# Patient Record
Sex: FEMALE | Race: WHITE | NOT HISPANIC OR LATINO | Employment: FULL TIME | URBAN - METROPOLITAN AREA
[De-identification: names, ages, dates, MRNs, and addresses within clinical notes are randomized per-mention and may not be internally consistent; named-entity substitution may affect disease eponyms.]

---

## 2017-02-17 ENCOUNTER — TRANSCRIBE ORDERS (OUTPATIENT)
Dept: ADMINISTRATIVE | Facility: HOSPITAL | Age: 42
End: 2017-02-17

## 2017-02-17 ENCOUNTER — APPOINTMENT (OUTPATIENT)
Dept: LAB | Facility: HOSPITAL | Age: 42
End: 2017-02-17
Attending: FAMILY MEDICINE
Payer: COMMERCIAL

## 2017-02-17 DIAGNOSIS — E11.49 TYPE II DIABETES MELLITUS WITH NEUROLOGICAL MANIFESTATIONS (HCC): ICD-10-CM

## 2017-02-17 DIAGNOSIS — Z78.9 NORMAL URINALYSIS: Primary | ICD-10-CM

## 2017-02-17 LAB
LCA URINE COLLECTION: NORMAL
VENIPUNCTURE: NORMAL

## 2017-02-17 PROCEDURE — 36415 COLL VENOUS BLD VENIPUNCTURE: CPT | Performed by: FAMILY MEDICINE

## 2017-02-20 ENCOUNTER — LAB CONVERSION - ENCOUNTER (OUTPATIENT)
Dept: OTHER | Facility: OTHER | Age: 42
End: 2017-02-20

## 2017-02-20 ENCOUNTER — GENERIC CONVERSION - ENCOUNTER (OUTPATIENT)
Dept: OTHER | Facility: OTHER | Age: 42
End: 2017-02-20

## 2017-02-20 LAB
CREATININE, RANDOM URINE (HISTORICAL): 224 MG/DL (ref 20–320)
HBA1C MFR BLD HPLC: 7.6 % OF TOTAL HGB
MAGNESIUM, UR (HISTORICAL): 1 MG/DL
MICROALBUMIN/CREATININE RATIO (HISTORICAL): 4 MCG/MG CREAT

## 2017-02-24 ENCOUNTER — ALLSCRIPTS OFFICE VISIT (OUTPATIENT)
Dept: OTHER | Facility: OTHER | Age: 42
End: 2017-02-24

## 2017-03-03 ENCOUNTER — ALLSCRIPTS OFFICE VISIT (OUTPATIENT)
Dept: OTHER | Facility: OTHER | Age: 42
End: 2017-03-03

## 2017-03-28 ENCOUNTER — ALLSCRIPTS OFFICE VISIT (OUTPATIENT)
Dept: OTHER | Facility: OTHER | Age: 42
End: 2017-03-28

## 2017-06-05 ENCOUNTER — ALLSCRIPTS OFFICE VISIT (OUTPATIENT)
Dept: OTHER | Facility: OTHER | Age: 42
End: 2017-06-05

## 2017-06-05 DIAGNOSIS — R53.83 OTHER FATIGUE: ICD-10-CM

## 2017-06-05 DIAGNOSIS — F41.9 ANXIETY DISORDER: ICD-10-CM

## 2017-06-05 DIAGNOSIS — R07.9 CHEST PAIN: ICD-10-CM

## 2017-06-05 DIAGNOSIS — E11.42 TYPE 2 DIABETES MELLITUS WITH DIABETIC POLYNEUROPATHY (HCC): ICD-10-CM

## 2017-06-07 ENCOUNTER — TRANSCRIBE ORDERS (OUTPATIENT)
Dept: ADMINISTRATIVE | Facility: HOSPITAL | Age: 42
End: 2017-06-07

## 2017-06-07 ENCOUNTER — APPOINTMENT (OUTPATIENT)
Dept: LAB | Facility: HOSPITAL | Age: 42
End: 2017-06-07
Attending: FAMILY MEDICINE
Payer: COMMERCIAL

## 2017-06-07 ENCOUNTER — ALLSCRIPTS OFFICE VISIT (OUTPATIENT)
Dept: OTHER | Facility: OTHER | Age: 42
End: 2017-06-07

## 2017-06-07 DIAGNOSIS — F41.9 ANXIETY: Primary | ICD-10-CM

## 2017-06-07 LAB — VENIPUNCTURE: NORMAL

## 2017-06-07 PROCEDURE — 36415 COLL VENOUS BLD VENIPUNCTURE: CPT | Performed by: FAMILY MEDICINE

## 2017-06-13 ENCOUNTER — GENERIC CONVERSION - ENCOUNTER (OUTPATIENT)
Dept: OTHER | Facility: OTHER | Age: 42
End: 2017-06-13

## 2017-06-13 ENCOUNTER — LAB CONVERSION - ENCOUNTER (OUTPATIENT)
Dept: OTHER | Facility: OTHER | Age: 42
End: 2017-06-13

## 2017-06-13 LAB
A/G RATIO (HISTORICAL): 1.4 (CALC) (ref 1–2.5)
ALBUMIN SERPL BCP-MCNC: 4.2 G/DL (ref 3.6–5.1)
ALP SERPL-CCNC: 52 U/L (ref 33–115)
ALT SERPL W P-5'-P-CCNC: 7 U/L (ref 6–29)
AST SERPL W P-5'-P-CCNC: 12 U/L (ref 10–30)
BASOPHILS # BLD AUTO: 0.4 %
BASOPHILS # BLD AUTO: 42 CELLS/UL (ref 0–200)
BILIRUB SERPL-MCNC: 0.6 MG/DL (ref 0.2–1.2)
BUN SERPL-MCNC: 16 MG/DL (ref 7–25)
BUN/CREA RATIO (HISTORICAL): ABNORMAL (CALC) (ref 6–22)
CALCIUM SERPL-MCNC: 9.6 MG/DL (ref 8.6–10.2)
CHLORIDE SERPL-SCNC: 101 MMOL/L (ref 98–110)
CO2 SERPL-SCNC: 27 MMOL/L (ref 20–31)
CREAT SERPL-MCNC: 0.75 MG/DL (ref 0.5–1.1)
DEPRECATED RDW RBC AUTO: 15.8 % (ref 11–15)
EBV INTERPRETATION (HISTORICAL): ABNORMAL
EGFR AFRICAN AMERICAN (HISTORICAL): 115 ML/MIN/1.73M2
EGFR-AMERICAN CALC (HISTORICAL): 99 ML/MIN/1.73M2
EOSINOPHIL # BLD AUTO: 1.7 %
EOSINOPHIL # BLD AUTO: 177 CELLS/UL (ref 15–500)
EPSTEIN-BARR NUCLEAR ANTIGEN AB IGG (HISTORICAL): 157 U/ML
EPSTEIN-BARR VCA IGG (HISTORICAL): 591 U/ML
EPSTEIN-BARR VCA IGM (HISTORICAL): <36 U/ML
GAMMA GLOBULIN (HISTORICAL): 3.1 G/DL (CALC) (ref 1.9–3.7)
GLUCOSE (HISTORICAL): 142 MG/DL (ref 65–99)
HBA1C MFR BLD HPLC: 6.5 % OF TOTAL HGB
HCT VFR BLD AUTO: 45.3 % (ref 35–45)
HGB BLD-MCNC: 14.9 G/DL (ref 11.7–15.5)
LYME IGG/IGM AB (HISTORICAL): <0.9 INDEX
LYMPHOCYTES # BLD AUTO: 13.1 %
LYMPHOCYTES # BLD AUTO: 1362 CELLS/UL (ref 850–3900)
MCH RBC QN AUTO: 29.4 PG (ref 27–33)
MCHC RBC AUTO-ENTMCNC: 32.8 G/DL (ref 32–36)
MCV RBC AUTO: 89.4 FL (ref 80–100)
MONOCYTES # BLD AUTO: 530 CELLS/UL (ref 200–950)
MONOCYTES (HISTORICAL): 5.1 %
NEUTROPHILS # BLD AUTO: 79.7 %
NEUTROPHILS # BLD AUTO: 8289 CELLS/UL (ref 1500–7800)
PLATELET # BLD AUTO: 260 THOUSAND/UL (ref 140–400)
PMV BLD AUTO: 10.3 FL (ref 7.5–12.5)
POTASSIUM SERPL-SCNC: 5 MMOL/L (ref 3.5–5.3)
RBC # BLD AUTO: 5.06 MILLION/UL (ref 3.8–5.1)
SODIUM SERPL-SCNC: 136 MMOL/L (ref 135–146)
TOTAL PROTEIN (HISTORICAL): 7.3 G/DL (ref 6.1–8.1)
TSH SERPL DL<=0.05 MIU/L-ACNC: 0.79 MIU/L
WBC # BLD AUTO: 10.4 THOUSAND/UL (ref 3.8–10.8)

## 2017-07-05 ENCOUNTER — GENERIC CONVERSION - ENCOUNTER (OUTPATIENT)
Dept: PODIATRY | Facility: CLINIC | Age: 42
End: 2017-07-05

## 2017-08-24 ENCOUNTER — ALLSCRIPTS OFFICE VISIT (OUTPATIENT)
Dept: OTHER | Facility: OTHER | Age: 42
End: 2017-08-24

## 2017-08-24 DIAGNOSIS — N63.0 BREAST LUMP: ICD-10-CM

## 2017-08-24 DIAGNOSIS — Z12.31 ENCOUNTER FOR SCREENING MAMMOGRAM FOR MALIGNANT NEOPLASM OF BREAST: ICD-10-CM

## 2017-08-28 ENCOUNTER — GENERIC CONVERSION - ENCOUNTER (OUTPATIENT)
Dept: OTHER | Facility: OTHER | Age: 42
End: 2017-08-28

## 2017-10-09 ENCOUNTER — ALLSCRIPTS OFFICE VISIT (OUTPATIENT)
Dept: OTHER | Facility: OTHER | Age: 42
End: 2017-10-09

## 2017-11-02 ENCOUNTER — ALLSCRIPTS OFFICE VISIT (OUTPATIENT)
Dept: OTHER | Facility: OTHER | Age: 42
End: 2017-11-02

## 2018-01-03 ENCOUNTER — ALLSCRIPTS OFFICE VISIT (OUTPATIENT)
Dept: OTHER | Facility: OTHER | Age: 43
End: 2018-01-03

## 2018-01-03 LAB — HBA1C MFR BLD HPLC: 8.5 %

## 2018-01-04 NOTE — PROGRESS NOTES
Assessment   1  Diabetes mellitus with polyneuropathy (250 60,357 2) (E11 42)   2  Current every day smoker (305 1) (F17 200)   3  BMI 28 0-28 9,adult (V85 24) (Z68 28)    Plan   Diabetes mellitus with neurological manifestations, uncontrolled    · Invokana 300 MG Oral Tablet; take 1 tablet by mouth once daily   · MetFORMIN HCl - 1000 MG Oral Tablet; take 1 tablet by mouth twice a day   · OneTouch Ultra Blue In Vitro Strip; TEST TWICE A DAY   · Ramipril 2 5 MG Oral Capsule; Take one capsule daily   · Victoza 18 MG/3ML Subcutaneous Solution Pen-injector; inject 1 8 milligram    subcutaneously daily  Diabetes mellitus with polyneuropathy    · Lantus 100 UNIT/ML Subcutaneous Solution; INJECT 10 UNIT Daily   · (1) CBC/PLT/DIFF; Status:Active; Requested SXY:67XMZ4906;    · (1) COMPREHENSIVE METABOLIC PANEL; Status:Active; Requested OWW:85YOU5757;    · (1) LIPID PANEL FASTING W DIRECT LDL REFLEX; Status:Active; Requested    VEP:83HLA3527;    · Hemoglobin A1c- POC; Status:Complete;   Done: 80UQE1371 09:06AM  SocHx: Current every day smoker    · Decreasing the stress in your life may help your condition improve ; Status:Complete;      Done: 51NTN4679   · You need to quit smoking ; Status:Complete;   Done: 95TYT8348   · You need to stop smoking  Though it is not easy, more than half of all adult smokers    have quit  We encourage you to write down all the reasons you should quit smoking and    set a quit date for yourself  Ask us how we can help  You may also call    800QUIT-NOW for free resources and assistance ; Status:Complete;   Done:    18MMW7324    Discussion/Summary      DM- A1c today 8 5 up from 6 5 in June 2017  She will continue her current medications and will start on Lantus 10 units daily and increase to achieve acceptable fasting blood glucose levels  Labs ordered  to quit smoking and increase aerobic exercise    importance of compliance with Ramipril for renal protection will have her eye exam done up in 3 months or sooner as needed  Possible side effects of new medications were reviewed with the patient/guardian today  The treatment plan was reviewed with the patient/guardian  The patient/guardian understands and agrees with the treatment plan      Chief Complaint   Pt c/o high BS readings for the past days  er/cma  History of Present Illness   HPI: Here today with complaints of elevated blood glucose levels on home monitor  Her fasting blood sugars have been 200-300 for the past 3-4 weeks  They have been slowly rising  She watches her dietary carbohydrate intake and total calories  She is unsure why her sugars have been going up  She is exercising a few times per week  major weight changes and has been compliant with her medications  for labs  for eye exam       Review of Systems        Constitutional: No fever, no chills, feels well, no tiredness, no recent weight gain or loss  Cardiovascular: no chest pain  Respiratory: no shortness of breath-- and-- no cough  Gastrointestinal: no abdominal pain,-- no nausea,-- no vomiting-- and-- no diarrhea  Active Problems   1  Adverse reaction to statin medication (E942 2) (T46 6X5A)   2  Allergic asthma, mild intermittent, uncomplicated (596 97) (Z68 23)   3  Anxiety (300 00) (F41 9)   4  Chest pain (786 50) (R07 9)   5  Diabetes mellitus with neurological manifestations, uncontrolled (250 62)     (E11 49,E11 65)   6  Diabetes mellitus with polyneuropathy (250 60,357 2) (E11 42)   7  Dysesthesia (782 0) (R20 8)   8  Encounter for screening for cardiovascular disorders (V81 2) (Z13 6)   9  Encounter for screening mammogram for malignant neoplasm of breast (V76 12)     (Z12 31)   10  Fatigue (780 79) (R53 83)   11  Left breast mass (611 72) (N63 20)   12  Lumbago (724 2) (M54 5)   13  Need for immunization against influenza (V04 81) (Z23)   14  Nicotine dependence (305 1) (F17 200)   15  Obesity (278 00) (E66 9)   16   Onychomycosis (110 1) (B35 1)   17  Paresthesia of both feet (782 0) (R20 2)   18  Plantar fasciitis (728 71) (M72 2)   19  Screening for hypothyroidism (V77 0) (Z13 29)    Past Medical History   1  History of Abdominal cramping (789 00) (R10 9)   2  Acute bronchitis (466 0) (J20 9)   3  History of Acute bronchitis, unspecified organism (466 0) (J20 9)   4  History of Acute upper respiratory infection (465 9) (J06 9)   5  History of Acute vaginitis (616 10) (N76 0)   6  History of Diabetes Mellitus (250 00)   7  History of acute gastritis (V12 70) (Z87 19)   8  History of breast pain   9  History of fatigue (V13 89) (Z87 898)   10  History of ingrown nail (V13 3) (Z87 2)   11  History of Influenza-like illness (799 89) (R69)   12  History of Nausea (787 02) (R11 0)   13  History of Overweight (BMI 25 0-29 9) (278 02) (E66 3)   14  History of Pain in both feet (729 5) (M79 671,M79 672)   15  History of Streptococcal sore throat (034 0) (J02 0)   16  Viral gastroenteritis (008 8) (A08 4)  Active Problems And Past Medical History Reviewed: The active problems and past medical history were reviewed and updated today  Family History   Mother    1  Family history of hypertension (V17 49) (Z82 49)  Family History    2  Family history of Breast Cancer (V16 3)   3  Family history of Colon Cancer (V16 0)   4  Family history of Diabetes Mellitus (V18 0)   5  Family history of Lung Cancer (V16 1)  Family History Reviewed: The family history was reviewed and updated today  Social History    · Current every day smoker (305 1) (F17 200)  The social history was reviewed and is unchanged  Surgical History   1  History of Biopsy Skin   2  History of Hand Surgery    Current Meds    1  Accu-Chek Softclix Lancets Miscellaneous; 1 Two Times A Day 250; Therapy: 23GDH2177 to (Last Rx:28Mar2014)  Requested for: 84Oij4812 Ordered   2  ALPRAZolam 0 25 MG Oral Tablet; 1 tab as needed daily PRN anxiety;      Therapy: 80VNF9932 to (Last Rx:05Jun2017) Ordered   3  Daily Multivitamin TABS; one every day, generic is acceptable; Therapy: (WMUIBOLA:51OBC7985) to Recorded   4  Gabapentin 800 MG Oral Tablet; TAKE 1 TABLET 4 TIMES DAILY; Therapy: 89Vmw8008 to (Evaluate:84Vgy1557)  Requested for: 19Mti4008; Last     Rx:86Lbj3323 Ordered   5  Invokana 300 MG Oral Tablet; take 1 tablet by mouth once daily; Therapy: 59FSM7544 to (Evaluate:24Mar2018)  Requested for: 01Lmo3847; Last     Rx:69Yir0725 Ordered   6  MetFORMIN HCl - 1000 MG Oral Tablet; take 1 tablet by mouth twice a day; Therapy: 37Ppx6836 to (Evaluate:18Xxb3143)  Requested for: 87Bbg9317; Last     Rx:18Qpq2032 Ordered   7  NovoFine 32G X 6 MM Miscellaneous; USE ONE TIME A DAY; Therapy: 17FQT8229 to (467 20 767)  Requested for: 03SIN3733; Last     Rx:03Mar2017 Ordered   8  Ondansetron HCl - 8 MG Oral Tablet; TAKE 1 TABLET Every 8 hours; Therapy: 71Myi5419 to (Evaluate:31Mar2017)  Requested for: 18GHW5179; Last     Rx:24Mar2017 Ordered   9  OneTouch Ultra Blue In [employer]; TEST TWICE A DAY; Therapy: 98ERL7117 to (463 20 767)  Requested for: 18TTU0015; Last     FE:12ZKZ9235 Ordered   10  Ramipril 2 5 MG Oral Capsule; Take one capsule daily; Therapy: 89MZU7549 to (Chava Da Silva)  Requested for: 88HLK3636; Last      Rx:03Mar2017 Ordered   11  Victoza 18 MG/3ML Subcutaneous Solution Pen-injector; inject 1 8 milligram      subcutaneously daily; Therapy: 62SZV7242 to (Evaluate:06Gsr7696)  Requested for: 26AQO9909; Last      Rx:28Mar2017 Ordered     The medication list was reviewed and updated today  Allergies   1   No Known Drug Allergies    Vitals    Recorded: 16RWL3892 08:41AM   Temperature 96 8 F   Heart Rate 80   Respiration 20   Systolic 574   Diastolic 80   Height 5 ft 2 in   Weight 157 lb    BMI Calculated 28 72   BSA Calculated 1 72     Physical Exam        Constitutional      General appearance: No acute distress, well appearing and well nourished  Eyes      Conjunctiva and lids: No swelling, erythema or discharge  Ears, Nose, Mouth, and Throat      Otoscopic examination: Tympanic membranes translucent with normal light reflex  Canals patent without erythema  Nasal mucosa, septum, and turbinates: Normal without edema or erythema  Oropharynx: Normal with no erythema, edema, exudate or lesions  Pulmonary      Respiratory effort: No increased work of breathing or signs of respiratory distress  Auscultation of lungs: Clear to auscultation  Cardiovascular      Auscultation of heart: Normal rate and rhythm, normal S1 and S2, without murmurs  Examination of extremities for edema and/or varicosities: Normal        Lymphatic      Palpation of lymph nodes in neck: No lymphadenopathy  Skin      Skin and subcutaneous tissue: Normal without rashes or lesions  Psychiatric      Mood and affect: Normal           Results/Data   Hemoglobin A1c- POC 77MXW5584 09:06AM Micaela Ivey      Test Name Result Flag Reference   HEMOGLOBIN A1C 8 5        PHQ-2 Adult Depression Screening 16IJX6321 08:43AM Ha Ricketts      Test Name Result Flag Reference   PHQ-2 Adult Depression Score 0     Over the last two weeks, how often have you been bothered by any of the following problems? Little interest or pleasure in doing things: Not at all - 0     Feeling down, depressed, or hopeless: Not at all - 0   PHQ-2 Adult Depression Screening Negative          Attending Note   Collaborating Physician Note: Collaborating Note: I agree with the Advanced Practitioner note        Signatures    Electronically signed by : Giuliana Barksdale; Andres  3 2018  7:44PM EST                       (Author)     Electronically signed by : Ashkan Vides DO; Andres  3 2018  8:06PM EST                       (Review)

## 2018-01-11 NOTE — MISCELLANEOUS
Message  Return to work or school:   Peter Mccann is under my professional care  She was seen in my office on 11/2/17       Please excuse from work 10/31/17-11/3/17  ASA Cantor        Signatures   Electronically signed by : Virginia Obrien; Nov 2 2017  3:53PM EST                       (Author)    Electronically signed by : KYE Mcgee ; Nov 2 2017  4:23PM EST                       (Review)

## 2018-01-12 VITALS — HEIGHT: 62 IN | BODY MASS INDEX: 26.68 KG/M2 | WEIGHT: 145 LBS

## 2018-01-12 VITALS
TEMPERATURE: 97.6 F | HEIGHT: 62 IN | SYSTOLIC BLOOD PRESSURE: 130 MMHG | HEART RATE: 72 BPM | DIASTOLIC BLOOD PRESSURE: 88 MMHG | BODY MASS INDEX: 26.68 KG/M2 | RESPIRATION RATE: 18 BRPM | WEIGHT: 145 LBS

## 2018-01-12 NOTE — MISCELLANEOUS
Message  Return to work or school:   Samira Salinas is under my professional care  She was seen in my office on 12/29/16       Please excuse Newby Malady from work 12/29/16 and 12/30/16  ASA Pandya        Signatures   Electronically signed by : Amanda Walton; Dec 30 2016 11:58AM EST                       (Author)

## 2018-01-13 VITALS
RESPIRATION RATE: 18 BRPM | WEIGHT: 156 LBS | DIASTOLIC BLOOD PRESSURE: 70 MMHG | HEIGHT: 62 IN | HEART RATE: 80 BPM | TEMPERATURE: 97.2 F | SYSTOLIC BLOOD PRESSURE: 120 MMHG | BODY MASS INDEX: 28.71 KG/M2

## 2018-01-13 NOTE — RESULT NOTES
Verified Results  (1) COMPREHENSIVE METABOLIC PANEL 91KKB2678 70:40OD Raquel Fend     Test Name Result Flag Reference   GLUCOSE 202 mg/dL H 65-99   Fasting reference interval   UREA NITROGEN (BUN) 12 mg/dL  7-25   CREATININE 0 89 mg/dL  0 50-1 10   eGFR NON-AFR  AMERICAN 81 mL/min/1 73m2  > OR = 60   eGFR AFRICAN AMERICAN 93 mL/min/1 73m2  > OR = 60   BUN/CREATININE RATIO   0-49   NOT APPLICABLE (calc)   SODIUM 138 mmol/L  135-146   POTASSIUM 4 4 mmol/L  3 5-5 3   CHLORIDE 101 mmol/L     CARBON DIOXIDE 24 mmol/L  20-31   CALCIUM 9 4 mg/dL  8 6-10 2   PROTEIN, TOTAL 7 2 g/dL  6 1-8 1   ALBUMIN 4 5 g/dL  3 6-5 1   GLOBULIN 2 7 g/dL (calc)  1 9-3 7   ALBUMIN/GLOBULIN RATIO 1 7 (calc)  1 0-2 5   BILIRUBIN, TOTAL 0 4 mg/dL  0 2-1 2   ALKALINE PHOSPHATASE 60 U/L     AST 13 U/L  10-30   ALT 12 U/L  6-29     (1) LIPID PANEL, FASTING 05VVB1461 08:30AM Raquel Fend     Test Name Result Flag Reference   CHOLESTEROL, TOTAL 163 mg/dL  125-200   HDL CHOLESTEROL 56 mg/dL  > OR = 46   TRIGLICERIDES 72 mg/dL  <385   LDL-CHOLESTEROL 93 mg/dL (calc)  <130   Desirable range <100 mg/dL for patients with CHD or  diabetes and <70 mg/dL for diabetic patients with  known heart disease  CHOL/HDLC RATIO 2 9 (calc)  < OR = 5 0   NON HDL CHOLESTEROL 107 mg/dL (calc)     Target for non-HDL cholesterol is 30 mg/dL higher than   LDL cholesterol target       (Q) MICROALBUMIN, RANDOM URINE (W/CREATININE) 10ADZ8286 08:30AM Raquel Fend     Test Name Result Flag Reference   CREATININE, RANDOM URINE 224 mg/dL     MICROALBUMIN 1 0 mg/dL     Reference Range  Not established   MICROALBUMIN/CREATININE$RATIO, RANDOM URINE 4 mcg/mg creat  <30   The ADA defines abnormalities in albumin  excretion as follows:     Category         Result (mcg/mg creatinine)     Normal                    <30  Microalbuminuria            Clinical albuminuria   > OR = 300     The ADA recommends that at least two of three  specimens collected within a 3-6 month period be  abnormal before considering a patient to be  within a diagnostic category  (Q) HEMOGLOBIN A1c 39HOL4153 08:30AM Keri Shantonio     Test Name Result Flag Reference   HEMOGLOBIN A1c 7 6 % of total Hgb H <5 7   According to ADA guidelines, hemoglobin A1c <7 0%  represents optimal control in non-pregnant diabetic  patients  Different metrics may apply to specific  patient populations  Standards of Medical Care in    Diabetes Care  2013;36:s11-s66     For the purpose of screening for the presence of  diabetes  <5 7%       Consistent with the absence of diabetes  5 7-6 4%    Consistent with increased risk for diabetes              (prediabetes)  >or=6 5%    Consistent with diabetes     This assay result is consistent with diabetes  mellitus  Currently, no consensus exists for use of hemoglobin  A1c for diagnosis of diabetes for children         Discussion/Summary   will discuss labs at follow up appt

## 2018-01-13 NOTE — RESULT NOTES
Discussion/Summary   will discuss labs at three weeks follow up     Verified Results  (1) CBC/PLT/DIFF [id number] 08:30AM Enzo BigRoad     Test Name Result Flag Reference   WHITE BLOOD CELL COUNT 10 4 Thousand/uL  3 8-10 8   RED BLOOD CELL COUNT 5 06 Million/uL  3 80-5 10   HEMOGLOBIN 14 9 g/dL  11 7-15 5   HEMATOCRIT 45 3 % H 35 0-45 0   MCV 89 4 fL  80 0-100 0   MCH 29 4 pg  27 0-33 0   MCHC 32 8 g/dL  32 0-36 0   RDW 15 8 % H 11 0-15 0   PLATELET COUNT 960 Thousand/uL  140-400   ABSOLUTE NEUTROPHILS 8289 cells/uL H 7812-1905   ABSOLUTE LYMPHOCYTES 1362 cells/uL  850-3900   ABSOLUTE MONOCYTES 530 cells/uL  200-950   ABSOLUTE EOSINOPHILS 177 cells/uL     ABSOLUTE BASOPHILS 42 cells/uL  0-200   NEUTROPHILS 79 7 %     LYMPHOCYTES 13 1 %     MONOCYTES 5 1 %     EOSINOPHILS 1 7 %     BASOPHILS 0 4 %     MPV 10 3 fL  7 5-12 5     (1) COMPREHENSIVE METABOLIC PANEL [id number] 39:41MV Enzo BigRoad     Test Name Result Flag Reference   GLUCOSE 142 mg/dL H 65-99   Fasting reference interval     For someone without known diabetes, a glucose  value >125 mg/dL indicates that they may have  diabetes and this should be confirmed with a  follow-up test    UREA NITROGEN (BUN) 16 mg/dL  7-25   CREATININE 0 75 mg/dL  0 50-1 10   eGFR NON-AFR   AMERICAN 99 mL/min/1 73m2  > OR = 60   eGFR AFRICAN AMERICAN 115 mL/min/1 73m2  > OR = 60   BUN/CREATININE RATIO   0-11   NOT APPLICABLE (calc)   SODIUM 136 mmol/L  135-146   POTASSIUM 5 0 mmol/L  3 5-5 3   CHLORIDE 101 mmol/L     CARBON DIOXIDE 27 mmol/L  20-31   CALCIUM 9 6 mg/dL  8 6-10 2   PROTEIN, TOTAL 7 3 g/dL  6 1-8 1   ALBUMIN 4 2 g/dL  3 6-5 1   GLOBULIN 3 1 g/dL (calc)  1 9-3 7   ALBUMIN/GLOBULIN RATIO 1 4 (calc)  1 0-2 5   BILIRUBIN, TOTAL 0 6 mg/dL  0 2-1 2   ALKALINE PHOSPHATASE 52 U/L     AST 12 U/L  10-30   ALT 7 U/L  6-29     (Q) LYME DISEASE AB, TOTAL W/REFL WB (IGG, IGM) [id number] 08:30AM Enzo Tello     Test Name Result Flag Reference   LYME AB SCREEN <0 90 index     Index                Interpretation                     -----                --------------                     < 0 90               Negative                     0  90-1 09            Equivocal                     > 1 09               Positive      As recommended by the Food and Drug Administration   (FDA), all samples with positive or equivocal   results in a Borrelia burgdorferi antibody screen  will be tested using a blot method  Positive or   equivocal screening test results should not be   interpreted as truly positive until verified as such   using a supplemental assay (e g , B  burgdorferi blot)  The screening test and/or blot for B  burgdorferi   antibodies may be falsely negative in early stages  of Lyme disease, including the period when erythema   migrans is apparent  (Q) DEWAYNE BARR VIRUS ANTIBODY PANEL [id number] 08:30AM Marcello Musca     Test Name Result Flag Reference   DEWAYNE SARAVIA VIRUS VCA$ANTIBODY (IGM) <36 00 U/mL     U/mL              Interpretation        ----              --------------        <36 00            Negative        36 00-43 99       Equivocal        >43 99            Positive   DEWAYNE SARAVIA VIRUS VCA$ANTIBODY (IGG) 591 00 U/mL H    U/mL             Interpretation         ----             --------------         <18 00           Negative         18 00-21 99      Equivocal         >21 99           Positive   DEWAYNE BARR VIRUS (EBNA)$ANTIBODY (IGG) 157 00 U/mL H    U/mL             Interpretation         ----             --------------         <18 00           Negative         18 00-21 99      Equivocal         >21 99           Positive   INTERPRETATION:      Suggestive of a past Dewayne-Barr virus infection  In infants, a similar pattern may occur as a result  of passive maternal transfer of antibody       (Q) TSH, 3RD GENERATION [id number] 08:30AM Marcello Musca     Test Name Result Flag Reference   TSH 0 79 mIU/L     Reference Range > or = 20 Years  0 40-4 50                              Pregnancy Ranges            First trimester    0 26-2 66            Second trimester   0 55-2 73            Third trimester    0 43-2 91     (Q) HEMOGLOBIN A1c [id number] 08:30AM Cal Ferrer     Test Name Result Flag Reference   HEMOGLOBIN A1c 6 5 % of total Hgb H <5 7   For someone without known diabetes, a hemoglobin A1c  value of 6 5% or greater indicates that they may have   diabetes and this should be confirmed with a follow-up   test      For someone with known diabetes, a value <7% indicates   that their diabetes is well controlled and a value   greater than or equal to 7% indicates suboptimal   control  A1c targets should be individualized based on   duration of diabetes, age, comorbid conditions, and   other considerations  Currently, no consensus exists regarding use of  hemoglobin A1c for diagnosis of diabetes for children

## 2018-01-14 VITALS
DIASTOLIC BLOOD PRESSURE: 80 MMHG | RESPIRATION RATE: 16 BRPM | SYSTOLIC BLOOD PRESSURE: 130 MMHG | BODY MASS INDEX: 29.81 KG/M2 | HEART RATE: 72 BPM | WEIGHT: 162 LBS | HEIGHT: 62 IN

## 2018-01-14 VITALS
HEART RATE: 76 BPM | RESPIRATION RATE: 18 BRPM | BODY MASS INDEX: 28.89 KG/M2 | DIASTOLIC BLOOD PRESSURE: 72 MMHG | TEMPERATURE: 97.6 F | WEIGHT: 157 LBS | SYSTOLIC BLOOD PRESSURE: 124 MMHG | HEIGHT: 62 IN

## 2018-01-14 VITALS
RESPIRATION RATE: 16 BRPM | SYSTOLIC BLOOD PRESSURE: 128 MMHG | WEIGHT: 159 LBS | HEART RATE: 76 BPM | TEMPERATURE: 98.4 F | DIASTOLIC BLOOD PRESSURE: 70 MMHG | BODY MASS INDEX: 29.26 KG/M2 | HEIGHT: 62 IN

## 2018-01-14 VITALS
WEIGHT: 153 LBS | HEIGHT: 62 IN | RESPIRATION RATE: 14 BRPM | TEMPERATURE: 97.9 F | BODY MASS INDEX: 28.16 KG/M2 | SYSTOLIC BLOOD PRESSURE: 130 MMHG | HEART RATE: 78 BPM | DIASTOLIC BLOOD PRESSURE: 80 MMHG

## 2018-01-14 NOTE — RESULT NOTES
Verified Results  (1) CBC/PLT/DIFF [id number] 09:30AM [facility]     Test Name Result Flag Reference   WHITE BLOOD CELL COUNT 10 6 Thousand/uL  3 8-10 8   RED BLOOD CELL COUNT 4 59 Million/uL  3 80-5 10   HEMOGLOBIN 13 7 g/dL  11 7-15 5   HEMATOCRIT 42 4 %  35 0-45 0   MCV 92 3 fL  80 0-100 0   MCH 29 8 pg  27 0-33 0   MCHC 32 2 g/dL  32 0-36 0   RDW 14 0 %  11 0-15 0   PLATELET COUNT 621 Thousand/uL  140-400   MPV 10 6 fL  7 5-11 5   ABSOLUTE NEUTROPHILS 8098 cells/uL H 5191-5662   ABSOLUTE LYMPHOCYTES 1685 cells/uL  850-3900   ABSOLUTE MONOCYTES 541 cells/uL  200-950   ABSOLUTE EOSINOPHILS 233 cells/uL     ABSOLUTE BASOPHILS 42 cells/uL  0-200   NEUTROPHILS 76 4 %     LYMPHOCYTES 15 9 %     MONOCYTES 5 1 %     EOSINOPHILS 2 2 %     BASOPHILS 0 4 %       (1) COMPREHENSIVE METABOLIC PANEL [id number] 41:12OI [facility]     Test Name Result Flag Reference   GLUCOSE 261 mg/dL H 65-99   Fasting reference interval   UREA NITROGEN (BUN) 13 mg/dL  7-25   CREATININE 0 87 mg/dL  0 50-1 10   eGFR NON-AFR   AMERICAN 83 mL/min/1 73m2  > OR = 60   eGFR AFRICAN AMERICAN 97 mL/min/1 73m2  > OR = 60   BUN/CREATININE RATIO   7-85   NOT APPLICABLE (calc)   SODIUM 137 mmol/L  135-146   POTASSIUM 4 4 mmol/L  3 5-5 3   CHLORIDE 102 mmol/L     CARBON DIOXIDE 25 mmol/L  20-31   CALCIUM 9 2 mg/dL  8 6-10 2   PROTEIN, TOTAL 6 8 g/dL  6 1-8 1   ALBUMIN 3 9 g/dL  3 6-5 1   GLOBULIN 2 9 g/dL (calc)  1 9-3 7   ALBUMIN/GLOBULIN RATIO 1 3 (calc)  1 0-2 5   BILIRUBIN, TOTAL 0 3 mg/dL  0 2-1 2   ALKALINE PHOSPHATASE 55 U/L     AST 12 U/L  10-30   ALT 11 U/L  6-29     (Q) MICROALBUMIN, RANDOM URINE (W/CREATININE) [id number] 09:30AM [facility]     Test Name Result Flag Reference   CREATININE, RANDOM URINE 134 mg/dL     MICROALBUMIN 0 9 mg/dL     Reference Range  Not established   MICROALBUMIN/CREATININE$RATIO, RANDOM URINE 7 mcg/mg creat  <30   The ADA defines abnormalities in albumin  excretion as follows: Category         Result (mcg/mg creatinine)     Normal                    <30  Microalbuminuria            Clinical albuminuria   > OR = 300     The ADA recommends that at least two of three  specimens collected within a 3-6 month period be  abnormal before considering a patient to be  within a diagnostic category  (Q) HEMOGLOBIN A1c [id number] 09:30AM Cindy Nesbitt     Test Name Result Flag Reference   HEMOGLOBIN A1c 8 3 % of total Hgb H <5 7   According to ADA guidelines, hemoglobin A1c <7 0%  represents optimal control in non-pregnant diabetic  patients  Different metrics may apply to specific  patient populations  Standards of Medical Care in    Diabetes Care  2013;36:s11-s66     For the purpose of screening for the presence of  diabetes  <5 7%       Consistent with the absence of diabetes  5 7-6 4%    Consistent with increased risk for diabetes              (prediabetes)  >or=6 5%    Consistent with diabetes     This assay result is consistent with diabetes  mellitus  Currently, no consensus exists for use of hemoglobin  A1c for diagnosis of diabetes for children         Discussion/Summary   please make appt to discuss labs

## 2018-01-17 NOTE — MISCELLANEOUS
Provider Comments  Provider Comments:   Called patient left message on answering machine about her missed appointment        Signatures   Electronically signed by : KYE Lai ; Oct  9 2017  3:51PM EST                       (Author)

## 2018-01-22 VITALS
DIASTOLIC BLOOD PRESSURE: 80 MMHG | TEMPERATURE: 96.8 F | BODY MASS INDEX: 28.89 KG/M2 | RESPIRATION RATE: 20 BRPM | WEIGHT: 157 LBS | HEIGHT: 62 IN | HEART RATE: 80 BPM | SYSTOLIC BLOOD PRESSURE: 114 MMHG

## 2018-01-27 LAB
ALBUMIN SERPL-MCNC: 4 G/DL (ref 3.5–5.5)
ALBUMIN/GLOB SERPL: 1.5 {RATIO} (ref 1.2–2.2)
ALP SERPL-CCNC: 48 IU/L (ref 39–117)
ALT SERPL-CCNC: 7 IU/L (ref 0–32)
AMBIG ABBREV DEFAULT: NORMAL
AST SERPL-CCNC: 13 IU/L (ref 0–40)
BASOPHILS # BLD AUTO: 0.1 X10E3/UL (ref 0–0.2)
BASOPHILS NFR BLD AUTO: 1 %
BILIRUB SERPL-MCNC: 0.3 MG/DL (ref 0–1.2)
BUN SERPL-MCNC: 14 MG/DL (ref 6–24)
BUN/CREAT SERPL: 17 (ref 9–23)
CALCIUM SERPL-MCNC: 8.9 MG/DL (ref 8.7–10.2)
CHLORIDE SERPL-SCNC: 100 MMOL/L (ref 96–106)
CHOLEST SERPL-MCNC: 140 MG/DL (ref 100–199)
CO2 SERPL-SCNC: 23 MMOL/L (ref 18–29)
CREAT SERPL-MCNC: 0.83 MG/DL (ref 0.57–1)
EOSINOPHIL # BLD AUTO: 0.3 X10E3/UL (ref 0–0.4)
EOSINOPHIL NFR BLD AUTO: 2 %
ERYTHROCYTE [DISTWIDTH] IN BLOOD BY AUTOMATED COUNT: 15.5 % (ref 12.3–15.4)
GLOBULIN SER-MCNC: 2.6 G/DL (ref 1.5–4.5)
GLUCOSE SERPL-MCNC: 151 MG/DL (ref 65–99)
HCT VFR BLD AUTO: 47.1 % (ref 34–46.6)
HDLC SERPL-MCNC: 55 MG/DL
HGB BLD-MCNC: 15 G/DL (ref 11.1–15.9)
IMM GRANULOCYTES # BLD: 0 X10E3/UL (ref 0–0.1)
IMM GRANULOCYTES NFR BLD: 0 %
LDLC SERPL CALC-MCNC: 75 MG/DL (ref 0–99)
LYMPHOCYTES # BLD AUTO: 1.9 X10E3/UL (ref 0.7–3.1)
LYMPHOCYTES NFR BLD AUTO: 12 %
MCH RBC QN AUTO: 31 PG (ref 26.6–33)
MCHC RBC AUTO-ENTMCNC: 31.8 G/DL (ref 31.5–35.7)
MCV RBC AUTO: 97 FL (ref 79–97)
MICRODELETION SYND BLD/T FISH: NORMAL
MONOCYTES # BLD AUTO: 0.4 X10E3/UL (ref 0.1–0.9)
MONOCYTES NFR BLD AUTO: 3 %
NEUTROPHILS # BLD AUTO: 12.6 X10E3/UL (ref 1.4–7)
NEUTROPHILS NFR BLD AUTO: 82 %
PLATELET # BLD AUTO: 274 X10E3/UL (ref 150–379)
POTASSIUM SERPL-SCNC: 4.7 MMOL/L (ref 3.5–5.2)
PROT SERPL-MCNC: 6.6 G/DL (ref 6–8.5)
RBC # BLD AUTO: 4.84 X10E6/UL (ref 3.77–5.28)
SL AMB EGFR AFRICAN AMERICAN: 101 ML/MIN/1.73
SL AMB EGFR NON AFRICAN AMERICAN: 87 ML/MIN/1.73
SODIUM SERPL-SCNC: 140 MMOL/L (ref 134–144)
TRIGL SERPL-MCNC: 52 MG/DL (ref 0–149)
WBC # BLD AUTO: 15.3 X10E3/UL (ref 3.4–10.8)

## 2018-02-05 NOTE — PROGRESS NOTES
Looks like pt had favorable findings on her breast eval   Looks like she was sen at [address] [place] [address] for her breast lump and they ordered her studies  We should make sure they followed up with results    Looks like she will need follow up studies in 6 months but I assume she was already contacted, if not she may want to give the ordering doc a call

## 2018-03-22 DIAGNOSIS — R20.2 PARESTHESIA OF LOWER LIMB: Primary | ICD-10-CM

## 2018-03-26 RX ORDER — GABAPENTIN 600 MG/1
TABLET ORAL
Qty: 120 TABLET | Refills: 2 | Status: SHIPPED | OUTPATIENT
Start: 2018-03-26 | End: 2018-07-07 | Stop reason: SDUPTHER

## 2018-04-24 PROBLEM — N63.20 LEFT BREAST MASS: Status: ACTIVE | Noted: 2017-08-24

## 2018-04-24 PROBLEM — R53.83 FATIGUE: Status: ACTIVE | Noted: 2017-06-05

## 2018-04-24 PROBLEM — T46.6X5A ADVERSE REACTION TO STATIN MEDICATION: Status: ACTIVE | Noted: 2017-03-03

## 2018-04-24 RX ORDER — LANCETS
EACH MISCELLANEOUS
COMMUNITY
Start: 2014-03-28 | End: 2020-07-07

## 2018-04-24 RX ORDER — CANAGLIFLOZIN AND METFORMIN HYDROCHLORIDE 150; 1000 MG/1; MG/1
TABLET, FILM COATED, EXTENDED RELEASE ORAL 2 TIMES DAILY
COMMUNITY
Start: 2018-02-08 | End: 2019-09-12

## 2018-04-24 RX ORDER — INSULIN GLARGINE 100 [IU]/ML
10 INJECTION, SOLUTION SUBCUTANEOUS DAILY
COMMUNITY
Start: 2018-01-03 | End: 2018-04-25

## 2018-04-24 RX ORDER — ESCITALOPRAM OXALATE 20 MG/1
1 TABLET ORAL DAILY
COMMUNITY
Start: 2017-06-05 | End: 2018-04-25

## 2018-04-24 RX ORDER — RAMIPRIL 2.5 MG/1
1 CAPSULE ORAL DAILY
COMMUNITY
Start: 2016-12-01 | End: 2018-09-14 | Stop reason: SDUPTHER

## 2018-04-24 RX ORDER — ALPRAZOLAM 0.25 MG/1
TABLET ORAL
COMMUNITY
Start: 2017-03-28 | End: 2019-08-19 | Stop reason: ALTCHOICE

## 2018-04-24 RX ORDER — ONDANSETRON HYDROCHLORIDE 8 MG/1
1 TABLET, FILM COATED ORAL EVERY 8 HOURS
COMMUNITY
Start: 2016-12-29 | End: 2019-09-12

## 2018-04-24 RX ORDER — REPAGLINIDE 2 MG/1
2 TABLET ORAL
COMMUNITY
Start: 2018-02-08 | End: 2022-04-08

## 2018-04-25 ENCOUNTER — TRANSCRIBE ORDERS (OUTPATIENT)
Dept: ADMINISTRATIVE | Facility: HOSPITAL | Age: 43
End: 2018-04-25

## 2018-04-25 ENCOUNTER — OFFICE VISIT (OUTPATIENT)
Dept: FAMILY MEDICINE CLINIC | Facility: CLINIC | Age: 43
End: 2018-04-25
Payer: COMMERCIAL

## 2018-04-25 VITALS
BODY MASS INDEX: 27.97 KG/M2 | SYSTOLIC BLOOD PRESSURE: 124 MMHG | TEMPERATURE: 97 F | HEART RATE: 100 BPM | DIASTOLIC BLOOD PRESSURE: 82 MMHG | HEIGHT: 62 IN | RESPIRATION RATE: 16 BRPM | WEIGHT: 152 LBS

## 2018-04-25 DIAGNOSIS — M79.9 DISORDER OF SOFT TISSUE: Primary | ICD-10-CM

## 2018-04-25 DIAGNOSIS — IMO0002 DIABETES MELLITUS WITH NEUROLOGICAL MANIFESTATIONS, UNCONTROLLED: ICD-10-CM

## 2018-04-25 DIAGNOSIS — N64.4 BREAST PAIN: ICD-10-CM

## 2018-04-25 DIAGNOSIS — M79.89 SOFT TISSUE MASS: Primary | ICD-10-CM

## 2018-04-25 PROCEDURE — 99214 OFFICE O/P EST MOD 30 MIN: CPT | Performed by: NURSE PRACTITIONER

## 2018-04-25 NOTE — PROGRESS NOTES
Assessment/Plan:    Suspect abdominal wall concern is r/t SQ injections  There is bruising over the site  However, will US d/t pt's concern  No discrete breast masses appreciated on exam   Mammo/US ordered for breast pain  Problem List Items Addressed This Visit     Diabetes mellitus with neurological manifestations, uncontrolled ([address] )     Has upcoming f/u with endo  Sugars have been stable           Other Visit Diagnoses     Soft tissue mass    -  Primary    Relevant Orders    US extremity soft tissue    Breast pain        Relevant Orders    US breast left limited (diagnostic)    Mammo diagnostic left w cad          There are no Patient Instructions on file for this visit  Return if symptoms worsen or fail to improve  Subjective:      Patient ID: Chaz Riggs is a 44 y o  female  Chief Complaint   Patient presents with    Abdominal Pain     States that she has a tender palpable lump in her right abdomen which she noticed 1 1/2 weeks ago [facility] LPN       Here today for evaluation of lump in abdomen that she noticed almost 2 weeks ago  IT is painful to the touch  She lost a lot of weight and has some sagging skin, feels it is more prominent over the lump  Used to inject Victoza in that area, but hasn't in the past 2 months because she is alternating sites  Does not feel that this was there when she was injecting  Also concerned about left breast pain and a palpable abnormality behind the nipple  She has this for the past couple of months  Last mammogram 6 months ago  No skin changes or nipple discharge  The following portions of the patient's history were reviewed and updated as appropriate: allergies, current medications, past family history, past medical history, past social history, past surgical history and problem list     Review of Systems   Constitutional: Negative  Genitourinary:        See HPI   Skin:        See HPI   All other systems reviewed and are negative  Current Outpatient Prescriptions   Medication Sig Dispense Refill    ACCU-CHEK SOFTCLIX LANCETS lancets by Does not apply route      ALPRAZolam (XANAX) 0 25 mg tablet Take by mouth      gabapentin (NEURONTIN) 600 MG tablet TAKE 1 TABLET 4 TIMES DAILY  120 tablet 2    glucose blood (ONE TOUCH ULTRA TEST) test strip by In Vitro route 2 (two) times a day      Insulin Pen Needle (NOVOFINE) 32G X 6 MM MISC by Does not apply route      INVOKAMET -1000 MG TB24 2 (two) times a day        Liraglutide (VICTOZA) 18 MG/3ML SOPN Inject under the skin daily      Multiple Vitamins-Minerals (DAILY MULTIVITAMIN PO) Take by mouth      ondansetron (ZOFRAN) 8 mg tablet Take 1 tablet by mouth every 8 (eight) hours      ramipril (ALTACE) 2 5 mg capsule Take 1 capsule by mouth daily      repaglinide (PRANDIN) 2 mg tablet 2 mg daily         No current facility-administered medications for this visit  Objective:    /82   Pulse 100   Temp (!) 97 °F (36 1 °C)   Resp 16   Ht 5' 2" (1 575 m)   Wt 68 9 kg (152 lb)   LMP 04/22/2018   BMI 27 80 kg/m²        Physical Exam   Constitutional: She appears well-developed and well-nourished  HENT:   Right Ear: Tympanic membrane, external ear and ear canal normal    Left Ear: Tympanic membrane, external ear and ear canal normal    Nose: No mucosal edema  Mouth/Throat: Oropharynx is clear and moist and mucous membranes are normal    Eyes: Conjunctivae are normal    Cardiovascular: Normal rate, regular rhythm and normal heart sounds  Pulmonary/Chest: Effort normal and breath sounds normal  Right breast exhibits no inverted nipple, no mass, no nipple discharge, no skin change and no tenderness  Left breast exhibits no inverted nipple, no mass, no nipple discharge, no skin change and no tenderness  Abdominal: Bowel sounds are normal  She exhibits no distension  There is no splenomegaly or hepatomegaly  There is no tenderness     Lymphadenopathy:        Right cervical: No superficial cervical adenopathy present  Left cervical: No superficial cervical adenopathy present  Skin: No rash noted  Psychiatric: She has a normal mood and affect  Nursing note and vitals reviewed               Lavera Saint

## 2018-04-27 ENCOUNTER — HOSPITAL ENCOUNTER (OUTPATIENT)
Dept: RADIOLOGY | Facility: HOSPITAL | Age: 43
Discharge: HOME | End: 2018-04-27
Payer: COMMERCIAL

## 2018-04-27 DIAGNOSIS — M79.9 DISORDER OF SOFT TISSUE: ICD-10-CM

## 2018-04-27 PROCEDURE — 76705 ECHO EXAM OF ABDOMEN: CPT

## 2018-04-30 ENCOUNTER — TELEPHONE (OUTPATIENT)
Dept: FAMILY MEDICINE CLINIC | Facility: CLINIC | Age: 43
End: 2018-04-30

## 2018-05-01 NOTE — TELEPHONE ENCOUNTER
Called reading room they will have them read the report  Pt is aware we had to call over   Carmen Liner, [place]

## 2018-05-02 NOTE — TELEPHONE ENCOUNTER
Spoke with pt, she is aware of results and to continue with warm compresses   No further action needed   Sindhu Burks

## 2018-05-02 NOTE — TELEPHONE ENCOUNTER
It just shows a hematoma, which is from doing her injections in that site  She should apply warm compresses  This may take some time to resolve

## 2018-05-07 DIAGNOSIS — E11.42 DIABETIC POLYNEUROPATHY ASSOCIATED WITH TYPE 2 DIABETES MELLITUS (HCC): Primary | ICD-10-CM

## 2018-06-27 ENCOUNTER — OFFICE VISIT (OUTPATIENT)
Dept: PLASTIC SURGERY | Facility: CLINIC | Age: 43
End: 2018-06-27
Payer: COMMERCIAL

## 2018-06-27 VITALS — WEIGHT: 147 LBS | HEIGHT: 62 IN | BODY MASS INDEX: 27.05 KG/M2

## 2018-06-27 DIAGNOSIS — E65 ABDOMINAL PANNUS: ICD-10-CM

## 2018-06-27 DIAGNOSIS — L30.4 INTERTRIGO: ICD-10-CM

## 2018-06-27 DIAGNOSIS — F17.210 CIGARETTE NICOTINE DEPENDENCE WITHOUT COMPLICATION: ICD-10-CM

## 2018-06-27 DIAGNOSIS — IMO0002 DIABETES MELLITUS WITH NEUROLOGICAL MANIFESTATIONS, UNCONTROLLED: Primary | ICD-10-CM

## 2018-06-27 PROCEDURE — 99242 OFF/OP CONSLTJ NEW/EST SF 20: CPT | Performed by: PLASTIC SURGERY

## 2018-06-29 NOTE — PROGRESS NOTES
Assessment/Plan:    No problem-specific Assessment & Plan notes found for this encounter  Diagnoses and all orders for this visit:    Diabetes mellitus with neurological manifestations, uncontrolled ([address] )    Cigarette nicotine dependence without complication    Abdominal pannus    Intertrigo      Nicola Loaiza is a 44years old female presenting with redundant skin/abdominal pannus due to a history of massive weight loss ( 100 lbs) with diet and exercise that is interfering with her daily activities and that she would like removed  It is my recommendation that patient will benefit from a panniculectomy  We had a lengthy discussion about what is involved in a panniculectomy vs abdominoplasty  A panniculectomy involves the removal of skin and soft tissue only with the purpose of this procedure being reduction of the overhanging skin on the lower abdomen only for hygiene, wound care, etc  Further reduction of abdominal skin or excess fat would require a more extensive procedure such as an abdominoplasty  I also explained that panniculectomy does not correct abdominal wall fascial laxity, which would also require an abdominoplasty procedure for correction  The operative details and expected post-operative course were outlined  We discussed the need of a wound vac/negative pressure therapy to help with healing of the incision, and the risk of wound breakdown and delayed wound healing  I also explained that potential complications, both major and minor, included, but were not limited to: pain, bleeding, infection, hematoma, seroma, the need for drains, wound dehiscence, skin necrosis, wound infection, excessive widening or scarring of the wounds, abdominal wall bulge or hernia, need for further surgery, asymmetry, possible loss of the umbilicus, or unacceptable cosmetic result  The risks of general anesthesia and medical complications including MI, DVT/PE, stroke, pneumonia, and death were reviewed      The patient is a smoker and was informed that the surgery would only be done if they were able to completely refrain from smoking and all nicotine products for 4 weeks before surgery  The patient understands that urine testing will be done to confirm abstinence from smoking and that surgery will be cancelled if the patient is positive for nicotine at any time  The patient also understands that they will have to refrain from smoking until all of the wounds are healed, at least 4 weeks following their surgery  They also understand that, even if smoking and nicotine is quit, the risk for complications is increased due to their smoking history  The patient understands the risks and what is involved with the surgery and would like to proceed with scheduling  Photos were taken today with the patient's consent  We will plan to see this patient back for a preoperative visit once surgery is scheduled  A total of 45 minutes of face-to-face time was spent in this encounter, of which >50% was spent in counseling  Glenda Camacho MD   [facility]   [address]   [address]   [address]   Office: [phone]        Subjective:      Patient ID: Mikie Martinez is a 44 y o  female  Mrs Chelsea Tello is a 44years old female  presenting with excess abdominal tissue that she wants removed due to interference with her activities of daily living and cosmetic appearance  Her highest weight was 250 lbs, and he is now 147 lbs; his weight has been stable for over years  She lost all of his weight through diet and exercise  Pertaining to the excess skin:  The patient does not have an associated ventral hernia    The patient does not have difficulty getting in and out of bed  The patient does not have difficulty standing and walking straight  The patient does not have difficulty tying her shoes  The patient does not have difficulty crossing her legs  The patient does have difficulty finding clothes that fit appropriately  The patient does have recurrent skin rashes, treated with nystatin powder for about 3 years   The patient does have skin infections  The patient does have foul odors  The patient does not have non-healing skin ulcers   The patient does not have back pain    Patient manifest that she is a daily active smoker and is an insulin dependent diabetes diagnoses in 2012  No prior abdominal surgeries  The following portions of the patient's history were reviewed and updated as appropriate: allergies, current medications, past family history, past medical history, past social history, past surgical history and problem list     Review of Systems   Constitutional: Negative  HENT: Negative  Eyes: Negative  Respiratory: Negative  Cardiovascular: Negative  Gastrointestinal: Negative  Endocrine: Negative  Genitourinary: Negative  Musculoskeletal: Negative  Skin: Positive for rash  Allergic/Immunologic: Negative  Neurological: Negative  Hematological: Negative  Psychiatric/Behavioral: The patient is nervous/anxious  Objective:      Ht 5' 2" (1 575 m)   Wt 66 7 kg (147 lb)   BMI 26 89 kg/m²          Physical Exam   Constitutional: She appears well-developed and well-nourished  HENT:   Head: Normocephalic  Hoarse voice   Eyes: Conjunctivae are normal  Pupils are equal, round, and reactive to light  Neck: Normal range of motion  Neck supple  Cardiovascular: Normal rate  Pulmonary/Chest: Effort normal    Abdominal: Soft     Soft/NT/ND, no palpable masses/hernias, 1 cm upper abdominal rectus diastasis, no organomegaly, redundant abdominal soft tissue and skin in both the vertical and horizontal dimensions, striae appreciated mostly below the level of the umbilicus, pannus hangs 2 5 cm below pubis, mild centrally rash and thin skin, no ulceration

## 2018-06-29 NOTE — PROGRESS NOTES
Mrs Halle Chun is a 44years old female  presenting with excess abdominal tissue that she wants removed due to interference with her activities of daily living and cosmetic appearance  Her highest weight was 250 lbs, and he is now 147 lbs; his weight has been stable for over years  She lost all of his weight through diet and exercise  Pertaining to the excess skin:  · The patient does not have an associated ventral hernia  · The patient does not have difficulty getting in and out of bed  · The patient does not have difficulty standing and walking straight  · The patient does not have difficulty tying her shoes  · The patient does not have difficulty crossing her legs  · The patient does have difficulty finding clothes that fit appropriately  · The patient does have recurrent skin rashes, treated with nystatin powder for about 3 years   · The patient does have skin infections  · The patient does have foul odors  · The patient does not have non-healing skin ulcers   · The patient does not have back pain    Patient manifest that she is a daily active smoker and is an insulin dependent diabetes diagnoses in 2012  No prior abdominal surgeries

## 2018-07-07 DIAGNOSIS — R20.2 PARESTHESIA OF LOWER LIMB: ICD-10-CM

## 2018-07-09 RX ORDER — GABAPENTIN 600 MG/1
TABLET ORAL
Qty: 120 TABLET | Refills: 2 | Status: SHIPPED | OUTPATIENT
Start: 2018-07-09 | End: 2019-01-17 | Stop reason: ALTCHOICE

## 2018-09-14 DIAGNOSIS — E11.42 DIABETIC POLYNEUROPATHY ASSOCIATED WITH TYPE 2 DIABETES MELLITUS (HCC): Primary | ICD-10-CM

## 2018-09-16 PROCEDURE — 4010F ACE/ARB THERAPY RXD/TAKEN: CPT | Performed by: NURSE PRACTITIONER

## 2018-09-16 RX ORDER — RAMIPRIL 2.5 MG/1
CAPSULE ORAL
Qty: 90 CAPSULE | Refills: 0 | Status: SHIPPED | OUTPATIENT
Start: 2018-09-16 | End: 2019-02-22 | Stop reason: SDUPTHER

## 2018-10-19 ENCOUNTER — OFFICE VISIT (OUTPATIENT)
Dept: PODIATRY | Facility: CLINIC | Age: 43
End: 2018-10-19
Payer: COMMERCIAL

## 2018-10-19 VITALS
DIASTOLIC BLOOD PRESSURE: 69 MMHG | SYSTOLIC BLOOD PRESSURE: 115 MMHG | HEIGHT: 62 IN | WEIGHT: 147 LBS | BODY MASS INDEX: 27.05 KG/M2

## 2018-10-19 DIAGNOSIS — R20.2 PARESTHESIA: Primary | ICD-10-CM

## 2018-10-19 DIAGNOSIS — G89.4 CHRONIC PAIN SYNDROME: ICD-10-CM

## 2018-10-19 DIAGNOSIS — R20.2 PARESTHESIA OF LOWER LIMB: ICD-10-CM

## 2018-10-19 DIAGNOSIS — Q66.52 CONGENITAL PES PLANUS OF LEFT FOOT: ICD-10-CM

## 2018-10-19 DIAGNOSIS — M72.2 PLANTAR FASCIITIS: ICD-10-CM

## 2018-10-19 DIAGNOSIS — M77.52 CAPSULITIS OF METATARSOPHALANGEAL (MTP) JOINT OF LEFT FOOT: ICD-10-CM

## 2018-10-19 DIAGNOSIS — Q66.51 CONGENITAL PES PLANUS OF RIGHT FOOT: ICD-10-CM

## 2018-10-19 PROCEDURE — 73620 X-RAY EXAM OF FOOT: CPT | Performed by: PODIATRIST

## 2018-10-19 PROCEDURE — 99213 OFFICE O/P EST LOW 20 MIN: CPT | Performed by: PODIATRIST

## 2018-10-19 PROCEDURE — L3000 FT INSERT UCB BERKELEY SHELL: HCPCS | Performed by: PODIATRIST

## 2018-10-19 RX ORDER — GABAPENTIN 800 MG/1
800 TABLET ORAL 4 TIMES DAILY
Qty: 120 TABLET | Refills: 2 | Status: SHIPPED | OUTPATIENT
Start: 2018-10-19 | End: 2019-02-13 | Stop reason: SDUPTHER

## 2018-10-19 NOTE — PROGRESS NOTES
Assessment/Plan:    Discussed side effects of medication drowsiness, dizziness, addiction dependency  Patient instructed to see neurologist to follow-up for chronic pain discussed referral to pain management  Patient given handout and instruction on stretching exercises  Discussed stretching icing  Discussed proper shoes and proper shoe fit    X-ray bilateral feet DP lateral there is increased 1st intermetatarsal angle bilateral, decreased calcaneal inclination angle, some joint space narrowing 1st MP joint no fracture nor dislocation    Patient was casted for orthotics bilateral feet sulcus length graphite no arch fill, internal heel    Discussed injection physical therapy if not improving  Follow-up 1 month     Diagnoses and all orders for this visit:    Paresthesia  -     gabapentin (NEURONTIN) 800 mg tablet; Take 1 tablet (800 mg total) by mouth 4 (four) times a day    Paresthesia of lower limb    Chronic pain syndrome    Capsulitis of metatarsophalangeal (MTP) joint of left foot    Plantar fasciitis    Congenital pes planus of right foot    Congenital pes planus of left foot          Subjective:      Patient ID: Александр Martin is a 44 y o  female  Patient is a diabetic female previous A1c was 8 5  Patient has a long history of diabetes and peripheral neuropathy  Patient history of chronic pain syndrome  Patient gets pain and burning in feet  Patient has currently taking gabapentin 600 mg 4 times a day  Patient says that she was previously on 800 mg it was getting better relief from this  Patient denies any significant side effects from the medication  Patient has long history of plantar fasciitis  Patient had been wearing orthotics and says they were helping but says she lost orthotics and has been wearing over-the-counter orthotics but these have not helped  Patient has been having pain in 1st MP joint plantar on off mostly left mostly when walking    Patient is walking part of the day at her job   No history of injury  Past Medical History:   Diagnosis Date    Acute gastritis     [id number] RESOLVED    Breast pain     [id number] RESOLVED    Diabetes [facility] [facility] )     MELLITUS    Viral gastroenteritis     [id number] RESOLVED       Past Surgical History:   Procedure Laterality Date    HAND SURGERY      SKIN BIOPSY         No Known Allergies      Current Outpatient Prescriptions:     ACCU-CHEK SOFTCLIX LANCETS lancets, by Does not apply route, Disp: , Rfl:     ALPRAZolam (XANAX) 0 25 mg tablet, Take by mouth, Disp: , Rfl:     gabapentin (NEURONTIN) 600 MG tablet, take 1 tablet by mouth four times a day, Disp: 120 tablet, Rfl: 2    gabapentin (NEURONTIN) 800 mg tablet, Take 1 tablet (800 mg total) by mouth 4 (four) times a day, Disp: 120 tablet, Rfl: 2    Insulin Pen Needle (NOVOFINE) 32G X 6 MM MISC, by Does not apply route, Disp: , Rfl:     INVOKAMET -1000 MG TB24, 2 (two) times a day  , Disp: , Rfl:     Liraglutide (VICTOZA) 18 MG/3ML SOPN, Inject under the skin daily, Disp: , Rfl:     Multiple Vitamins-Minerals (DAILY MULTIVITAMIN PO), Take by mouth, Disp: , Rfl:     ondansetron (ZOFRAN) 8 mg tablet, Take 1 tablet by mouth every 8 (eight) hours, Disp: , Rfl:     ONE TOUCH ULTRA TEST test strip, TEST twice a day, Disp: 100 each, Rfl: 1    ramipril (ALTACE) 2 5 mg capsule, take 1 capsule by mouth once daily, Disp: 90 capsule, Rfl: 0    repaglinide (PRANDIN) 2 mg tablet, 2 mg daily  , Disp: , Rfl:     Patient Active Problem List   Diagnosis    Adverse reaction to statin medication    Allergic asthma, mild intermittent, uncomplicated    Anxiety    Diabetes mellitus with neurological manifestations, uncontrolled ([facility] [facility] )    Diabetes mellitus with polyneuropathy (HCC)    Dysesthesia    Fatigue    Left breast mass    Low back pain    Nicotine dependence    Obesity       Review of Systems   Constitutional: Negative  HENT: Negative  Eyes: Negative  Respiratory: Negative  Cardiovascular: Negative  Gastrointestinal: Negative  Endocrine: Negative  Genitourinary: Negative  Musculoskeletal: Positive for arthralgias and back pain  Skin: Negative  Allergic/Immunologic: Negative  Neurological: Negative  Hematological: Negative  Psychiatric/Behavioral: Negative  Objective:  Patient's shoes and socks were removed, feet examined  /69   Ht 5' 2" (1 575 m)   Wt 66 7 kg (147 lb)   BMI 26 89 kg/m²          Physical Exam   Cardiovascular:   Pulses:       Dorsalis pedis pulses are 2+ on the right side, and 2+ on the left side  Posterior tibial pulses are 2+ on the right side, and 2+ on the left side  Foot Exam    General  General Appearance: appears stated age and healthy   Orientation: alert and oriented to person, place, and time   Affect: appropriate   Gait: antalgic       Right Foot/Ankle     Inspection and Palpation  Arch: pes planus    Neurovascular  Dorsalis pedis: 2+  Posterior tibial: 2+      Left Foot/Ankle      Inspection and Palpation  Arch: pes planus    Neurovascular  Dorsalis pedis: 2+  Posterior tibial: 2+            Right Foot/Ankle   Right Foot Inspection        Vascular    The right DP pulse is 2+  The right PT pulse is 2+  Right Toe  - Comprehensive Exam  Arch: pes planus    Left Foot/Ankle  Left Foot Inspection                                             Vascular    The left DP pulse is 2+  The left PT pulse is 2+     Left Toe  - Comprehensive Exam  Arch: pes planus        Positive pain on palpation plantar medial heel bilateral  Significant pes planus bilateral  Moderate equinus bilateral  Negative erythema no open wound no signs of infection  Moderate hallux limitus bilateral  There is pain on palpation of left plantar 1st metatarsal head there is pain along the flexor tendon no swelling no erythema  Negative Tinel sign tarsal tunnel bilateral  Muscle strength 5/5 all groups bilateral feet and ankle

## 2018-11-20 ENCOUNTER — OFFICE VISIT (OUTPATIENT)
Dept: PODIATRY | Facility: CLINIC | Age: 43
End: 2018-11-20
Payer: COMMERCIAL

## 2018-11-20 VITALS
DIASTOLIC BLOOD PRESSURE: 89 MMHG | HEIGHT: 62 IN | SYSTOLIC BLOOD PRESSURE: 125 MMHG | WEIGHT: 147 LBS | BODY MASS INDEX: 27.05 KG/M2

## 2018-11-20 DIAGNOSIS — R20.2 PARESTHESIA OF LOWER LIMB: ICD-10-CM

## 2018-11-20 DIAGNOSIS — M72.2 PLANTAR FASCIITIS: ICD-10-CM

## 2018-11-20 DIAGNOSIS — G89.4 CHRONIC PAIN SYNDROME: Primary | ICD-10-CM

## 2018-11-20 DIAGNOSIS — M77.52 CAPSULITIS OF METATARSOPHALANGEAL (MTP) JOINT OF LEFT FOOT: ICD-10-CM

## 2018-11-20 PROCEDURE — 99213 OFFICE O/P EST LOW 20 MIN: CPT | Performed by: PODIATRIST

## 2018-11-20 NOTE — PROGRESS NOTES
Assessment/Plan:    Discussed proper shoes and orthotics  Discussed NSAIDs risks and benefits  Discussed injection if not improving  Follow-up 1 month     Diagnoses and all orders for this visit:    Chronic pain syndrome    Capsulitis of metatarsophalangeal (MTP) joint of left foot    Paresthesia of lower limb    Plantar fasciitis          Subjective:      Patient ID: Lilli Damon is a 44 y o  female  Patient is a diabetic female previous A1c was 8 5  Patient has been taking high-dose gabapentin has been taking 800 mg 3 or 4 times a day as needed  Pain is significantly improved  Patient now rates pain 3/10 pain scale says the neuropathy pain is significantly improved on the higher dose  Patient is still having some pain in left 1st MP joint and left heel has been stretching icing pain is significantly improved rates pain 4/10 pain is on off  Patient has been wearing over-the-counter orthotics          Past Medical History:   Diagnosis Date    Acute gastritis     [id number] RESOLVED    Breast pain     [id number] RESOLVED    Diabetes [facility] [address] )     MELLITUS    Viral gastroenteritis     [id number] RESOLVED       Past Surgical History:   Procedure Laterality Date    HAND SURGERY      SKIN BIOPSY         No Known Allergies      Current Outpatient Prescriptions:     ACCU-CHEK SOFTCLIX LANCETS lancets, by Does not apply route, Disp: , Rfl:     ALPRAZolam (XANAX) 0 25 mg tablet, Take by mouth, Disp: , Rfl:     gabapentin (NEURONTIN) 600 MG tablet, take 1 tablet by mouth four times a day, Disp: 120 tablet, Rfl: 2    gabapentin (NEURONTIN) 800 mg tablet, Take 1 tablet (800 mg total) by mouth 4 (four) times a day, Disp: 120 tablet, Rfl: 2    Insulin Pen Needle (NOVOFINE) 32G X 6 MM MISC, by Does not apply route, Disp: , Rfl:     INVOKAMET -1000 MG TB24, 2 (two) times a day  , Disp: , Rfl:     Liraglutide (VICTOZA) 18 MG/3ML SOPN, Inject under the skin daily, Disp: , Rfl:     Multiple Vitamins-Minerals (DAILY MULTIVITAMIN PO), Take by mouth, Disp: , Rfl:     ondansetron (ZOFRAN) 8 mg tablet, Take 1 tablet by mouth every 8 (eight) hours, Disp: , Rfl:     ONE TOUCH ULTRA TEST test strip, TEST twice a day, Disp: 100 each, Rfl: 1    ramipril (ALTACE) 2 5 mg capsule, take 1 capsule by mouth once daily, Disp: 90 capsule, Rfl: 0    repaglinide (PRANDIN) 2 mg tablet, 2 mg daily  , Disp: , Rfl:     Patient Active Problem List   Diagnosis    Adverse reaction to statin medication    Allergic asthma, mild intermittent, uncomplicated    Anxiety    Diabetes mellitus with neurological manifestations, uncontrolled (HCC)    Diabetes mellitus with polyneuropathy (HCC)    Dysesthesia    Fatigue    Left breast mass    Low back pain    Nicotine dependence    Obesity       Review of Systems   Constitutional: Negative  HENT: Negative  Eyes: Negative  Respiratory: Negative  Cardiovascular: Negative  Gastrointestinal: Negative  Endocrine: Negative  Genitourinary: Negative  Musculoskeletal: Positive for arthralgias and back pain  Skin: Negative  Allergic/Immunologic: Negative  Neurological: Negative  Hematological: Negative  Psychiatric/Behavioral: Negative  Objective:  Patient's shoes and socks were removed, feet examined  /89   Ht 5' 2" (1 575 m)   Wt 66 7 kg (147 lb)   BMI 26 89 kg/m²          Physical Exam   Cardiovascular:   Pulses:       Dorsalis pedis pulses are 2+ on the right side, and 2+ on the left side  Posterior tibial pulses are 2+ on the right side, and 2+ on the left side         Foot Exam    General  General Appearance: appears stated age and healthy   Orientation: alert and oriented to person, place, and time   Affect: appropriate   Gait: antalgic       Right Foot/Ankle     Inspection and Palpation  Arch: pes planus    Neurovascular  Dorsalis pedis: 2+  Posterior tibial: 2+      Left Foot/Ankle      Inspection and Palpation  Arch: pes planus    Neurovascular  Dorsalis pedis: 2+  Posterior tibial: 2+            Right Foot/Ankle   Right Foot Inspection        Vascular    The right DP pulse is 2+  The right PT pulse is 2+  Right Toe  - Comprehensive Exam  Arch: pes planus    Left Foot/Ankle  Left Foot Inspection                                             Vascular    The left DP pulse is 2+  The left PT pulse is 2+  Left Toe  - Comprehensive Exam  Arch: pes planus        Positive pain on palpation plantar medial heel bilateral  Significant pes planus bilateral  Moderate equinus bilateral  Negative erythema no open wound no signs of infection  Moderate hallux limitus bilateral  There is pain on palpation of left plantar 1st metatarsal head there is pain along the flexor tendon no swelling no erythema  Negative Tinel sign tarsal tunnel bilateral  Muscle strength 5/5 all groups bilateral feet and ankle  Mild swelling left plantar 1st MP joint  Mild pain on dorsiflexion

## 2019-01-08 ENCOUNTER — TELEPHONE (OUTPATIENT)
Dept: FAMILY MEDICINE CLINIC | Facility: CLINIC | Age: 44
End: 2019-01-08

## 2019-01-10 NOTE — TELEPHONE ENCOUNTER
Spoke to patient made aware patient needs a  follow up appointment regarding chronic conditions and coordinating with any specialist they may be seeing  States will call back after she sees endo at the end of this month  af/stefan

## 2019-01-17 ENCOUNTER — OFFICE VISIT (OUTPATIENT)
Dept: FAMILY MEDICINE CLINIC | Facility: CLINIC | Age: 44
End: 2019-01-17
Payer: COMMERCIAL

## 2019-01-17 VITALS
HEIGHT: 62 IN | RESPIRATION RATE: 16 BRPM | WEIGHT: 150 LBS | SYSTOLIC BLOOD PRESSURE: 120 MMHG | BODY MASS INDEX: 27.6 KG/M2 | DIASTOLIC BLOOD PRESSURE: 78 MMHG | HEART RATE: 82 BPM | TEMPERATURE: 97.4 F

## 2019-01-17 DIAGNOSIS — J02.9 ACUTE PHARYNGITIS, UNSPECIFIED ETIOLOGY: Primary | ICD-10-CM

## 2019-01-17 PROCEDURE — 99213 OFFICE O/P EST LOW 20 MIN: CPT | Performed by: INTERNAL MEDICINE

## 2019-01-17 PROCEDURE — 3008F BODY MASS INDEX DOCD: CPT | Performed by: INTERNAL MEDICINE

## 2019-01-17 RX ORDER — AMOXICILLIN 875 MG/1
875 TABLET, COATED ORAL 2 TIMES DAILY
Qty: 20 TABLET | Refills: 0 | Status: SHIPPED | OUTPATIENT
Start: 2019-01-17 | End: 2019-01-27

## 2019-01-17 NOTE — LETTER
January 17, 2019     Patient: Aleja Yeung   YOB: 1975   Date of Visit: 1/17/2019       To Whom it May Concern:    Viri Dooley is under my professional care  She was seen in my office on 1/17/2019  She is excused due to illness 1/18/19  If you have any questions or concerns, please don't hesitate to call           Sincerely,          Barbara Arce MD        CC: No Recipients

## 2019-01-17 NOTE — PROGRESS NOTES
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  Feels she has one again with low grade fever, severe sore throat, ears hurt  No congestion  Using cough drops and liquids  The following portions of the patient's history were reviewed and updated as appropriate: allergies, current medications, past family history, past medical history, past social history, past surgical history and problem list     Review of Systems   Constitutional: Positive for fever  HENT: Positive for sore throat  Negative for congestion  Respiratory: Negative  Negative for cough  Cardiovascular: Negative  Current Outpatient Prescriptions   Medication Sig Dispense Refill    ACCU-CHEK SOFTCLIX LANCETS lancets by Does not apply route      gabapentin (NEURONTIN) 800 mg tablet Take 1 tablet (800 mg total) by mouth 4 (four) times a day 120 tablet 2    Insulin Pen Needle (NOVOFINE) 32G X 6 MM MISC by Does not apply route      INVOKAMET -1000 MG TB24 2 (two) times a day        Liraglutide (VICTOZA) 18 MG/3ML SOPN Inject under the skin daily      ondansetron (ZOFRAN) 8 mg tablet Take 1 tablet by mouth every 8 (eight) hours      ONE TOUCH ULTRA TEST test strip TEST twice a day 100 each 1    ramipril (ALTACE) 2 5 mg capsule take 1 capsule by mouth once daily 90 capsule 0    repaglinide (PRANDIN) 2 mg tablet 2 mg daily        ALPRAZolam (XANAX) 0 25 mg tablet Take by mouth      amoxicillin (AMOXIL) 875 mg tablet Take 1 tablet (875 mg total) by mouth 2 (two) times a day for 10 days 20 tablet 0     No current facility-administered medications for this visit          Objective:    /78   Pulse 82   Temp (!) 97 4 °F (36 3 °C)   Resp 16   Ht 5' 2" (1 575 m)   Wt 68 kg (150 lb)   BMI 27 44 kg/m²        Physical Exam   Constitutional: She appears well-developed and well-nourished  HENT:   Right Ear: Tympanic membrane normal    Left Ear: Tympanic membrane normal    Nose: Nose normal    Mouth/Throat: Posterior oropharyngeal edema and posterior oropharyngeal erythema present  Eyes: Conjunctivae are normal    Neck: Neck supple  No JVD present  No thyromegaly present  Cardiovascular: Normal rate, regular rhythm, normal heart sounds and intact distal pulses  Exam reveals no gallop and no friction rub  No murmur heard  Pulmonary/Chest: Effort normal and breath sounds normal  She has no wheezes  She has no rales  Musculoskeletal: She exhibits no edema  Assessment/Plan:    No problem-specific Assessment & Plan notes found for this encounter  Diagnoses and all orders for this visit:    Acute pharyngitis, unspecified etiology  -     amoxicillin (AMOXIL) 875 mg tablet; Take 1 tablet (875 mg total) by mouth 2 (two) times a day for 10 days        Rapid strep negative, but was treated due to high suspicion  Supportive care discussed, follow up if not improving  No Follow-up on file         Lavina Gosselin, MD

## 2019-01-18 ENCOUNTER — TELEPHONE (OUTPATIENT)
Dept: FAMILY MEDICINE CLINIC | Facility: CLINIC | Age: 44
End: 2019-01-18

## 2019-01-18 NOTE — TELEPHONE ENCOUNTER
Per patients requested transferred abx from [facility] to [facility]     Angelita Dalton LPN

## 2019-01-24 ENCOUNTER — TELEPHONE (OUTPATIENT)
Dept: FAMILY MEDICINE CLINIC | Facility: CLINIC | Age: 44
End: 2019-01-24

## 2019-01-24 NOTE — TELEPHONE ENCOUNTER
Pam Lirianoelidia left a message on general voicemail  She stated she was seen last Thursday and given Amoxicillian  She is diabetic and her sugars are at 240  She knows its not what she is eating but maybe the [facility] interacting with her diabetic meds      Please call patient  [phone]

## 2019-01-24 NOTE — TELEPHONE ENCOUNTER
It is usually the acute illness and not the antibiotic that is raising the glucose levels  If she is not better she needs to make an appointment  Otherwise I have no recent bloodwork to say how her glucose has been  If she is seeing an endocrinologist she may need to call them for medication review

## 2019-01-24 NOTE — TELEPHONE ENCOUNTER
Patient states She had her glucose levels pretty well controlled, 150 and below  Since she started the amoxicillin her levels have been 240-250  She states she is not eating poorly she hasnt changed anything else except the addition of the antibiotic  She is taking all of her medications at the same time  She is wondering what she should do   She states she can feel a difference in her eyes and her hands and feet when her sugar is running high  Please advise  johnson webster

## 2019-01-28 ENCOUNTER — TELEPHONE (OUTPATIENT)
Dept: FAMILY MEDICINE CLINIC | Facility: CLINIC | Age: 44
End: 2019-01-28

## 2019-01-28 NOTE — TELEPHONE ENCOUNTER
CHRISTIN  Pt has only been seen in our office for sick visits and a call was made out to pt for a follow up and pt states she sees her [address], [place]

## 2019-01-28 NOTE — TELEPHONE ENCOUNTER
Patient called very upset, she wanted us to fill her Metformin and Invokana because her specialist needed labs and she hadn't gone yet  I discussed with Jr Doctor and Dr Tonya Karimi, she hasn't been seen in our office to manage in quite some time  She was told she needs to go for bloodwork and have specialist refill

## 2019-01-29 LAB
CREAT ?TM UR-SCNC: 32 UMOL/L
EXT MICROALBUMIN URINE RANDOM: 7.8
HBA1C MFR BLD HPLC: 6.9 %
MICROALBUMIN/CREAT UR: 24.4 MG/G{CREAT}

## 2019-01-29 PROCEDURE — 3061F NEG MICROALBUMINURIA REV: CPT | Performed by: INTERNAL MEDICINE

## 2019-02-13 DIAGNOSIS — R20.2 PARESTHESIA: ICD-10-CM

## 2019-02-14 RX ORDER — GABAPENTIN 800 MG/1
TABLET ORAL
Qty: 120 TABLET | Refills: 2 | Status: SHIPPED | OUTPATIENT
Start: 2019-02-14 | End: 2019-05-16 | Stop reason: SDUPTHER

## 2019-02-18 NOTE — TELEPHONE ENCOUNTER
Spoke with patient states will call back at  Today when at work when she has her work schedule available   Af/rma

## 2019-02-22 DIAGNOSIS — E11.42 DIABETIC POLYNEUROPATHY ASSOCIATED WITH TYPE 2 DIABETES MELLITUS (HCC): ICD-10-CM

## 2019-02-22 PROCEDURE — 4010F ACE/ARB THERAPY RXD/TAKEN: CPT | Performed by: INTERNAL MEDICINE

## 2019-02-22 RX ORDER — RAMIPRIL 2.5 MG/1
CAPSULE ORAL
Qty: 90 CAPSULE | Refills: 0 | Status: SHIPPED | OUTPATIENT
Start: 2019-02-22 | End: 2019-07-18 | Stop reason: SDUPTHER

## 2019-04-05 ENCOUNTER — TELEPHONE (OUTPATIENT)
Dept: FAMILY MEDICINE CLINIC | Facility: CLINIC | Age: 44
End: 2019-04-05

## 2019-05-16 DIAGNOSIS — R20.2 PARESTHESIA: ICD-10-CM

## 2019-05-16 RX ORDER — GABAPENTIN 800 MG/1
TABLET ORAL
Qty: 120 TABLET | Refills: 2 | Status: SHIPPED | OUTPATIENT
Start: 2019-05-16 | End: 2019-08-20 | Stop reason: SDUPTHER

## 2019-07-17 LAB — HBA1C MFR BLD HPLC: 8.1 %

## 2019-07-18 DIAGNOSIS — E11.42 DIABETIC POLYNEUROPATHY ASSOCIATED WITH TYPE 2 DIABETES MELLITUS (HCC): ICD-10-CM

## 2019-07-18 RX ORDER — RAMIPRIL 2.5 MG/1
CAPSULE ORAL
Qty: 30 CAPSULE | Refills: 0 | Status: SHIPPED | OUTPATIENT
Start: 2019-07-18 | End: 2019-08-20 | Stop reason: SDUPTHER

## 2019-07-18 NOTE — TELEPHONE ENCOUNTER
Pt scheduled physical, got her in for first available on 8/19  Might need more meds before then but will call us if she does  NO further action needed at this time

## 2019-08-18 DIAGNOSIS — R20.2 PARESTHESIA: ICD-10-CM

## 2019-08-19 ENCOUNTER — OFFICE VISIT (OUTPATIENT)
Dept: FAMILY MEDICINE CLINIC | Facility: CLINIC | Age: 44
End: 2019-08-19
Payer: COMMERCIAL

## 2019-08-19 VITALS
HEIGHT: 64 IN | TEMPERATURE: 98.2 F | WEIGHT: 148 LBS | RESPIRATION RATE: 12 BRPM | DIASTOLIC BLOOD PRESSURE: 90 MMHG | BODY MASS INDEX: 25.27 KG/M2 | HEART RATE: 80 BPM | SYSTOLIC BLOOD PRESSURE: 122 MMHG

## 2019-08-19 DIAGNOSIS — Z23 NEED FOR VACCINATION: ICD-10-CM

## 2019-08-19 DIAGNOSIS — Z00.00 WELL ADULT EXAM: Primary | ICD-10-CM

## 2019-08-19 DIAGNOSIS — IMO0002 DIABETES MELLITUS WITH NEUROLOGICAL MANIFESTATIONS, UNCONTROLLED: ICD-10-CM

## 2019-08-19 LAB
LEFT EYE DIABETIC RETINOPATHY: NORMAL
LEFT EYE IMAGE QUALITY: NORMAL
LEFT EYE MACULAR EDEMA: NORMAL
LEFT EYE OTHER RETINOPATHY: NORMAL
RIGHT EYE DIABETIC RETINOPATHY: NORMAL
RIGHT EYE IMAGE QUALITY: NORMAL
RIGHT EYE MACULAR EDEMA: NORMAL
RIGHT EYE OTHER RETINOPATHY: NORMAL
SEVERITY (EYE EXAM): NORMAL

## 2019-08-19 PROCEDURE — 99396 PREV VISIT EST AGE 40-64: CPT | Performed by: INTERNAL MEDICINE

## 2019-08-19 PROCEDURE — 90471 IMMUNIZATION ADMIN: CPT

## 2019-08-19 PROCEDURE — 90715 TDAP VACCINE 7 YRS/> IM: CPT

## 2019-08-19 PROCEDURE — 3072F LOW RISK FOR RETINOPATHY: CPT | Performed by: INTERNAL MEDICINE

## 2019-08-19 RX ORDER — GABAPENTIN 800 MG/1
TABLET ORAL
Qty: 120 TABLET | Refills: 2 | OUTPATIENT
Start: 2019-08-19

## 2019-08-19 RX ORDER — SITAGLIPTIN 100 MG/1
100 TABLET, FILM COATED ORAL DAILY
Refills: 0 | COMMUNITY
Start: 2019-07-23 | End: 2020-07-07

## 2019-08-19 RX ORDER — FLUCONAZOLE 100 MG/1
TABLET ORAL
Refills: 0 | COMMUNITY
Start: 2019-07-22 | End: 2019-08-19 | Stop reason: ALTCHOICE

## 2019-08-20 ENCOUNTER — OFFICE VISIT (OUTPATIENT)
Dept: PODIATRY | Facility: CLINIC | Age: 44
End: 2019-08-20
Payer: COMMERCIAL

## 2019-08-20 VITALS
WEIGHT: 148 LBS | SYSTOLIC BLOOD PRESSURE: 123 MMHG | BODY MASS INDEX: 25.27 KG/M2 | DIASTOLIC BLOOD PRESSURE: 83 MMHG | HEIGHT: 64 IN

## 2019-08-20 DIAGNOSIS — E11.42 DIABETIC POLYNEUROPATHY ASSOCIATED WITH TYPE 2 DIABETES MELLITUS (HCC): ICD-10-CM

## 2019-08-20 DIAGNOSIS — R20.2 PARESTHESIA: ICD-10-CM

## 2019-08-20 DIAGNOSIS — G89.4 CHRONIC PAIN SYNDROME: Primary | ICD-10-CM

## 2019-08-20 DIAGNOSIS — M72.2 PLANTAR FASCIITIS: ICD-10-CM

## 2019-08-20 PROCEDURE — 4010F ACE/ARB THERAPY RXD/TAKEN: CPT | Performed by: INTERNAL MEDICINE

## 2019-08-20 PROCEDURE — 99213 OFFICE O/P EST LOW 20 MIN: CPT | Performed by: PODIATRIST

## 2019-08-20 RX ORDER — RAMIPRIL 2.5 MG/1
CAPSULE ORAL
Qty: 90 CAPSULE | Refills: 0 | Status: SHIPPED | OUTPATIENT
Start: 2019-08-20 | End: 2019-11-29 | Stop reason: SDUPTHER

## 2019-08-20 RX ORDER — GABAPENTIN 800 MG/1
800 TABLET ORAL 4 TIMES DAILY
Qty: 120 TABLET | Refills: 2 | Status: SHIPPED | OUTPATIENT
Start: 2019-08-20 | End: 2019-11-25 | Stop reason: SDUPTHER

## 2019-08-20 NOTE — PROGRESS NOTES
Assessment/Plan:    Dispensed orthotics discussed break-in period  Discussed stretching and icing  Discussed side effects of gabapentin including drowsiness, dizziness, weight gain, leg swelling patient will discontinue if any side effects  Discussed referral to pain management if pain not improving  Follow-up 1 month     Diagnoses and all orders for this visit:    Chronic pain syndrome    Paresthesia  -     gabapentin (NEURONTIN) 800 mg tablet; Take 1 tablet (800 mg total) by mouth 4 (four) times a day    Plantar fasciitis          Subjective:      Patient ID: Jay Yates is a 38 y o  female  Patient is a diabetic female previous with history of significant neuropathy  Patient has been taking gabapentin 800 mg 4 times a day and does have significant relief  Patient has had no side effects of medication  Patient does have some heel and arch pain on off  Patient has been wearing over-the-counter orthotics        Past Medical History:   Diagnosis Date    Acute gastritis     [id number] RESOLVED    Breast pain     [id number] RESOLVED    Diabetes ([facility] [address] )     MELLITUS    Viral gastroenteritis     [id number] RESOLVED       Past Surgical History:   Procedure Laterality Date    HAND SURGERY      SKIN BIOPSY         No Known Allergies      Current Outpatient Medications:     ACCU-CHEK SOFTCLIX LANCETS lancets, by Does not apply route, Disp: , Rfl:     gabapentin (NEURONTIN) 800 mg tablet, Take 1 tablet (800 mg total) by mouth 4 (four) times a day, Disp: 120 tablet, Rfl: 2    Insulin Pen Needle (NOVOFINE) 32G X 6 MM MISC, by Does not apply route, Disp: , Rfl:     INVOKAMET -1000 MG TB24, 2 (two) times a day  , Disp: , Rfl:     JANUVIA 100 MG tablet, Take 100 mg by mouth daily , Disp: , Rfl: 0    Liraglutide (VICTOZA) 18 MG/3ML SOPN, Inject under the skin daily, Disp: , Rfl:     ondansetron (ZOFRAN) 8 mg tablet, Take 1 tablet by mouth every 8 (eight) hours, Disp: , Rfl:     ONE TOUCH ULTRA TEST test strip, TEST twice a day, Disp: 100 each, Rfl: 1    ramipril (ALTACE) 2 5 mg capsule, take 1 capsule by mouth once daily, Disp: 30 capsule, Rfl: 0    repaglinide (PRANDIN) 2 mg tablet, Take 2 mg by mouth 3 (three) times a day before meals , Disp: , Rfl:     Patient Active Problem List   Diagnosis    Adverse reaction to statin medication    Allergic asthma, mild intermittent, uncomplicated    Anxiety    Diabetes mellitus with neurological manifestations, uncontrolled ([address] )    Diabetes mellitus with polyneuropathy (HCC)    Dysesthesia    Fatigue    Left breast mass    Low back pain    Nicotine dependence    Obesity       Review of Systems   Constitutional: Negative  HENT: Negative  Eyes: Negative  Respiratory: Negative  Cardiovascular: Negative  Gastrointestinal: Negative  Endocrine: Negative  Genitourinary: Negative  Musculoskeletal: Positive for arthralgias and back pain  Skin: Negative  Allergic/Immunologic: Negative  Neurological: Negative  Hematological: Negative  Psychiatric/Behavioral: Negative  Objective:  Patient's shoes and socks were removed, feet examined  /83   Ht 5' 3 75" (1 619 m)   Wt 67 1 kg (148 lb)   LMP 08/15/2019 (Exact Date)   BMI 25 60 kg/m²          Physical Exam   Cardiovascular:   Pulses:       Dorsalis pedis pulses are 2+ on the right side, and 2+ on the left side  Posterior tibial pulses are 2+ on the right side, and 2+ on the left side         Foot Exam    General  General Appearance: appears stated age and healthy   Orientation: alert and oriented to person, place, and time   Affect: appropriate   Gait: antalgic       Right Foot/Ankle     Inspection and Palpation  Arch: pes planus    Neurovascular  Dorsalis pedis: 2+  Posterior tibial: 2+      Left Foot/Ankle      Inspection and Palpation  Arch: pes planus    Neurovascular  Dorsalis pedis: 2+  Posterior tibial: 2+            Right Foot/Ankle   Right Foot Inspection        Vascular    The right DP pulse is 2+  The right PT pulse is 2+  Right Toe  - Comprehensive Exam  Arch: pes planus    Left Foot/Ankle  Left Foot Inspection                                             Vascular    The left DP pulse is 2+  The left PT pulse is 2+     Left Toe  - Comprehensive Exam  Arch: pes planus        Positive pain on palpation plantar medial heel bilateral  Significant pes planus bilateral  Moderate equinus bilateral  Negative erythema no open wound no signs of infection  Moderate hallux limitus bilateral    Negative Tinel sign tarsal tunnel bilateral  Muscle strength 5/5 all groups bilateral feet and ankle  Mild pain on palpation plantar medial heel bilateral right worse than left  No open wounds no signs of infection moderate xerosis plantar feet bilateral

## 2019-09-12 ENCOUNTER — OFFICE VISIT (OUTPATIENT)
Dept: FAMILY MEDICINE CLINIC | Facility: CLINIC | Age: 44
End: 2019-09-12
Payer: COMMERCIAL

## 2019-09-12 VITALS
DIASTOLIC BLOOD PRESSURE: 75 MMHG | RESPIRATION RATE: 16 BRPM | HEART RATE: 100 BPM | HEIGHT: 64 IN | BODY MASS INDEX: 26.12 KG/M2 | SYSTOLIC BLOOD PRESSURE: 110 MMHG | OXYGEN SATURATION: 98 % | TEMPERATURE: 97.6 F | WEIGHT: 153 LBS

## 2019-09-12 DIAGNOSIS — E11.42 DIABETIC POLYNEUROPATHY ASSOCIATED WITH TYPE 2 DIABETES MELLITUS (HCC): ICD-10-CM

## 2019-09-12 DIAGNOSIS — F32.A ANXIETY AND DEPRESSION: Primary | ICD-10-CM

## 2019-09-12 DIAGNOSIS — F41.9 ANXIETY AND DEPRESSION: Primary | ICD-10-CM

## 2019-09-12 DIAGNOSIS — IMO0002 DIABETES MELLITUS WITH NEUROLOGICAL MANIFESTATIONS, UNCONTROLLED: ICD-10-CM

## 2019-09-12 PROCEDURE — 99214 OFFICE O/P EST MOD 30 MIN: CPT | Performed by: NURSE PRACTITIONER

## 2019-09-12 RX ORDER — ALPRAZOLAM 0.25 MG/1
0.25 TABLET ORAL
Qty: 30 TABLET | Refills: 0 | Status: SHIPPED | OUTPATIENT
Start: 2019-09-12 | End: 2020-12-22 | Stop reason: SDUPTHER

## 2019-09-12 RX ORDER — ESCITALOPRAM OXALATE 20 MG/1
20 TABLET ORAL DAILY
Qty: 30 TABLET | Refills: 1 | Status: SHIPPED | OUTPATIENT
Start: 2019-09-12 | End: 2019-11-23 | Stop reason: SDUPTHER

## 2019-09-12 RX ORDER — METFORMIN HYDROCHLORIDE 500 MG/1
1000 TABLET, EXTENDED RELEASE ORAL 2 TIMES DAILY
Refills: 0 | COMMUNITY
Start: 2019-08-30 | End: 2021-01-22

## 2019-09-12 NOTE — PROGRESS NOTES
Assessment/Plan:    1  Anxiety and depression  -     escitalopram (LEXAPRO) 20 mg tablet; Take 1 tablet (20 mg total) by mouth daily  -     ALPRAZolam (XANAX) 0 25 mg tablet; Take 1 tablet (0 25 mg total) by mouth daily at bedtime as needed for anxiety    Will start mediations as above  Reviewed side effects and how to take medication  RTO in 1 month for med check  Total visit 30 minutes, >50% spent counseling    There are no Patient Instructions on file for this visit  Return in about 4 weeks (around 10/10/2019)  Subjective:      Patient ID: Josef Downs is a 38 y o  female  Chief Complaint   Patient presents with    Other     Medication check (anti depression, anxiety) vf/rma       Here today to discuss recent issues with anxiety and depression  She stopped taking her Lexapro approx 1 year ago, because she felt like she didn't need it  She 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, as well as at home  She has been eating poorly, has gained 5 pounds, and her diabetes is no long under control  She is very tearful and feels her emotions are very labile  She would like to be restarted on medication  The following portions of the patient's history were reviewed and updated as appropriate: allergies, current medications, past family history, past medical history, past social history, past surgical history and problem list     Review of Systems   Constitutional: Negative  Respiratory: Positive for chest tightness (secondary to anxiety)  Negative for cough and shortness of breath  Cardiovascular: Positive for chest pain (secondary to anxiety)  Psychiatric/Behavioral:        See HPI   All other systems reviewed and are negative          Current Outpatient Medications   Medication Sig Dispense Refill    ACCU-CHEK SOFTCLIX LANCETS lancets by Does not apply route      gabapentin (NEURONTIN) 800 mg tablet Take 1 tablet (800 mg total) by mouth 4 (four) times a day 120 tablet 2    Insulin Pen Needle (NOVOFINE) 32G X 6 MM MISC by Does not apply route      JANUVIA 100 MG tablet Take 100 mg by mouth daily   0    Liraglutide (VICTOZA) 18 MG/3ML SOPN Inject under the skin daily      metFORMIN (GLUCOPHAGE-XR) 500 mg 24 hr tablet Take 1,000 mg by mouth 2 (two) times a day  0    ONE TOUCH ULTRA TEST test strip TEST twice a day 100 each 1    ramipril (ALTACE) 2 5 mg capsule take 1 capsule by mouth once daily 90 capsule 0    repaglinide (PRANDIN) 2 mg tablet Take 2 mg by mouth 3 (three) times a day before meals       ALPRAZolam (XANAX) 0 25 mg tablet Take 1 tablet (0 25 mg total) by mouth daily at bedtime as needed for anxiety 30 tablet 0    escitalopram (LEXAPRO) 20 mg tablet Take 1 tablet (20 mg total) by mouth daily 30 tablet 1     No current facility-administered medications for this visit  Objective:    /75   Pulse 100   Temp 97 6 °F (36 4 °C)   Resp 16   Ht 5' 3 5" (1 613 m)   Wt 69 4 kg (153 lb)   LMP 09/05/2019 (Approximate) Comment: irregular pd  SpO2 98%   BMI 26 68 kg/m²        Physical Exam   Constitutional: She appears well-developed and well-nourished  HENT:   Head: Normocephalic and atraumatic  Right Ear: Tympanic membrane, external ear and ear canal normal    Left Ear: Tympanic membrane, external ear and ear canal normal    Nose: No mucosal edema or rhinorrhea  Mouth/Throat: Uvula is midline, oropharynx is clear and moist and mucous membranes are normal    Eyes: Conjunctivae are normal    Neck: Neck supple  No edema present  No thyromegaly present  Cardiovascular: Normal rate, regular rhythm, normal heart sounds and intact distal pulses  No murmur heard  Pulmonary/Chest: Effort normal and breath sounds normal    Abdominal: Bowel sounds are normal  She exhibits no distension  There is no splenomegaly or hepatomegaly  There is no tenderness  Lymphadenopathy:        Right cervical: No superficial cervical adenopathy present  Left cervical: No superficial cervical adenopathy present  Skin: Skin is warm, dry and intact  No rash noted  Psychiatric: She has a normal mood and affect  Nursing note and vitals reviewed               Larissa Grounds

## 2019-09-23 ENCOUNTER — OFFICE VISIT (OUTPATIENT)
Dept: FAMILY MEDICINE CLINIC | Facility: CLINIC | Age: 44
End: 2019-09-23
Payer: COMMERCIAL

## 2019-09-23 VITALS
SYSTOLIC BLOOD PRESSURE: 100 MMHG | BODY MASS INDEX: 25.61 KG/M2 | RESPIRATION RATE: 16 BRPM | DIASTOLIC BLOOD PRESSURE: 64 MMHG | TEMPERATURE: 96.8 F | HEIGHT: 64 IN | HEART RATE: 72 BPM | WEIGHT: 150 LBS

## 2019-09-23 DIAGNOSIS — E11.42 DIABETIC POLYNEUROPATHY ASSOCIATED WITH TYPE 2 DIABETES MELLITUS (HCC): ICD-10-CM

## 2019-09-23 DIAGNOSIS — H66.91 RIGHT OTITIS MEDIA, UNSPECIFIED OTITIS MEDIA TYPE: Primary | ICD-10-CM

## 2019-09-23 DIAGNOSIS — F41.9 ANXIETY AND DEPRESSION: ICD-10-CM

## 2019-09-23 DIAGNOSIS — F32.A ANXIETY AND DEPRESSION: ICD-10-CM

## 2019-09-23 DIAGNOSIS — F17.210 CIGARETTE NICOTINE DEPENDENCE WITHOUT COMPLICATION: ICD-10-CM

## 2019-09-23 PROCEDURE — 99214 OFFICE O/P EST MOD 30 MIN: CPT | Performed by: NURSE PRACTITIONER

## 2019-09-23 RX ORDER — AZITHROMYCIN 250 MG/1
TABLET, FILM COATED ORAL
Qty: 6 TABLET | Refills: 0 | Status: SHIPPED | OUTPATIENT
Start: 2019-09-23 | End: 2019-09-28

## 2019-09-23 NOTE — PROGRESS NOTES
Assessment/Plan:    1  Right otitis media, unspecified otitis media type  -     azithromycin (ZITHROMAX) 250 mg tablet; Take 500mg on day 1, 250mg on days 2-5    2  Diabetic polyneuropathy associated with type 2 diabetes mellitus ([address] )  Comments:  Managed by endocronologist    3  Cigarette nicotine dependence without complication  Comments:  Cessation discussed and advised    4  Anxiety and depression  Comments:  stable with current regimen          BMI Counseling: Body mass index is 26 15 kg/m²  Discussed the patient's BMI with her  The BMI is above normal  Nutrition recommendations include reducing portion sizes, decreasing overall calorie intake, 3-5 servings of fruits/vegetables daily, reducing fast food intake, consuming healthier snacks and decreasing soda and/or juice intake  Patient Instructions: Take medication with food  It is important that you take the entire course of antibiotics prescribed  May also take a probiotic of your choice to maintain healthy GI dariusz  Can take some probiotic and yogurt with the medication  Avoid Q-tips  Advised dry ear precautions  Increase fluid intake, saline nasal rinses, and hot tea with honey and lemon  Cool air humidification can be helpful as well  May take Ibuprofen or Tylenol as needed for pain or fevers  Mucinex D for sinus congestion or Coricidin HBP if you have high blood pressure or a heart condition  Mucinex or Robitussin DM are effective for cough and chest congestion  Supportive care discussed and advised  Advised to RTO for any worsening and no improvement  Follow up for no improvement and worsening of conditions  Patient advised and educated when to see immediate medical care  Avoid Q-tips  Advised dry ear precautions  Return if symptoms worsen or fail to improve        Future Appointments   Date Time Provider Beverly Hinojosa   10/11/2019  8:45 AM ASA Tse [facility]           Subjective:      Patient ID: Thien Chun is a 38 y o  female  Chief Complaint   Patient presents with    Cold Like Symptoms     lj    Fatigue    Generalized Body Aches         Vitals:  /64   Pulse 72   Temp (!) 96 8 °F (36 °C)   Resp 16   Ht 5' 3 5" (1 613 m)   Wt 68 kg (150 lb)   LMP 09/05/2019 (Approximate) Comment: irregular pd  BMI 26 15 kg/m²     HPI  Patient stated that started with cold like symptoms with fatigue, and cough from couple of days  Denies fever, chills and sob  Taking some OTC stuff for congestion  Smokes every day but trying to quit  Diabetes care by endo at [facility]  Complaint with medications  Stated that did not go to work on 2 days last week due to current symptoms  The following portions of the patient's history were reviewed and updated as appropriate: allergies, current medications, past family history, past medical history, past social history, past surgical history and problem list       Review of Systems   Constitutional: Positive for fatigue  Negative for chills, diaphoresis, fever and unexpected weight change  HENT: Negative for congestion, dental problem, drooling, ear discharge, ear pain, facial swelling, hearing loss, mouth sores, nosebleeds, postnasal drip, rhinorrhea, sinus pressure, sinus pain, sneezing, sore throat, tinnitus, trouble swallowing and voice change  Respiratory: Positive for cough  Negative for chest tightness, shortness of breath and wheezing  Cardiovascular: Negative  Gastrointestinal: Negative for abdominal pain, constipation, diarrhea, nausea and vomiting  Genitourinary: Negative  Musculoskeletal: Negative  Skin: Negative  Neurological: Positive for headaches  Negative for dizziness, facial asymmetry, weakness and light-headedness  Hematological: Negative  Psychiatric/Behavioral: Negative            Objective:    Social History     Tobacco Use   Smoking Status Current Every Day Smoker   Smokeless Tobacco Never Used       Allergies: No Known Allergies      Current Outpatient Medications   Medication Sig Dispense Refill    ACCU-CHEK SOFTCLIX LANCETS lancets by Does not apply route      ALPRAZolam (XANAX) 0 25 mg tablet Take 1 tablet (0 25 mg total) by mouth daily at bedtime as needed for anxiety 30 tablet 0    escitalopram (LEXAPRO) 20 mg tablet Take 1 tablet (20 mg total) by mouth daily 30 tablet 1    gabapentin (NEURONTIN) 800 mg tablet Take 1 tablet (800 mg total) by mouth 4 (four) times a day 120 tablet 2    Insulin Pen Needle (NOVOFINE) 32G X 6 MM MISC by Does not apply route      JANUVIA 100 MG tablet Take 100 mg by mouth daily   0    Liraglutide (VICTOZA) 18 MG/3ML SOPN Inject under the skin daily      metFORMIN (GLUCOPHAGE-XR) 500 mg 24 hr tablet Take 1,000 mg by mouth 2 (two) times a day  0    ONE TOUCH ULTRA TEST test strip TEST twice a day 100 each 1    ramipril (ALTACE) 2 5 mg capsule take 1 capsule by mouth once daily 90 capsule 0    repaglinide (PRANDIN) 2 mg tablet Take 2 mg by mouth 3 (three) times a day before meals       azithromycin (ZITHROMAX) 250 mg tablet Take 500mg on day 1, 250mg on days 2-5 6 tablet 0     No current facility-administered medications for this visit  Physical Exam   Constitutional: She is oriented to person, place, and time  She appears well-developed and well-nourished  HENT:   Head: Normocephalic  Right Ear: External ear and ear canal normal  There is tenderness  Tympanic membrane is injected and bulging  Left Ear: Tympanic membrane, external ear and ear canal normal    Nose: Nose normal  Right sinus exhibits no maxillary sinus tenderness and no frontal sinus tenderness  Left sinus exhibits no maxillary sinus tenderness and no frontal sinus tenderness  Mouth/Throat: Oropharynx is clear and moist and mucous membranes are normal    Neck: Neck supple  Cardiovascular: Normal rate, regular rhythm and normal heart sounds     Pulmonary/Chest: Effort normal and breath sounds normal    Abdominal: Normal appearance and bowel sounds are normal  There is no hepatosplenomegaly  There is no tenderness  There is no rebound  Musculoskeletal: Normal range of motion  Lymphadenopathy:        Right cervical: No superficial cervical and no posterior cervical adenopathy present  Left cervical: No superficial cervical and no posterior cervical adenopathy present  Neurological: She is alert and oriented to person, place, and time  Skin: Skin is warm and dry  Psychiatric: She has a normal mood and affect  Her behavior is normal  Judgment and thought content normal    Vitals reviewed                    ASA Najera

## 2019-09-23 NOTE — LETTER
September 23, 2019     Patient: Luisito Hartley   YOB: 1975   Date of Visit: 9/23/2019       To Whom it May Concern:    Tara Everett is under my professional care  She was seen in my office on 9/23/2019  She may return to work on 09/24/2019  Please excuse from work on 9/19, 9/20 and 9/23    If you have any questions or concerns, please don't hesitate to call           Sincerely,          ASA Armando        CC: No Recipients

## 2019-09-23 NOTE — PATIENT INSTRUCTIONS
Take medication with food  It is important that you take the entire course of antibiotics prescribed  May also take a probiotic of your choice to maintain healthy GI dariusz  Can take some probiotic and yogurt with the medication  Avoid Q-tips  Advised dry ear precautions  Increase fluid intake, saline nasal rinses, and hot tea with honey and lemon  Cool air humidification can be helpful as well  May take Ibuprofen or Tylenol as needed for pain or fevers  Mucinex D for sinus congestion or Coricidin HBP if you have high blood pressure or a heart condition  Mucinex or Robitussin DM are effective for cough and chest congestion  Supportive care discussed and advised  Advised to RTO for any worsening and no improvement  Follow up for no improvement and worsening of conditions  Patient advised and educated when to see immediate medical care  Avoid Q-tips  Advised dry ear precautions

## 2019-10-23 ENCOUNTER — OFFICE VISIT (OUTPATIENT)
Dept: FAMILY MEDICINE CLINIC | Facility: CLINIC | Age: 44
End: 2019-10-23
Payer: COMMERCIAL

## 2019-10-23 VITALS
SYSTOLIC BLOOD PRESSURE: 106 MMHG | TEMPERATURE: 98.2 F | BODY MASS INDEX: 27.11 KG/M2 | RESPIRATION RATE: 18 BRPM | DIASTOLIC BLOOD PRESSURE: 66 MMHG | HEART RATE: 88 BPM | WEIGHT: 153 LBS | HEIGHT: 63 IN

## 2019-10-23 DIAGNOSIS — N39.0 ACUTE UTI: Primary | ICD-10-CM

## 2019-10-23 LAB
SL AMB  POCT GLUCOSE, UA: ABNORMAL
SL AMB LEUKOCYTE ESTERASE,UA: ABNORMAL
SL AMB POCT BILIRUBIN,UA: ABNORMAL
SL AMB POCT BLOOD,UA: 250
SL AMB POCT CLARITY,UA: ABNORMAL
SL AMB POCT COLOR,UA: YELLOW
SL AMB POCT KETONES,UA: ABNORMAL
SL AMB POCT NITRITE,UA: ABNORMAL
SL AMB POCT PH,UA: 8
SL AMB POCT SPECIFIC GRAVITY,UA: 1
SL AMB POCT URINE PROTEIN: 30
SL AMB POCT UROBILINOGEN: ABNORMAL

## 2019-10-23 PROCEDURE — 99213 OFFICE O/P EST LOW 20 MIN: CPT | Performed by: FAMILY MEDICINE

## 2019-10-23 PROCEDURE — 81003 URINALYSIS AUTO W/O SCOPE: CPT | Performed by: FAMILY MEDICINE

## 2019-10-23 RX ORDER — NITROFURANTOIN 25; 75 MG/1; MG/1
100 CAPSULE ORAL 2 TIMES DAILY
Qty: 10 CAPSULE | Refills: 0 | Status: SHIPPED | OUTPATIENT
Start: 2019-10-23 | End: 2019-10-28 | Stop reason: ALTCHOICE

## 2019-10-23 NOTE — LETTER
October 23, 2019     Patient: Patricia Dave   YOB: 1975   Date of Visit: 10/23/2019       To Whom it May Concern:    Tati Alfred is under my professional care  She was seen in my office on 10/23/2019  She may return to work on 10/24/2019  If you have any questions or concerns, please don't hesitate to call           Sincerely,          Justen Cueto MD        CC: No Recipients

## 2019-10-23 NOTE — PROGRESS NOTES
Assessment/Plan:     Diagnoses and all orders for this visit:    Acute UTI  -     POCT urine dip auto non-scope done in office today positive for leukocytes and nitrites  -     Urine culture  -     nitrofurantoin (MACROBID) 100 mg capsule; Take 1 capsule (100 mg total) by mouth 2 (two) times a day for 5 days        Subjective:      Patient ID: Etta Islas is a 38 y o  female  Urinary Tract Infection    This is a new problem  Episode onset: 2 days  The problem occurs every urination  The problem has been gradually worsening  The quality of the pain is described as aching  The pain is mild  Associated symptoms include chills, hematuria and hesitancy  Pertinent negatives include no discharge, flank pain, frequency, nausea, possible pregnancy, sweats, urgency or vomiting  She has tried nothing for the symptoms  The following portions of the patient's history were reviewed and updated as appropriate: allergies, current medications, past family history, past medical history, past social history, past surgical history and problem list     Review of Systems   Constitutional: Positive for activity change and chills  Negative for appetite change, diaphoresis, fatigue and fever  HENT: Negative  Respiratory: Negative for cough, choking, chest tightness, shortness of breath and wheezing  Cardiovascular: Negative for chest pain, palpitations and leg swelling  Gastrointestinal: Negative for abdominal distention, abdominal pain, constipation, diarrhea, nausea and vomiting  Genitourinary: Positive for difficulty urinating, dysuria, hematuria and hesitancy  Negative for decreased urine volume, dyspareunia, enuresis, flank pain, frequency, menstrual problem, pelvic pain, urgency, vaginal bleeding, vaginal discharge and vaginal pain  Musculoskeletal: Negative for arthralgias, back pain, gait problem, joint swelling, myalgias, neck pain and neck stiffness  Skin: Negative      Neurological: Negative for dizziness, tremors, syncope, facial asymmetry, weakness, light-headedness, numbness and headaches  Psychiatric/Behavioral: Negative  Objective:      /66   Pulse 88   Temp 98 2 °F (36 8 °C)   Resp 18   Ht 5' 3" (1 6 m)   Wt 69 4 kg (153 lb)   BMI 27 10 kg/m²          Physical Exam   Constitutional: She is oriented to person, place, and time  She appears well-developed and well-nourished  No distress  HENT:   Head: Normocephalic and atraumatic  Cardiovascular: Normal rate, regular rhythm, normal heart sounds and intact distal pulses  Exam reveals no gallop and no friction rub  No murmur heard  Pulmonary/Chest: Effort normal and breath sounds normal  No respiratory distress  She has no wheezes  She has no rales  She exhibits no tenderness  Abdominal: Soft  Bowel sounds are normal  She exhibits no distension and no mass  There is no tenderness  There is no rebound and no guarding  Negative CVA tenderness bilaterally  Musculoskeletal: Normal range of motion  She exhibits no edema or deformity  Neurological: She is alert and oriented to person, place, and time  Skin: Skin is warm and dry  She is not diaphoretic

## 2019-10-26 ENCOUNTER — OFFICE VISIT (OUTPATIENT)
Dept: FAMILY MEDICINE CLINIC | Facility: CLINIC | Age: 44
End: 2019-10-26
Payer: COMMERCIAL

## 2019-10-26 VITALS
HEART RATE: 92 BPM | HEIGHT: 63 IN | BODY MASS INDEX: 27.43 KG/M2 | DIASTOLIC BLOOD PRESSURE: 62 MMHG | OXYGEN SATURATION: 98 % | RESPIRATION RATE: 18 BRPM | WEIGHT: 154.8 LBS | TEMPERATURE: 97.4 F | SYSTOLIC BLOOD PRESSURE: 114 MMHG

## 2019-10-26 DIAGNOSIS — J02.8 ACUTE BACTERIAL PHARYNGITIS: Primary | ICD-10-CM

## 2019-10-26 DIAGNOSIS — B96.89 ACUTE BACTERIAL PHARYNGITIS: Primary | ICD-10-CM

## 2019-10-26 LAB
BACTERIA UR CULT: ABNORMAL
Lab: ABNORMAL
SL AMB ANTIMICROBIAL SUSCEPTIBILITY: ABNORMAL

## 2019-10-26 PROCEDURE — 3008F BODY MASS INDEX DOCD: CPT | Performed by: FAMILY MEDICINE

## 2019-10-26 PROCEDURE — 99213 OFFICE O/P EST LOW 20 MIN: CPT | Performed by: FAMILY MEDICINE

## 2019-10-26 RX ORDER — AZITHROMYCIN 250 MG/1
TABLET, FILM COATED ORAL
Qty: 6 TABLET | Refills: 0 | Status: SHIPPED | OUTPATIENT
Start: 2019-10-26 | End: 2019-10-28 | Stop reason: ALTCHOICE

## 2019-10-26 NOTE — LETTER
October 26, 2019     Patient: Jermaine Rodriguez   YOB: 1975   Date of Visit: 10/26/2019       To Whom it May Concern:    Cindy Dewey is under my professional care  She was seen in my office on 10/26/2019  She may return to work on 10/28/19  If you have any questions or concerns, please don't hesitate to call           Sincerely,          Eduardo Light DO        CC: No Recipients

## 2019-10-26 NOTE — PROGRESS NOTES
Assessment/Plan:    1  Acute bacterial pharyngitis  -     azithromycin (ZITHROMAX) 250 mg tablet; 2 tabs on day 1, 1 tab a day for 4 days after          BMI Counseling: Body mass index is 27 42 kg/m²  Discussed the patient's BMI with her  The BMI is above normal  Nutrition recommendations include reducing portion sizes  There are no Patient Instructions on file for this visit  No follow-ups on file  Subjective:      Patient ID: Phillip Hutchinson is a 38 y o  female  Chief Complaint   Patient presents with    Sore Throat     this [facility]       Pt seen this Saturday  Pt states she has a sore thrioat and has had one for a number of days  States she feels trevon she has white gunk in her throat      The following portions of the patient's history were reviewed and updated as appropriate: allergies, current medications, past family history, past medical history, past social history, past surgical history and problem list     Review of Systems   Constitutional: Negative  Negative for activity change, appetite change, chills, diaphoresis and fatigue  HENT: Positive for sore throat  Negative for dental problem, ear pain and sinus pressure  Eyes: Negative  Negative for photophobia, pain, discharge, redness, itching and visual disturbance  Respiratory: Negative for apnea and chest tightness  Cardiovascular: Negative  Negative for chest pain, palpitations and leg swelling  Gastrointestinal: Negative  Negative for abdominal distention, abdominal pain, constipation and diarrhea  Endocrine: Negative  Negative for cold intolerance and heat intolerance  Genitourinary: Negative  Negative for difficulty urinating and dyspareunia  Musculoskeletal: Negative  Negative for arthralgias and back pain  Skin: Negative  Allergic/Immunologic: Negative for environmental allergies  Neurological: Negative  Negative for dizziness  Psychiatric/Behavioral: Negative    Negative for agitation  Current Outpatient Medications   Medication Sig Dispense Refill    ACCU-CHEK SOFTCLIX LANCETS lancets by Does not apply route      ALPRAZolam (XANAX) 0 25 mg tablet Take 1 tablet (0 25 mg total) by mouth daily at bedtime as needed for anxiety 30 tablet 0    escitalopram (LEXAPRO) 20 mg tablet Take 1 tablet (20 mg total) by mouth daily 30 tablet 1    gabapentin (NEURONTIN) 800 mg tablet Take 1 tablet (800 mg total) by mouth 4 (four) times a day 120 tablet 2    Insulin Pen Needle (NOVOFINE) 32G X 6 MM MISC by Does not apply route      JANUVIA 100 MG tablet Take 100 mg by mouth daily   0    Liraglutide (VICTOZA) 18 MG/3ML SOPN Inject under the skin daily      metFORMIN (GLUCOPHAGE-XR) 500 mg 24 hr tablet Take 1,000 mg by mouth 2 (two) times a day  0    nitrofurantoin (MACROBID) 100 mg capsule Take 1 capsule (100 mg total) by mouth 2 (two) times a day for 5 days 10 capsule 0    ONE TOUCH ULTRA TEST test strip TEST twice a day 100 each 1    ramipril (ALTACE) 2 5 mg capsule take 1 capsule by mouth once daily 90 capsule 0    repaglinide (PRANDIN) 2 mg tablet Take 2 mg by mouth 3 (three) times a day before meals       azithromycin (ZITHROMAX) 250 mg tablet 2 tabs on day 1, 1 tab a day for 4 days after 6 tablet 0     No current facility-administered medications for this visit  Objective:    /62   Pulse 92   Temp (!) 97 4 °F (36 3 °C)   Resp 18   Ht 5' 3" (1 6 m)   Wt 70 2 kg (154 lb 12 8 oz)   SpO2 98%   BMI 27 42 kg/m²        Physical Exam   Constitutional: She appears well-developed and well-nourished  No distress  HENT:   Head: Normocephalic and atraumatic  Right Ear: External ear normal    Left Ear: External ear normal    Nose: Nose normal    Mouth/Throat: Posterior oropharyngeal edema and posterior oropharyngeal erythema present  No oropharyngeal exudate  Eyes: Pupils are equal, round, and reactive to light  EOM are normal  Right eye exhibits no discharge   Left eye exhibits no discharge  No scleral icterus  Neck: No thyromegaly present  Cardiovascular: Normal rate and normal heart sounds  No murmur heard  Pulmonary/Chest: Effort normal and breath sounds normal  No respiratory distress  She has no wheezes  Abdominal: Soft  Bowel sounds are normal  She exhibits no distension and no mass  There is no tenderness  There is no rebound and no guarding  Musculoskeletal: Normal range of motion  Lymphadenopathy:     She has cervical adenopathy  Neurological: She is alert  She displays normal reflexes  Coordination normal    Skin: Skin is warm and dry  No rash noted  She is not diaphoretic  No erythema  Psychiatric: She has a normal mood and affect  Her behavior is normal    Nursing note and vitals reviewed               Emil Ocasio DO

## 2019-10-28 DIAGNOSIS — J02.8 ACUTE BACTERIAL PHARYNGITIS: ICD-10-CM

## 2019-10-28 DIAGNOSIS — B96.89 ACUTE BACTERIAL PHARYNGITIS: ICD-10-CM

## 2019-10-28 DIAGNOSIS — N39.0 ACUTE UTI: Primary | ICD-10-CM

## 2019-10-28 RX ORDER — AMOXICILLIN AND CLAVULANATE POTASSIUM 875; 125 MG/1; MG/1
1 TABLET, FILM COATED ORAL EVERY 12 HOURS SCHEDULED
Qty: 20 TABLET | Refills: 0 | Status: SHIPPED | OUTPATIENT
Start: 2019-10-28 | End: 2019-11-07

## 2019-11-07 ENCOUNTER — TELEPHONE (OUTPATIENT)
Dept: FAMILY MEDICINE CLINIC | Facility: CLINIC | Age: 44
End: 2019-11-07

## 2019-11-07 DIAGNOSIS — N76.0 ACUTE VAGINITIS: Primary | ICD-10-CM

## 2019-11-07 RX ORDER — FLUCONAZOLE 150 MG/1
150 TABLET ORAL ONCE
Qty: 1 TABLET | Refills: 0 | Status: SHIPPED | OUTPATIENT
Start: 2019-11-07 | End: 2019-11-07

## 2019-11-07 NOTE — TELEPHONE ENCOUNTER
[facility]     Yeast infection from antibiotics  Can we call a PILL in for her  She wants the pill nothing else      Thank you

## 2019-11-07 NOTE — TELEPHONE ENCOUNTER
Pt aware, states will take pill tonight and let us know how she is feeling in a couple of days       Ty KEVIN Quevedo

## 2019-11-23 DIAGNOSIS — F41.9 ANXIETY AND DEPRESSION: ICD-10-CM

## 2019-11-23 DIAGNOSIS — F32.A ANXIETY AND DEPRESSION: ICD-10-CM

## 2019-11-25 DIAGNOSIS — R20.2 PARESTHESIA: ICD-10-CM

## 2019-11-25 RX ORDER — GABAPENTIN 800 MG/1
800 TABLET ORAL 4 TIMES DAILY
Qty: 120 TABLET | Refills: 2 | Status: SHIPPED | OUTPATIENT
Start: 2019-11-25 | End: 2020-02-28

## 2019-11-25 RX ORDER — ESCITALOPRAM OXALATE 20 MG/1
TABLET ORAL
Qty: 30 TABLET | Refills: 3 | Status: SHIPPED | OUTPATIENT
Start: 2019-11-25 | End: 2020-12-22 | Stop reason: SDUPTHER

## 2019-11-29 DIAGNOSIS — E11.42 DIABETIC POLYNEUROPATHY ASSOCIATED WITH TYPE 2 DIABETES MELLITUS (HCC): ICD-10-CM

## 2019-12-01 PROCEDURE — 4010F ACE/ARB THERAPY RXD/TAKEN: CPT | Performed by: INTERNAL MEDICINE

## 2019-12-01 RX ORDER — RAMIPRIL 2.5 MG/1
CAPSULE ORAL
Qty: 90 CAPSULE | Refills: 0 | Status: SHIPPED | OUTPATIENT
Start: 2019-12-01 | End: 2020-03-12

## 2020-02-28 DIAGNOSIS — R20.2 PARESTHESIA: ICD-10-CM

## 2020-02-28 RX ORDER — GABAPENTIN 800 MG/1
TABLET ORAL
Qty: 120 TABLET | Refills: 2 | Status: SHIPPED | OUTPATIENT
Start: 2020-02-28 | End: 2020-02-28 | Stop reason: SDUPTHER

## 2020-02-28 RX ORDER — GABAPENTIN 800 MG/1
800 TABLET ORAL 4 TIMES DAILY
Qty: 30 TABLET | Refills: 0 | Status: SHIPPED | OUTPATIENT
Start: 2020-02-28 | End: 2020-06-18 | Stop reason: SDUPTHER

## 2020-03-11 DIAGNOSIS — E11.42 DIABETIC POLYNEUROPATHY ASSOCIATED WITH TYPE 2 DIABETES MELLITUS (HCC): ICD-10-CM

## 2020-03-12 PROCEDURE — 4010F ACE/ARB THERAPY RXD/TAKEN: CPT | Performed by: PHYSICAL MEDICINE & REHABILITATION

## 2020-03-12 RX ORDER — RAMIPRIL 2.5 MG/1
CAPSULE ORAL
Qty: 90 CAPSULE | Refills: 0 | Status: SHIPPED | OUTPATIENT
Start: 2020-03-12

## 2020-03-16 ENCOUNTER — TELEPHONE (OUTPATIENT)
Dept: FAMILY MEDICINE CLINIC | Facility: CLINIC | Age: 45
End: 2020-03-16

## 2020-03-16 NOTE — TELEPHONE ENCOUNTER
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   What are suggestions on what to do to stay healthy

## 2020-06-04 ENCOUNTER — TELEPHONE (OUTPATIENT)
Dept: FAMILY MEDICINE CLINIC | Facility: CLINIC | Age: 45
End: 2020-06-04

## 2020-06-18 ENCOUNTER — OFFICE VISIT (OUTPATIENT)
Dept: PODIATRY | Facility: CLINIC | Age: 45
End: 2020-06-18
Payer: COMMERCIAL

## 2020-06-18 VITALS — HEIGHT: 62 IN | WEIGHT: 162 LBS | BODY MASS INDEX: 29.81 KG/M2

## 2020-06-18 DIAGNOSIS — R20.2 PARESTHESIA OF LOWER LIMB: ICD-10-CM

## 2020-06-18 DIAGNOSIS — M72.2 PLANTAR FASCIITIS: Primary | ICD-10-CM

## 2020-06-18 DIAGNOSIS — G89.4 CHRONIC PAIN SYNDROME: ICD-10-CM

## 2020-06-18 DIAGNOSIS — E11.42 DIABETIC POLYNEUROPATHY ASSOCIATED WITH TYPE 2 DIABETES MELLITUS (HCC): ICD-10-CM

## 2020-06-18 DIAGNOSIS — E11.8 DIABETIC FOOT (HCC): ICD-10-CM

## 2020-06-18 DIAGNOSIS — R20.2 PARESTHESIA: ICD-10-CM

## 2020-06-18 PROCEDURE — 99213 OFFICE O/P EST LOW 20 MIN: CPT | Performed by: PODIATRIST

## 2020-06-18 RX ORDER — GABAPENTIN 800 MG/1
800 TABLET ORAL 4 TIMES DAILY
Qty: 30 TABLET | Refills: 0 | Status: SHIPPED | OUTPATIENT
Start: 2020-06-18 | End: 2020-06-29 | Stop reason: SDUPTHER

## 2020-06-18 RX ORDER — CAPSAICIN 0.07 G/100G
CREAM TOPICAL 3 TIMES DAILY
Qty: 28.3 G | Refills: 0 | Status: SHIPPED | OUTPATIENT
Start: 2020-06-18 | End: 2020-12-22

## 2020-06-18 RX ORDER — FLUCONAZOLE 100 MG/1
100 TABLET ORAL DAILY
COMMUNITY
Start: 2020-04-15 | End: 2021-01-22

## 2020-06-18 RX ORDER — ERGOCALCIFEROL (VITAMIN D2) 10 MCG
TABLET ORAL DAILY
COMMUNITY

## 2020-06-18 RX ORDER — ERGOCALCIFEROL 1.25 MG/1
50000 CAPSULE ORAL WEEKLY
COMMUNITY
Start: 2020-04-15

## 2020-06-29 DIAGNOSIS — R20.2 PARESTHESIA: ICD-10-CM

## 2020-06-29 RX ORDER — GABAPENTIN 800 MG/1
800 TABLET ORAL 4 TIMES DAILY
Qty: 60 TABLET | Refills: 0 | Status: SHIPPED | OUTPATIENT
Start: 2020-06-29 | End: 2020-07-07 | Stop reason: SDUPTHER

## 2020-07-06 LAB
CREAT ?TM UR-SCNC: 214.5 UMOL/L
EXT MICROALBUMIN URINE RANDOM: 27.5
HBA1C MFR BLD HPLC: 7.5 %
MICROALBUMIN/CREAT UR: 13 MG/G{CREAT}

## 2020-07-06 PROCEDURE — 3051F HG A1C>EQUAL 7.0%<8.0%: CPT | Performed by: PHYSICAL MEDICINE & REHABILITATION

## 2020-07-07 ENCOUNTER — CONSULT (OUTPATIENT)
Dept: PAIN MEDICINE | Facility: CLINIC | Age: 45
End: 2020-07-07
Payer: COMMERCIAL

## 2020-07-07 VITALS
BODY MASS INDEX: 29.26 KG/M2 | SYSTOLIC BLOOD PRESSURE: 138 MMHG | DIASTOLIC BLOOD PRESSURE: 83 MMHG | HEART RATE: 83 BPM | WEIGHT: 160 LBS | TEMPERATURE: 98.2 F

## 2020-07-07 DIAGNOSIS — M70.62 GREATER TROCHANTERIC BURSITIS OF BOTH HIPS: ICD-10-CM

## 2020-07-07 DIAGNOSIS — R20.2 PARESTHESIA: ICD-10-CM

## 2020-07-07 DIAGNOSIS — M70.61 GREATER TROCHANTERIC BURSITIS OF BOTH HIPS: ICD-10-CM

## 2020-07-07 DIAGNOSIS — M25.552 HIP PAIN, BILATERAL: Primary | ICD-10-CM

## 2020-07-07 DIAGNOSIS — M25.511 CHRONIC RIGHT SHOULDER PAIN: ICD-10-CM

## 2020-07-07 DIAGNOSIS — M25.551 HIP PAIN, BILATERAL: Primary | ICD-10-CM

## 2020-07-07 DIAGNOSIS — G89.29 CHRONIC RIGHT SHOULDER PAIN: ICD-10-CM

## 2020-07-07 PROCEDURE — 99244 OFF/OP CNSLTJ NEW/EST MOD 40: CPT | Performed by: PHYSICAL MEDICINE & REHABILITATION

## 2020-07-07 RX ORDER — GABAPENTIN 800 MG/1
800 TABLET ORAL 3 TIMES DAILY
Qty: 90 TABLET | Refills: 1 | Status: SHIPPED | OUTPATIENT
Start: 2020-07-07 | End: 2020-09-18

## 2020-07-07 NOTE — PROGRESS NOTES
Assessment  1  Hip pain, bilateral    2  Paresthesia    3  Greater trochanteric bursitis of both hips    4  Chronic right shoulder pain        Plan  Ms Tessie Mai is a pleasant 42-year-old female who presents for initial evaluation regarding paresthesias in bilateral feet, bilateral groin and right shoulder pain  During today's evaluation she is demonstrating her worse pain to be from the hips and the right shoulder  She is demonstrating clinical evidence of hip pains suspected to be coming from the greater trochanteric bursa bilaterally as well as suspected intra-articular hip pain  Her right shoulder she reports helping with her sons softball team and repetitive use of the right shoulder throwing patches which reaggravated the pain  She is demonstrating clinical evidence of acromioclavicular joint pain  We do not have any imaging and will start with x-rays to evaluate for bony pathology contributing to symptoms  In addition she continues to have diabetic peripheral neuropathy and most recent hemoglobin A1c was 8 1  She reports significant weight loss and is trying to get the blood sugars better under control  I will also continue her gabapentin 800 mg 3 times a day  I will call the patient personally with x-ray results and plan moving forward   She would likely benefit from hip injections but will wait x-ray results and discuss plan moving forward  All questions answered, patient is agreeable with plan    My impressions and treatment recommendations were discussed in detail with the patient who verbalized understanding and had no further questions  Discharge instructions were provided  I personally saw and examined the patient and I agree with the above discussed plan of care      Orders Placed This Encounter   Procedures    XR shoulder 2+ vw right     Standing Status:   Future     Standing Expiration Date:   7/7/2024     Scheduling Instructions:      Bring along any outside films relating to this procedure  Order Specific Question:   Is the patient pregnant? Answer:   No    XR hip/pelv 2-3 vws left if performed     Standing Status:   Future     Standing Expiration Date:   7/7/2024     Scheduling Instructions:      Bring along any outside films relating to this procedure  Order Specific Question:   Is the patient pregnant? Answer:   No    XR hip/pelv 2-3 vws right if performed     Standing Status:   Future     Standing Expiration Date:   7/7/2024     Scheduling Instructions:      Bring along any outside films relating to this procedure  Order Specific Question:   Is the patient pregnant? Answer:   No     New Medications Ordered This Visit   Medications    gabapentin (NEURONTIN) 800 mg tablet     Sig: Take 1 tablet (800 mg total) by mouth 3 (three) times a day     Dispense:  90 tablet     Refill:  1       History of Present Illness    Rosa Spivey is a 41 y o  female presents to [place] [address] and [facility] for initial evaluation regarding greater than 3 years duration of bilateral shoulder, low back, bilateral feet pain  Patient denies any significant inciting event or recent trauma  Today reports moderate pain rated 5 to 7/10 but not interfering with daily activities  Pain is nearly constant 60-95% of the time that is worse in the evening  Describes the pain as burning, shooting, numbness, sharp, pins and needles, throbbing, dull and aching  She does not use any durable medical equipment and she denies falls or weakness  Pain is worse with walking, exercise, relaxation  Previous nerve blocks have provided excellent relief in her pain as well as medication including gabapentin  Heat and ice and TENS unit have again provided moderate relief    She has been referred to Pain Management for further workup and evaluation by [employer] [place] [address] [place]      I have personally reviewed and/or updated the patient's past medical history, past surgical history, family history, social history, current medications, allergies, and vital signs today  Review of Systems   Constitutional: Negative for fever and unexpected weight change  HENT: Negative for trouble swallowing  Eyes: Negative for visual disturbance  Respiratory: Negative for shortness of breath and wheezing  Cardiovascular: Negative for chest pain and palpitations  Gastrointestinal: Negative for constipation, diarrhea, nausea and vomiting  Endocrine: Negative for cold intolerance, heat intolerance and polydipsia  Genitourinary: Negative for difficulty urinating and frequency  Musculoskeletal: Negative for arthralgias, gait problem, joint swelling and myalgias  Skin: Negative for rash  Neurological: Negative for dizziness, seizures, syncope, weakness and headaches  Hematological: Does not bruise/bleed easily  Psychiatric/Behavioral: Negative for dysphoric mood  All other systems reviewed and are negative  Patient Active Problem List   Diagnosis    Adverse reaction to statin medication    Allergic asthma, mild intermittent, uncomplicated    Anxiety and depression    [facility]    Diabetes mellitus with polyneuropathy (HCC)    Dysesthesia    Fatigue    Left breast mass    Low back pain    Nicotine dependence    Overweight (BMI 25 0-29  9)       Past Medical History:   Diagnosis Date    Acute gastritis     [id number] RESOLVED    Breast pain     [id number] RESOLVED    Diabetes [facility]     MELLITUS    Viral gastroenteritis     [id number] RESOLVED       Past Surgical History:   Procedure Laterality Date    HAND SURGERY      SKIN BIOPSY         Family History   Problem Relation Age of Onset    Hypertension Mother     Breast cancer Family     Colon cancer Family     Diabetes Family         MELLITUS    Lung cancer Family     Mental illness Neg Hx     Substance Abuse Neg Hx        Social History Occupational History    Not on file   Tobacco Use    Smoking status: Current Every Day Smoker    Smokeless tobacco: Never Used   Substance and Sexual Activity    Alcohol use: Yes     Comment: 2-3 drinks     Drug use: No    Sexual activity: Not on file       Current Outpatient Medications on File Prior to Visit   Medication Sig    ALPRAZolam (XANAX) 0 25 mg tablet Take 1 tablet (0 25 mg total) by mouth daily at bedtime as needed for anxiety    capsicum (ZOSTRIX) 0 075 % topical cream Apply topically 3 (three) times a day    ergocalciferol (VITAMIN D2) 50,000 units take 1 capsule by mouth TWO TIMES PER WEEK    escitalopram (LEXAPRO) 20 mg tablet take 1 tablet by mouth once daily    fluconazole (DIFLUCAN) 100 mg tablet Take 100 mg by mouth daily    metFORMIN (GLUCOPHAGE-XR) 500 mg 24 hr tablet Take 1,000 mg by mouth 2 (two) times a day    Multiple Vitamin (DAILY VALUE MULTIVITAMIN) TABS Take by mouth    ramipril (ALTACE) 2 5 mg capsule take 1 capsule by mouth once daily    repaglinide (PRANDIN) 2 mg tablet Take 2 mg by mouth 3 (three) times a day before meals     [DISCONTINUED] gabapentin (NEURONTIN) 800 mg tablet Take 1 tablet (800 mg total) by mouth 4 (four) times a day    [DISCONTINUED] ACCU-CHEK SOFTCLIX LANCETS lancets by Does not apply route    [DISCONTINUED] Insulin Pen Needle (NOVOFINE) 32G X 6 MM MISC by Does not apply route    [DISCONTINUED] JANUVIA 100 MG tablet Take 100 mg by mouth daily     [DISCONTINUED] Liraglutide (VICTOZA) 18 MG/3ML SOPN Inject under the skin daily    [DISCONTINUED] ONE TOUCH ULTRA TEST test strip TEST twice a day     No current facility-administered medications on file prior to visit  No Known Allergies    Physical Exam    /83   Pulse 83   Temp 98 2 °F (36 8 °C) (Temporal)   Wt 72 6 kg (160 lb)   BMI 29 26 kg/m²     General: Well-developed, well-nourished individual in no acute distress  Mental: Appropriate mood and affect   Grossly oriented with coherent speech and thought processing  Neuro:  Cranial nerves: Cranial nerve function is grossly intact bilaterally  Strength: Bilateral lower extremity strength is normal and symmetric  No atrophy or tone abnormalities noted  Reflexes: Bilateral lower extremity muscle stretch reflexes are physiologic and symmetric  No ankle clonus is noted  Sensation: No loss of sensation is noted  SLR/Foraminal Compression Maneuvers: Straight leg raising in the   supine position is  negative for radicular pain  Gait:  Gait/gross motor: Gait is normal  Station is normal     Musculoskeletal:  Palpation: Inspection and palpation of the spine and extremities are remarkable for tenderness to palpation bilateral trochanters and anteromedial leg and groin  Also tenderness to palpation right anterolateral shoulder    POSITIVE ANNA bilaterally with pain palpated bilateral groin    Spine:  Decreased active and passive range of motion right shoulder abduction limited by pain  No gross axial skeletal deformities  Right shoulder provocative maneuvers  Positive for scarf test  Negative [place]'s, Yergason's, speed, empty can, Neer's and Gonzales    Skin: Skin inspection grossly negative for erythema, breakdown, or concerning lesions in affected area  Lymph: No lymphadenopathy is appreciated in the involved extremity  Vessels: No lower extremity edema  Lungs: Breathing is comfortable and regular  No dyspnea noted during examination  Eyes: Visual field grossly intact to confrontation  No redness appreciated  ENT: No craniofacial deformities or asymmetry  No neck masses appreciated          Imaging

## 2020-07-07 NOTE — PATIENT INSTRUCTIONS
Hip Bursitis   WHAT YOU NEED TO KNOW:   Hip bursitis is inflammation of the bursa in your hip  The bursa is a fluid-filled sac that acts as a cushion between a bone and a tendon  A tendon is a cord of strong tissue that connects muscles to bones  DISCHARGE INSTRUCTIONS:   Medicines:   · NSAIDs:  These medicines decrease swelling, pain, and fever  NSAIDs are available without a doctor's order  Ask your healthcare provider which medicine is right for you  Ask how much to take and when to take it  Take as directed  NSAIDs can cause stomach bleeding and kidney problems if not taken correctly  · Antibiotics: These help fight an infection caused by bacteria  You may need antibiotics if your bursitis is caused by infection  · Take your medicine as directed  Contact your healthcare provider if you think your medicine is not helping or if you have side effects  Tell him of her if you are allergic to any medicine  Keep a list of the medicines, vitamins, and herbs you take  Include the amounts, and when and why you take them  Bring the list or the pill bottles to follow-up visits  Carry your medicine list with you in case of an emergency  Manage your symptoms:   · Rest:  Rest your hip as much as possible to decrease pain and swelling  Slowly start to do more each day  Return to your daily activities as directed  · Ice:  Ice helps decrease swelling and pain  Ice may also help prevent tissue damage  Use an ice pack, or put crushed ice in a plastic bag  Cover it with a towel and place it on your hip for 15 to 20 minutes, 3 to 4 times each day, as directed  · Sleep position:  Do not lie on your injured hip  You may be more comfortable if you sleep on your stomach or back  · Physical therapy:  A physical therapist teaches you exercises to help improve movement and strength, and to decrease pain    Prevent another hip injury:   · Stretch, warm up, and cool down:  Always stretch and do warmup and cool-down exercises before and after you exercise  This will help loosen your muscles and decrease stress on your hip  Rest between workouts  · Wear proper shoes:  Wear shoes that fit properly and support your feet  You may need to wear shoe inserts called orthotics  Orthotics help position your foot correctly as you walk or exercise  · Maintain a healthy weight:  Ask your healthcare provider how much you should weigh  Ask him to help you create a weight loss plan if you are overweight  · Keep pressure off your hips:  Do not stand or sit for long periods of time  Sit on padded surfaces, such as a cushion or pad, whenever possible  Bend your knees when you  objects from the ground  Follow up with your healthcare provider as directed:  Write down your questions so you remember to ask them during your visits  Contact your healthcare provider if:   · Your pain and swelling increase  · Your symptoms do not improve with treatment  · You have a fever  · You have questions or concerns about your condition or care  © 2017 [facility] Information is for End User's use only and may not be sold, redistributed or otherwise used for commercial purposes  All illustrations and images included in CareNotes® are the copyrighted property of [employer] , [employer]  or Vasile Montilla  The above information is an  only  It is not intended as medical advice for individual conditions or treatments  Talk to your doctor, nurse or pharmacist before following any medical regimen to see if it is safe and effective for you

## 2020-07-10 ENCOUNTER — HOSPITAL ENCOUNTER (OUTPATIENT)
Dept: RADIOLOGY | Facility: HOSPITAL | Age: 45
Discharge: HOME | End: 2020-07-10
Attending: PHYSICAL MEDICINE & REHABILITATION

## 2020-07-10 ENCOUNTER — TRANSCRIBE ORDERS (OUTPATIENT)
Dept: ADMINISTRATIVE | Facility: HOSPITAL | Age: 45
End: 2020-07-10

## 2020-07-10 DIAGNOSIS — G89.29 CHRONIC RIGHT SHOULDER PAIN: ICD-10-CM

## 2020-07-10 DIAGNOSIS — R20.2 PARESTHESIA: ICD-10-CM

## 2020-07-10 DIAGNOSIS — M25.552 HIP PAIN, BILATERAL: ICD-10-CM

## 2020-07-10 DIAGNOSIS — M25.551 HIP PAIN, BILATERAL: ICD-10-CM

## 2020-07-10 DIAGNOSIS — M70.61 GREATER TROCHANTERIC BURSITIS OF BOTH HIPS: ICD-10-CM

## 2020-07-10 DIAGNOSIS — M70.62 GREATER TROCHANTERIC BURSITIS OF BOTH HIPS: ICD-10-CM

## 2020-07-10 DIAGNOSIS — M25.511 CHRONIC RIGHT SHOULDER PAIN: ICD-10-CM

## 2020-07-10 DIAGNOSIS — M25.511 RIGHT SHOULDER PAIN, UNSPECIFIED CHRONICITY: Primary | ICD-10-CM

## 2020-07-10 DIAGNOSIS — M25.552 LEFT HIP PAIN: ICD-10-CM

## 2020-07-11 ENCOUNTER — HOSPITAL ENCOUNTER (OUTPATIENT)
Dept: RADIOLOGY | Facility: HOSPITAL | Age: 45
Discharge: HOME | End: 2020-07-11
Attending: PHYSICAL MEDICINE & REHABILITATION
Payer: COMMERCIAL

## 2020-07-11 DIAGNOSIS — M25.552 LEFT HIP PAIN: ICD-10-CM

## 2020-07-11 DIAGNOSIS — M25.511 RIGHT SHOULDER PAIN, UNSPECIFIED CHRONICITY: ICD-10-CM

## 2020-07-11 DIAGNOSIS — R20.2 PARESTHESIA: ICD-10-CM

## 2020-07-11 PROCEDURE — 73030 X-RAY EXAM OF SHOULDER: CPT

## 2020-07-11 PROCEDURE — 73502 X-RAY EXAM HIP UNI 2-3 VIEWS: CPT

## 2020-07-14 ENCOUNTER — TELEPHONE (OUTPATIENT)
Dept: PAIN MEDICINE | Facility: CLINIC | Age: 45
End: 2020-07-14

## 2020-07-14 NOTE — TELEPHONE ENCOUNTER
Pt would like to schedule her procedure on her rt shoulder asap before pt is going on vacation on sat 7/18     Pt c/b [phone] (

## 2020-07-14 NOTE — TELEPHONE ENCOUNTER
Hi Pat,     Can we schedule patient for tomorrow for right [facility] joint injection under ultrasound guidance?   I understand if this has to double book me that is fine    Thank you

## 2020-07-14 NOTE — TELEPHONE ENCOUNTER
RN s/w pt  Her Shoulder is bothering her the most and would like to have an injection prior to leaving for [facility] for her vacation on 7/18  Pt verbalized she understands if it is not possible but thought she would try due to having to quarantine 2 weeks after she returns  Pt's xray results are final  Pt is an Meg Wilks pt but is willing to travel if necessary        Please advise-

## 2020-07-15 ENCOUNTER — PROCEDURE VISIT (OUTPATIENT)
Dept: PAIN MEDICINE | Facility: CLINIC | Age: 45
End: 2020-07-15
Payer: COMMERCIAL

## 2020-07-15 VITALS
WEIGHT: 154 LBS | TEMPERATURE: 98.2 F | DIASTOLIC BLOOD PRESSURE: 75 MMHG | HEART RATE: 84 BPM | SYSTOLIC BLOOD PRESSURE: 111 MMHG | BODY MASS INDEX: 28.17 KG/M2

## 2020-07-15 DIAGNOSIS — M19.019 AC JOINT ARTHROPATHY: Primary | ICD-10-CM

## 2020-07-15 PROCEDURE — 20611 DRAIN/INJ JOINT/BURSA W/US: CPT | Performed by: PHYSICAL MEDICINE & REHABILITATION

## 2020-07-15 RX ORDER — BUPIVACAINE HYDROCHLORIDE 2.5 MG/ML
10 INJECTION, SOLUTION EPIDURAL; INFILTRATION; INTRACAUDAL ONCE
Status: COMPLETED | OUTPATIENT
Start: 2020-07-15 | End: 2020-07-15

## 2020-07-15 RX ORDER — METHYLPREDNISOLONE ACETATE 40 MG/ML
40 INJECTION, SUSPENSION INTRA-ARTICULAR; INTRALESIONAL; INTRAMUSCULAR; SOFT TISSUE ONCE
Status: COMPLETED | OUTPATIENT
Start: 2020-07-15 | End: 2020-07-15

## 2020-07-15 RX ADMIN — METHYLPREDNISOLONE ACETATE 40 MG: 40 INJECTION, SUSPENSION INTRA-ARTICULAR; INTRALESIONAL; INTRAMUSCULAR; SOFT TISSUE at 15:36

## 2020-07-15 RX ADMIN — BUPIVACAINE HYDROCHLORIDE 10 ML: 2.5 INJECTION, SOLUTION EPIDURAL; INFILTRATION; INTRACAUDAL at 15:36

## 2020-07-15 NOTE — PROGRESS NOTES
Indication: Shoulder pain  Preprocedure diagnosis: Acromioclavicular joint arthritis  Postprocedure diagnosis: Acromioclavicular joint arthritis    Procedure: Ultrasound-guided right acromioclavicular joint injection    After discussing the risks, benefits, and alternatives to the procedure, the patient expressed understanding and wished to proceed  The patient was brought to the procedure suite and placed in the seated position  A procedural pause was conducted to verify: correct patient identity, procedure to be performed and as applicable, correct side and site, correct patient position, and availability of implants, special equipment or special requirements  A simple surgical tray was used  Prior to the procedure, the left shoulder was examined with a 12 MHz linear transducer to visualize the left acromioclavicular joint and determine the optimal needle path  Following this, the left shoulder was prepared with a ChloraPrep scrub, then re-examined using the same transducer, a sterile ultrasound transducer cover, and sterile ultrasound transducer gel  Thereafter, using continuous ultrasound guidance, a 2 5 inch 25 gauge needle was advanced into the acromioclavicular joint  After visualization of the tip in the target area and negative aspiration for blood, a mixture of 20 mg Depo-Medrol in 1 cc of 0 25% bupivacaine was injected into the acromioclavicular joint  Following the injection, the needle was withdrawn  The patient tolerated the procedure well and there were no apparent complications  After an appropriate amount of observation, the patient was dismissed from the clinic in good condition under their own power

## 2020-08-24 ENCOUNTER — TELEPHONE (OUTPATIENT)
Dept: FAMILY MEDICINE CLINIC | Facility: CLINIC | Age: 45
End: 2020-08-24

## 2020-09-18 DIAGNOSIS — R20.2 PARESTHESIA: ICD-10-CM

## 2020-09-18 RX ORDER — GABAPENTIN 800 MG/1
TABLET ORAL
Qty: 90 TABLET | Refills: 1 | Status: SHIPPED | OUTPATIENT
Start: 2020-09-18 | End: 2020-11-27

## 2020-10-16 ENCOUNTER — TELEPHONE (OUTPATIENT)
Dept: ADMINISTRATIVE | Facility: OTHER | Age: 45
End: 2020-10-16

## 2020-11-04 ENCOUNTER — VBI (OUTPATIENT)
Dept: ADMINISTRATIVE | Facility: OTHER | Age: 45
End: 2020-11-04

## 2020-11-05 ENCOUNTER — OFFICE VISIT (OUTPATIENT)
Dept: PAIN MEDICINE | Facility: CLINIC | Age: 45
End: 2020-11-05
Payer: COMMERCIAL

## 2020-11-05 VITALS
WEIGHT: 160 LBS | TEMPERATURE: 98.2 F | BODY MASS INDEX: 29.26 KG/M2 | DIASTOLIC BLOOD PRESSURE: 86 MMHG | SYSTOLIC BLOOD PRESSURE: 128 MMHG | HEART RATE: 92 BPM

## 2020-11-05 DIAGNOSIS — M16.12 OSTEOARTHRITIS OF LEFT HIP, UNSPECIFIED OSTEOARTHRITIS TYPE: Primary | ICD-10-CM

## 2020-11-05 DIAGNOSIS — M70.62 GREATER TROCHANTERIC BURSITIS OF LEFT HIP: ICD-10-CM

## 2020-11-05 PROCEDURE — 99214 OFFICE O/P EST MOD 30 MIN: CPT | Performed by: PHYSICAL MEDICINE & REHABILITATION

## 2020-11-06 ENCOUNTER — TELEMEDICINE (OUTPATIENT)
Dept: FAMILY MEDICINE CLINIC | Facility: CLINIC | Age: 45
End: 2020-11-06
Payer: COMMERCIAL

## 2020-11-06 ENCOUNTER — TRANSCRIBE ORDERS (OUTPATIENT)
Dept: PAIN MEDICINE | Facility: CLINIC | Age: 45
End: 2020-11-06

## 2020-11-06 ENCOUNTER — NURSE TRIAGE (OUTPATIENT)
Dept: OTHER | Facility: OTHER | Age: 45
End: 2020-11-06

## 2020-11-06 DIAGNOSIS — J30.9 ALLERGIC RHINITIS, UNSPECIFIED SEASONALITY, UNSPECIFIED TRIGGER: Primary | ICD-10-CM

## 2020-11-06 PROBLEM — F41.9 ANXIETY: Status: ACTIVE | Noted: 2020-11-06

## 2020-11-06 PROBLEM — B35.1 ONYCHOMYCOSIS: Status: ACTIVE | Noted: 2020-11-06

## 2020-11-06 PROBLEM — M72.2 PLANTAR FASCIITIS: Status: ACTIVE | Noted: 2020-11-06

## 2020-11-06 PROCEDURE — 4004F PT TOBACCO SCREEN RCVD TLK: CPT | Performed by: NURSE PRACTITIONER

## 2020-11-06 PROCEDURE — 99213 OFFICE O/P EST LOW 20 MIN: CPT | Performed by: NURSE PRACTITIONER

## 2020-11-06 RX ORDER — ROSUVASTATIN CALCIUM 5 MG/1
5 TABLET, COATED ORAL DAILY
COMMUNITY

## 2020-11-06 RX ORDER — LINAGLIPTIN 5 MG/1
5 TABLET, FILM COATED ORAL DAILY
COMMUNITY

## 2020-11-27 DIAGNOSIS — R20.2 PARESTHESIA: ICD-10-CM

## 2020-11-27 RX ORDER — GABAPENTIN 800 MG/1
TABLET ORAL
Qty: 90 TABLET | Refills: 1 | Status: SHIPPED | OUTPATIENT
Start: 2020-11-27 | End: 2021-01-20

## 2020-12-07 ENCOUNTER — TELEPHONE (OUTPATIENT)
Dept: PAIN MEDICINE | Facility: CLINIC | Age: 45
End: 2020-12-07

## 2020-12-09 ENCOUNTER — TELEMEDICINE (OUTPATIENT)
Dept: FAMILY MEDICINE CLINIC | Facility: CLINIC | Age: 45
End: 2020-12-09
Payer: COMMERCIAL

## 2020-12-09 DIAGNOSIS — Z03.818 ENCOUNTER FOR OBSERVATION FOR SUSPECTED EXPOSURE TO OTHER BIOLOGICAL AGENTS RULED OUT: Primary | ICD-10-CM

## 2020-12-09 PROCEDURE — 99213 OFFICE O/P EST LOW 20 MIN: CPT | Performed by: FAMILY MEDICINE

## 2020-12-10 DIAGNOSIS — Z03.818 ENCOUNTER FOR OBSERVATION FOR SUSPECTED EXPOSURE TO OTHER BIOLOGICAL AGENTS RULED OUT: ICD-10-CM

## 2020-12-10 PROCEDURE — U0003 INFECTIOUS AGENT DETECTION BY NUCLEIC ACID (DNA OR RNA); SEVERE ACUTE RESPIRATORY SYNDROME CORONAVIRUS 2 (SARS-COV-2) (CORONAVIRUS DISEASE [COVID-19]), AMPLIFIED PROBE TECHNIQUE, MAKING USE OF HIGH THROUGHPUT TECHNOLOGIES AS DESCRIBED BY CMS-2020-01-R: HCPCS | Performed by: FAMILY MEDICINE

## 2020-12-11 LAB — SARS-COV-2 RNA SPEC QL NAA+PROBE: NOT DETECTED

## 2020-12-15 ENCOUNTER — OFFICE VISIT (OUTPATIENT)
Dept: FAMILY MEDICINE CLINIC | Facility: CLINIC | Age: 45
End: 2020-12-15
Payer: COMMERCIAL

## 2020-12-15 VITALS
HEART RATE: 99 BPM | HEIGHT: 62 IN | WEIGHT: 162 LBS | DIASTOLIC BLOOD PRESSURE: 80 MMHG | TEMPERATURE: 97.5 F | RESPIRATION RATE: 16 BRPM | OXYGEN SATURATION: 98 % | BODY MASS INDEX: 29.81 KG/M2 | SYSTOLIC BLOOD PRESSURE: 120 MMHG

## 2020-12-15 DIAGNOSIS — M54.2 TENDERNESS OF NECK: Primary | ICD-10-CM

## 2020-12-15 DIAGNOSIS — F41.9 ANXIETY: ICD-10-CM

## 2020-12-15 DIAGNOSIS — F17.210 CIGARETTE NICOTINE DEPENDENCE WITHOUT COMPLICATION: ICD-10-CM

## 2020-12-15 DIAGNOSIS — IMO0002 DIABETES MELLITUS WITH NEUROLOGICAL MANIFESTATIONS, UNCONTROLLED: ICD-10-CM

## 2020-12-15 PROCEDURE — 99214 OFFICE O/P EST MOD 30 MIN: CPT | Performed by: NURSE PRACTITIONER

## 2020-12-15 PROCEDURE — 3725F SCREEN DEPRESSION PERFORMED: CPT | Performed by: NURSE PRACTITIONER

## 2020-12-15 PROCEDURE — 3008F BODY MASS INDEX DOCD: CPT | Performed by: NURSE PRACTITIONER

## 2020-12-15 PROCEDURE — 4004F PT TOBACCO SCREEN RCVD TLK: CPT | Performed by: NURSE PRACTITIONER

## 2020-12-15 RX ORDER — AMOXICILLIN AND CLAVULANATE POTASSIUM 875; 125 MG/1; MG/1
1 TABLET, FILM COATED ORAL EVERY 12 HOURS SCHEDULED
Qty: 20 TABLET | Refills: 0 | Status: SHIPPED | OUTPATIENT
Start: 2020-12-15 | End: 2020-12-25

## 2020-12-22 ENCOUNTER — OFFICE VISIT (OUTPATIENT)
Dept: FAMILY MEDICINE CLINIC | Facility: CLINIC | Age: 45
End: 2020-12-22
Payer: COMMERCIAL

## 2020-12-22 VITALS
HEIGHT: 62 IN | DIASTOLIC BLOOD PRESSURE: 78 MMHG | RESPIRATION RATE: 16 BRPM | BODY MASS INDEX: 29.44 KG/M2 | WEIGHT: 160 LBS | SYSTOLIC BLOOD PRESSURE: 114 MMHG | TEMPERATURE: 96.7 F | HEART RATE: 84 BPM

## 2020-12-22 DIAGNOSIS — R59.0 LYMPHADENOPATHY, CERVICAL: ICD-10-CM

## 2020-12-22 DIAGNOSIS — F41.9 ANXIETY AND DEPRESSION: ICD-10-CM

## 2020-12-22 DIAGNOSIS — F32.A ANXIETY AND DEPRESSION: ICD-10-CM

## 2020-12-22 DIAGNOSIS — IMO0002 DIABETES MELLITUS WITH NEUROLOGICAL MANIFESTATIONS, UNCONTROLLED: ICD-10-CM

## 2020-12-22 DIAGNOSIS — Z23 NEED FOR VACCINATION: ICD-10-CM

## 2020-12-22 DIAGNOSIS — N64.4 BREAST PAIN, RIGHT: Primary | ICD-10-CM

## 2020-12-22 PROCEDURE — 90686 IIV4 VACC NO PRSV 0.5 ML IM: CPT

## 2020-12-22 PROCEDURE — 4004F PT TOBACCO SCREEN RCVD TLK: CPT | Performed by: NURSE PRACTITIONER

## 2020-12-22 PROCEDURE — 3008F BODY MASS INDEX DOCD: CPT | Performed by: NURSE PRACTITIONER

## 2020-12-22 PROCEDURE — 90471 IMMUNIZATION ADMIN: CPT

## 2020-12-22 PROCEDURE — 92250 FUNDUS PHOTOGRAPHY W/I&R: CPT | Performed by: NURSE PRACTITIONER

## 2020-12-22 PROCEDURE — 99214 OFFICE O/P EST MOD 30 MIN: CPT | Performed by: NURSE PRACTITIONER

## 2020-12-22 RX ORDER — ESCITALOPRAM OXALATE 10 MG/1
10 TABLET ORAL DAILY
Qty: 90 TABLET | Refills: 1 | Status: SHIPPED | OUTPATIENT
Start: 2020-12-22 | End: 2021-07-23

## 2020-12-22 RX ORDER — ALPRAZOLAM 0.25 MG/1
0.25 TABLET ORAL
Qty: 30 TABLET | Refills: 0 | Status: SHIPPED | OUTPATIENT
Start: 2020-12-22 | End: 2022-05-23

## 2020-12-23 ENCOUNTER — TELEPHONE (OUTPATIENT)
Dept: FAMILY MEDICINE CLINIC | Facility: CLINIC | Age: 45
End: 2020-12-23

## 2020-12-23 PROCEDURE — 2025F 7 FLD RTA PHOTO W/O RTNOPTHY: CPT | Performed by: NURSE PRACTITIONER

## 2020-12-30 ENCOUNTER — TELEMEDICINE (OUTPATIENT)
Dept: FAMILY MEDICINE CLINIC | Facility: CLINIC | Age: 45
End: 2020-12-30
Payer: COMMERCIAL

## 2020-12-30 DIAGNOSIS — R11.0 NAUSEA: ICD-10-CM

## 2020-12-30 DIAGNOSIS — R68.83 CHILLS: ICD-10-CM

## 2020-12-30 DIAGNOSIS — R68.83 CHILLS: Primary | ICD-10-CM

## 2020-12-30 PROCEDURE — 99213 OFFICE O/P EST LOW 20 MIN: CPT | Performed by: NURSE PRACTITIONER

## 2020-12-30 PROCEDURE — U0003 INFECTIOUS AGENT DETECTION BY NUCLEIC ACID (DNA OR RNA); SEVERE ACUTE RESPIRATORY SYNDROME CORONAVIRUS 2 (SARS-COV-2) (CORONAVIRUS DISEASE [COVID-19]), AMPLIFIED PROBE TECHNIQUE, MAKING USE OF HIGH THROUGHPUT TECHNOLOGIES AS DESCRIBED BY CMS-2020-01-R: HCPCS | Performed by: NURSE PRACTITIONER

## 2020-12-30 RX ORDER — DAPAGLIFLOZIN AND METFORMIN HYDROCHLORIDE 2.5; 1 MG/1; MG/1
1 TABLET, FILM COATED, EXTENDED RELEASE ORAL 2 TIMES DAILY
COMMUNITY
Start: 2020-12-17 | End: 2021-01-22

## 2021-01-01 LAB — SARS-COV-2 RNA SPEC QL NAA+PROBE: NOT DETECTED

## 2021-01-07 ENCOUNTER — HOSPITAL ENCOUNTER (OUTPATIENT)
Dept: RADIOLOGY | Facility: HOSPITAL | Age: 46
Discharge: HOME | End: 2021-01-07
Payer: COMMERCIAL

## 2021-01-07 DIAGNOSIS — R59.0 LYMPHADENOPATHY, CERVICAL: ICD-10-CM

## 2021-01-07 PROCEDURE — 76536 US EXAM OF HEAD AND NECK: CPT

## 2021-01-20 DIAGNOSIS — R20.2 PARESTHESIA: ICD-10-CM

## 2021-01-20 RX ORDER — GABAPENTIN 800 MG/1
TABLET ORAL
Qty: 90 TABLET | Refills: 1 | Status: SHIPPED | OUTPATIENT
Start: 2021-01-20 | End: 2021-03-26

## 2021-01-22 ENCOUNTER — OFFICE VISIT (OUTPATIENT)
Dept: FAMILY MEDICINE CLINIC | Facility: CLINIC | Age: 46
End: 2021-01-22
Payer: COMMERCIAL

## 2021-01-22 VITALS
TEMPERATURE: 97 F | RESPIRATION RATE: 18 BRPM | HEIGHT: 62 IN | SYSTOLIC BLOOD PRESSURE: 110 MMHG | HEART RATE: 97 BPM | WEIGHT: 155 LBS | BODY MASS INDEX: 28.52 KG/M2 | OXYGEN SATURATION: 95 % | DIASTOLIC BLOOD PRESSURE: 70 MMHG

## 2021-01-22 DIAGNOSIS — IMO0002 DIABETES MELLITUS WITH NEUROLOGICAL MANIFESTATIONS, UNCONTROLLED: ICD-10-CM

## 2021-01-22 DIAGNOSIS — F41.9 ANXIETY AND DEPRESSION: Primary | ICD-10-CM

## 2021-01-22 DIAGNOSIS — R09.89 GLOBUS SENSATION: ICD-10-CM

## 2021-01-22 DIAGNOSIS — F32.A ANXIETY AND DEPRESSION: Primary | ICD-10-CM

## 2021-01-22 PROBLEM — R09.A2 GLOBUS SENSATION: Status: ACTIVE | Noted: 2021-01-22

## 2021-01-22 PROCEDURE — 3008F BODY MASS INDEX DOCD: CPT | Performed by: NURSE PRACTITIONER

## 2021-01-22 PROCEDURE — 4004F PT TOBACCO SCREEN RCVD TLK: CPT | Performed by: NURSE PRACTITIONER

## 2021-01-22 PROCEDURE — 3725F SCREEN DEPRESSION PERFORMED: CPT | Performed by: NURSE PRACTITIONER

## 2021-01-22 PROCEDURE — 99213 OFFICE O/P EST LOW 20 MIN: CPT | Performed by: NURSE PRACTITIONER

## 2021-01-22 RX ORDER — PEN NEEDLE, DIABETIC 31 G X1/4"
1 NEEDLE, DISPOSABLE MISCELLANEOUS DAILY
COMMUNITY
Start: 2021-01-04 | End: 2021-04-04

## 2021-01-22 RX ORDER — INSULIN DETEMIR 100 [IU]/ML
15 INJECTION, SOLUTION SUBCUTANEOUS
COMMUNITY
Start: 2021-01-04 | End: 2021-08-03 | Stop reason: ALTCHOICE

## 2021-01-22 RX ORDER — OMEPRAZOLE 40 MG/1
40 CAPSULE, DELAYED RELEASE ORAL
Qty: 30 CAPSULE | Refills: 0 | Status: SHIPPED | OUTPATIENT
Start: 2021-01-22 | End: 2021-02-12

## 2021-01-22 NOTE — PATIENT INSTRUCTIONS
Start Omeprazole daily for the next month    Let me know if your symptoms do not improve after you take the medicine  Call to schedule your mammogram

## 2021-01-22 NOTE — ASSESSMENT & PLAN NOTE
Referral provided for in network endo      Lab Results   Component Value Date    HGBA1C 7 5 07/06/2020

## 2021-01-22 NOTE — PROGRESS NOTES
Assessment/Plan:    1  Anxiety and depression  Assessment & Plan: Will continue Lexapro 10mg  2  Globus sensation  Assessment & Plan:  Start Omeprazole daily  If no improvement, consider CT  May also need EGD as well  Orders:  -     omeprazole (PriLOSEC) 40 MG capsule; Take 1 capsule (40 mg total) by mouth daily before breakfast    3  Diabetes mellitus with neurological manifestations, uncontrolled ([address] )  Assessment & Plan:  Referral provided for in network endo  Lab Results   Component Value Date    HGBA1C 7 5 07/06/2020       Orders:  -     Ambulatory referral to Endocrinology; Future          Patient Instructions   Start Omeprazole daily for the next month  Let me know if your symptoms do not improve after you take the medicine  Call to schedule your mammogram          Return in about 3 months (around 4/22/2021) for Annual physical     Subjective:      Patient ID: Etta Islas is a 40 y o  female  Chief Complaint   Patient presents with    Follow-up     medication review  [facility]       Here today for f/u  Did not tolerate Xigduo  She was taken of Xigduo and Metformin  Awaiting prior auth to for a new drug  Sugars have been labile  Started seeing a counselor  Feels better with the Lexapro  Feels a lot of her mood issues are related to her physical issues          The following portions of the patient's history were reviewed and updated as appropriate: allergies, current medications, past family history, past medical history, past social history, past surgical history and problem list     Review of Systems      Current Outpatient Medications   Medication Sig Dispense Refill    ALPRAZolam (XANAX) 0 25 mg tablet Take 1 tablet (0 25 mg total) by mouth daily at bedtime as needed for anxiety 30 tablet 0    ergocalciferol (VITAMIN D2) 50,000 units 50,000 Units once a week       escitalopram (LEXAPRO) 10 mg tablet Take 1 tablet (10 mg total) by mouth daily 90 tablet 1    gabapentin (NEURONTIN) 800 mg tablet take 1 tablet by mouth three times a day 90 tablet 1    insulin detemir (Levemir FlexTouch) 100 Units/mL injection pen Inject 15 Units under the skin      Insulin Pen Needle (Pen Needles) 31G X 6 MM MISC Use 1 Stick daily      linaGLIPtin (Tradjenta) 5 MG TABS Take 5 mg by mouth daily Tradjenta 1 tablet daily      Multiple Vitamin (DAILY VALUE MULTIVITAMIN) TABS Take by mouth daily       ramipril (ALTACE) 2 5 mg capsule take 1 capsule by mouth once daily 90 capsule 0    repaglinide (PRANDIN) 2 mg tablet Take 2 mg by mouth 3 (three) times a day before meals       rosuvastatin (CRESTOR) 5 mg tablet Take 5 mg by mouth daily      omeprazole (PriLOSEC) 40 MG capsule Take 1 capsule (40 mg total) by mouth daily before breakfast 30 capsule 0     No current facility-administered medications for this visit  Objective:    /70   Pulse 97   Temp (!) 97 °F (36 1 °C)   Resp 18   Ht 5' 2" (1 575 m)   Wt 70 3 kg (155 lb)   LMP 12/31/2020 (Exact Date)   SpO2 95%   BMI 28 35 kg/m²        Physical Exam  Vitals signs and nursing note reviewed  Constitutional:       Appearance: She is well-developed  HENT:      Head: Normocephalic and atraumatic  Right Ear: Tympanic membrane normal       Left Ear: Tympanic membrane normal    Cardiovascular:      Rate and Rhythm: Normal rate and regular rhythm  Heart sounds: Normal heart sounds  No murmur  Pulmonary:      Effort: Pulmonary effort is normal       Breath sounds: Normal breath sounds  Lymphadenopathy:      Cervical: No cervical adenopathy  Skin:     General: Skin is warm and dry  Capillary Refill: Capillary refill takes less than 2 seconds  Neurological:      Mental Status: She is alert     Psychiatric:         Mood and Affect: Mood normal          Behavior: Behavior normal                 ASA Rowan

## 2021-01-24 DIAGNOSIS — Z23 ENCOUNTER FOR IMMUNIZATION: ICD-10-CM

## 2021-02-03 ENCOUNTER — IMMUNIZATIONS (OUTPATIENT)
Dept: FAMILY MEDICINE CLINIC | Facility: HOSPITAL | Age: 46
End: 2021-02-03

## 2021-02-03 DIAGNOSIS — Z23 ENCOUNTER FOR IMMUNIZATION: Primary | ICD-10-CM

## 2021-02-03 PROCEDURE — 91300 SARS-COV-2 / COVID-19 MRNA VACCINE (PFIZER-BIONTECH) 30 MCG: CPT

## 2021-02-03 PROCEDURE — 0001A SARS-COV-2 / COVID-19 MRNA VACCINE (PFIZER-BIONTECH) 30 MCG: CPT

## 2021-02-12 DIAGNOSIS — R09.89 GLOBUS SENSATION: ICD-10-CM

## 2021-02-12 LAB — HBA1C MFR BLD HPLC: 9.3 %

## 2021-02-12 RX ORDER — OMEPRAZOLE 40 MG/1
CAPSULE, DELAYED RELEASE ORAL
Qty: 30 CAPSULE | Refills: 0 | Status: SHIPPED | OUTPATIENT
Start: 2021-02-12 | End: 2021-08-03 | Stop reason: ALTCHOICE

## 2021-02-26 ENCOUNTER — IMMUNIZATIONS (OUTPATIENT)
Dept: FAMILY MEDICINE CLINIC | Facility: HOSPITAL | Age: 46
End: 2021-02-26

## 2021-02-26 DIAGNOSIS — Z23 ENCOUNTER FOR IMMUNIZATION: Primary | ICD-10-CM

## 2021-02-26 PROCEDURE — 91300 SARS-COV-2 / COVID-19 MRNA VACCINE (PFIZER-BIONTECH) 30 MCG: CPT

## 2021-02-26 PROCEDURE — 0002A SARS-COV-2 / COVID-19 MRNA VACCINE (PFIZER-BIONTECH) 30 MCG: CPT

## 2021-03-26 DIAGNOSIS — R20.2 PARESTHESIA: ICD-10-CM

## 2021-03-26 RX ORDER — GABAPENTIN 800 MG/1
TABLET ORAL
Qty: 90 TABLET | Refills: 1 | Status: SHIPPED | OUTPATIENT
Start: 2021-03-26 | End: 2021-05-21 | Stop reason: SDUPTHER

## 2021-04-21 ENCOUNTER — OFFICE VISIT (OUTPATIENT)
Dept: PAIN MEDICINE | Facility: MEDICAL CENTER | Age: 46
End: 2021-04-21
Payer: COMMERCIAL

## 2021-04-21 VITALS
DIASTOLIC BLOOD PRESSURE: 74 MMHG | SYSTOLIC BLOOD PRESSURE: 118 MMHG | HEIGHT: 63 IN | TEMPERATURE: 98 F | BODY MASS INDEX: 30.12 KG/M2 | WEIGHT: 170 LBS | HEART RATE: 83 BPM

## 2021-04-21 DIAGNOSIS — M46.1 SACROILIITIS (HCC): Primary | ICD-10-CM

## 2021-04-21 DIAGNOSIS — G89.29 CHRONIC BILATERAL LOW BACK PAIN WITHOUT SCIATICA: ICD-10-CM

## 2021-04-21 DIAGNOSIS — M54.50 CHRONIC BILATERAL LOW BACK PAIN WITHOUT SCIATICA: ICD-10-CM

## 2021-04-21 PROCEDURE — 99214 OFFICE O/P EST MOD 30 MIN: CPT | Performed by: NURSE PRACTITIONER

## 2021-04-21 NOTE — PROGRESS NOTES
Assessment:  1  Sacroiliitis ([address] )    2  Chronic bilateral low back pain without sciatica        Plan:  The patient was last seen in the office on 2020 and at that time she was to be scheduled for a left hip injection under fluoro guidance and a left sided greater trochanteric bursa injection  The patient never had either of these procedures done because she stated that the pain was tolerable  Patient exercises regularly doing yoga and she is here today because she states she has low back pain and upper buttock pain that is not improving with exercise and chiropractic care  Clinical findings were evidence of bilateral sacroiliitis  At this time we will do the followin  Schedule bilateral sacroiliac joint injections    2  Referral to physical therapy    3  Follow-up 4 weeks after injection or sooner if warranted    Complete risks and benefits including bleeding, infection, tissue reaction, nerve injury and allergic reaction were discussed  The approach was demonstrated using models and literature was provided  Verbal and written consent was obtained  My impressions and treatment recommendations were discussed in detail with the patient who verbalized understanding and had no further questions  Discharge instructions were provided  I personally saw and examined the patient and I agree with the above discussed plan of care  Orders Placed This Encounter   Procedures    FL spine and pain procedure     With Dr Azael Alamo     Standing Status:   Future     Standing Expiration Date:   2025     Order Specific Question:   Reason for Exam:     Answer:   bilateral SIJ injections under fluoroscopy     Order Specific Question:   Is the patient pregnant? Answer:   No     Order Specific Question:   Anticoagulant hold needed?      Answer:   No    Ambulatory referral to Physical Therapy     Standing Status:   Future     Standing Expiration Date:   2022     Referral Priority:   Routine     Referral Type:   Physical Therapy     Referral Reason:   Specialty Services Required     Requested Specialty:   Physical Therapy     Number of Visits Requested:   1     Expiration Date:   4/21/2022     No orders of the defined types were placed in this encounter  History of Present Illness:  Cristine Bradley is a 40 y o  female who presents for a follow up office visit in regards to Back Pain  The patients current symptoms include  Bilateral low back and upper buttock pain with a pain score of 6/10 that is constant to some degree and gets worse in the evening  The quality of her pain is sharp, throbbing and shooting  She states that there is no radicular pain down her legs  She has used muscle rubs such as lidocaine cream, chiropractic care and yoga to help improve her pain  I have personally reviewed and/or updated the patient's past medical history, past surgical history, family history, social history, current medications, allergies, and vital signs today  Review of Systems   Musculoskeletal: Positive for back pain  Neurological: Positive for weakness         Patient Active Problem List   Diagnosis    Adverse reaction to statin medication    Allergic asthma, mild intermittent, uncomplicated    Anxiety and depression    Diabetes mellitus with neurological manifestations, uncontrolled ([address] )    Diabetes mellitus with polyneuropathy ([address] )    Dysesthesia    Fatigue    Left breast mass    Low back pain    Nicotine dependence    Body mass index (BMI) of 28 0-28 9 in adult    [facility] joint arthropathy    Plantar fasciitis    Onychomycosis    Globus sensation       Past Medical History:   Diagnosis Date    Acute gastritis     [id number] RESOLVED    Breast pain     [id number] RESOLVED    Diabetes ([address] )     MELLITUS    Viral gastroenteritis     [id number] RESOLVED       Past Surgical History:   Procedure Laterality Date    HAND SURGERY      SKIN BIOPSY         Family History   Problem Relation Age of Onset    Hypertension Mother     Breast cancer Family     Colon cancer Family     Diabetes Family         MELLITUS    Lung cancer Family     Mental illness Neg Hx     Substance Abuse Neg Hx        Social History     Occupational History    Not on file   Tobacco Use    Smoking status: Current Every Day Smoker    Smokeless tobacco: Never Used   Substance and Sexual Activity    Alcohol use: Not Currently     Comment: 2-3 drinks     Drug use: No    Sexual activity: Not on file       Current Outpatient Medications on File Prior to Visit   Medication Sig    ALPRAZolam (XANAX) 0 25 mg tablet Take 1 tablet (0 25 mg total) by mouth daily at bedtime as needed for anxiety    Continuous Blood Gluc  (FreeStyle Melvin 14 Day [place]) JAVAN 1 kit every 14 (fourteen) days    Continuous Blood Gluc Sensor (FreeStyle Melvin 14 Day Sensor) MISC Inject 1 kit under the skin every 14 (fourteen) days    ergocalciferol (VITAMIN D2) 50,000 units 50,000 Units once a week     escitalopram (LEXAPRO) 10 mg tablet Take 1 tablet (10 mg total) by mouth daily    gabapentin (NEURONTIN) 800 mg tablet take 1 tablet by mouth three times a day    linaGLIPtin (Tradjenta) 5 MG TABS Take 5 mg by mouth daily Tradjenta 1 tablet daily    Multiple Vitamin (DAILY VALUE MULTIVITAMIN) TABS Take by mouth daily     omeprazole (PriLOSEC) 40 MG capsule take 1 capsule by mouth once daily before breakfast    ramipril (ALTACE) 2 5 mg capsule take 1 capsule by mouth once daily    repaglinide (PRANDIN) 2 mg tablet Take 2 mg by mouth 3 (three) times a day before meals     rosuvastatin (CRESTOR) 5 mg tablet Take 5 mg by mouth daily    insulin detemir (Levemir FlexTouch) 100 Units/mL injection pen Inject 15 Units under the skin     No current facility-administered medications on file prior to visit          No Known Allergies    Physical Exam:    /74   Pulse 83   Temp 98 °F (36 7 °C)   Ht 5' 2 7" (1 593 m)   Wt 77 1 kg (170 lb)   BMI 30 40 kg/m²     Constitutional:normal, well developed, well nourished, alert, in no distress and non-toxic and no overt pain behavior    Eyes:anicteric  HEENT:grossly intact  Neck:supple, symmetric, trachea midline and no masses   Pulmonary:even and unlabored  Cardiovascular:No edema or pitting edema present  Skin:Normal without rashes or lesions and well hydrated  Psychiatric:Mood and affect appropriate  Neurologic:Cranial Nerves II-XII grossly intact  Musculoskeletal:antalgic   Lumbar Spine Exam    Appearance:  Normal lordosis  Palpation/Tenderness:  left lumbar paraspinal tenderness  right lumbar paraspinal tenderness  left sacroiliac joint tenderness  right sacroiliac joint tenderness  Special Tests:  Left Straight Leg Test:  Reproduces pain in hip/uppper buttock  Right Straight Leg Test: reproduces pain in hip/upper buttock   Left Eliceo's Maneuver:  positive  Right Eliceo's Maneuver:  positive  Left Pelvic Distraction Test:  positive  Right Pelvic Distraction Test:  positive  Positive Brian Finger Test    Imaging

## 2021-04-23 ENCOUNTER — OFFICE VISIT (OUTPATIENT)
Dept: FAMILY MEDICINE CLINIC | Facility: CLINIC | Age: 46
End: 2021-04-23
Payer: COMMERCIAL

## 2021-04-23 VITALS
WEIGHT: 171 LBS | RESPIRATION RATE: 16 BRPM | DIASTOLIC BLOOD PRESSURE: 70 MMHG | TEMPERATURE: 97 F | HEART RATE: 88 BPM | BODY MASS INDEX: 31.47 KG/M2 | SYSTOLIC BLOOD PRESSURE: 114 MMHG | HEIGHT: 62 IN

## 2021-04-23 DIAGNOSIS — Z00.00 ROUTINE ADULT HEALTH MAINTENANCE: Primary | ICD-10-CM

## 2021-04-23 DIAGNOSIS — IMO0002 DIABETES MELLITUS WITH NEUROLOGICAL MANIFESTATIONS, UNCONTROLLED: ICD-10-CM

## 2021-04-23 DIAGNOSIS — J45.20 ALLERGIC ASTHMA, MILD INTERMITTENT, UNCOMPLICATED: ICD-10-CM

## 2021-04-23 DIAGNOSIS — Z12.31 ENCOUNTER FOR SCREENING MAMMOGRAM FOR MALIGNANT NEOPLASM OF BREAST: ICD-10-CM

## 2021-04-23 PROBLEM — E11.43 GASTROPARESIS DUE TO DM (HCC): Status: ACTIVE | Noted: 2021-03-10

## 2021-04-23 PROBLEM — K31.84 GASTROPARESIS DUE TO DM (HCC): Status: ACTIVE | Noted: 2021-03-10

## 2021-04-23 PROCEDURE — 4004F PT TOBACCO SCREEN RCVD TLK: CPT | Performed by: NURSE PRACTITIONER

## 2021-04-23 PROCEDURE — 3008F BODY MASS INDEX DOCD: CPT | Performed by: NURSE PRACTITIONER

## 2021-04-23 PROCEDURE — 3725F SCREEN DEPRESSION PERFORMED: CPT | Performed by: NURSE PRACTITIONER

## 2021-04-23 PROCEDURE — 99396 PREV VISIT EST AGE 40-64: CPT | Performed by: NURSE PRACTITIONER

## 2021-04-23 RX ORDER — ALBUTEROL SULFATE 90 UG/1
2 AEROSOL, METERED RESPIRATORY (INHALATION) EVERY 6 HOURS PRN
Qty: 1 INHALER | Refills: 1 | Status: SHIPPED | OUTPATIENT
Start: 2021-04-23

## 2021-04-23 RX ORDER — INSULIN LISPRO 100 [IU]/ML
INJECTION, SOLUTION INTRAVENOUS; SUBCUTANEOUS
COMMUNITY
Start: 2021-03-05 | End: 2021-08-22 | Stop reason: ALTCHOICE

## 2021-04-23 RX ORDER — BLOOD SUGAR DIAGNOSTIC
STRIP MISCELLANEOUS
COMMUNITY
Start: 2021-02-10 | End: 2022-04-08

## 2021-04-23 RX ORDER — INSULIN GLARGINE 100 [IU]/ML
10 INJECTION, SOLUTION SUBCUTANEOUS DAILY
COMMUNITY
Start: 2021-02-24 | End: 2022-03-01

## 2021-04-23 RX ORDER — INSULIN LISPRO 100 [IU]/ML
4 INJECTION, SOLUTION INTRAVENOUS; SUBCUTANEOUS
COMMUNITY
Start: 2021-02-04 | End: 2021-08-22 | Stop reason: ALTCHOICE

## 2021-04-23 NOTE — PATIENT INSTRUCTIONS

## 2021-04-23 NOTE — ASSESSMENT & PLAN NOTE
Sugars are improving on insulin, however she does not want to continue taking this long term  She has f/u with her endocrinologist arranged  Will obtain labs from February       Lab Results   Component Value Date    HGBA1C 7 5 07/06/2020

## 2021-04-23 NOTE — PROGRESS NOTES
FAMILY PRACTICE HEALTH MAINTENANCE OFFICE VISIT  [facility] - [address]    NAME: Linette Rossi  AGE: 40 y o  SEX: female  : 1975     DATE: 2021    Assessment and Plan     1  Routine adult health maintenance  -     Ambulatory referral to Obstetrics / Gynecology; Future    2  Encounter for screening mammogram for malignant neoplasm of breast  -     Mammo screening bilateral w 3d & cad; Future; Expected date: 2021    3  Allergic asthma, mild intermittent, uncomplicated  Assessment & Plan:  Albuterol rx sent to pharmacy  Orders:  -     albuterol (PROVENTIL HFA,VENTOLIN HFA) 90 mcg/act inhaler; Inhale 2 puffs every 6 (six) hours as needed for wheezing or shortness of breath    4  Diabetes mellitus with neurological manifestations, uncontrolled ([facility] [address] )  Assessment & Plan:  Sugars are improving on insulin, however she does not want to continue taking this long term  She has f/u with her endocrinologist arranged  Will obtain labs from February  Lab Results   Component Value Date    HGBA1C 7 5 2020           · Patient Counseling:   · Nutrition: Stressed importance of a well balanced diet, moderation of sodium/saturated fat, caloric balance and sufficient intake of fiber  · Exercise: Stressed the importance of regular exercise with a goal of 150 minutes per week  · Dental Health: Discussed daily flossing and brushing and regular dental visits     · Immunizations reviewed: Up To Date  · Discussed benefits of:  Mammogram , Cervical Cancer screening and Screening labs   BMI Counseling: Body mass index is 31 28 kg/m²  Discussed with patient's BMI with her  The BMI is above normal  Exercise recommendations include moderate aerobic physical activity for 150 minutes/week  Return in about 4 months (around 2021)          Chief Complaint     Chief Complaint   Patient presents with   Celestina Diaz for GYN exams [id number]       History of Present Illness     Here today for CPE  She was started on insulin and is having difficulty grasping that she needs to take this  She has gained weight  She does report that her sugars are much better, however she still struggles  Up to date with gyn exam  Mood stable on Lexapro  Labs 2021  Notes intermittent cough and wheezing secondary to asthma  No other complaints or concerns      Well Adult Physical   Patient here for a comprehensive physical exam       Diet and Physical Activity  Diet: low carbohydrate diet  Exercise: infrequently      Depression Screen  PHQ-9 Depression Screening    PHQ-9:   Frequency of the following problems over the past two weeks:      Little interest or pleasure in doing things: 0 - not at all  Feeling down, depressed, or hopeless: 0 - not at all  Trouble falling or staying asleep, or sleeping too much: 0 - not at all  Feeling tired or having little energy: 3 - nearly every day  Poor appetite or overeatin - not at all  Feeling bad about yourself - or that you are a failure or have let yourself or your family down: 0 - not at all  Trouble concentrating on things, such as reading the newspaper or watching television: 0 - not at all  Moving or speaking so slowly that other people could have noticed   Or the opposite - being so fidgety or restless that you have been moving around a lot more than usual: 0 - not at all  Thoughts that you would be better off dead, or of hurting yourself in some way: 0 - not at all  PHQ-2 Score: 0  PHQ-9 Score: 3          General Health  Hearing: Normal:  bilateral  Vision: most recent eye exam >1 year  Dental: regular dental visits    Reproductive Health  Follows with gynecologist      The following portions of the patient's history were reviewed and updated as appropriate: allergies, current medications, past family history, past medical history, past social history, past surgical history and problem list     Review of Systems     Review of Systems   Constitutional: Negative  Respiratory: Negative  Cardiovascular: Negative  Gastrointestinal: Negative  Neurological: Negative  Psychiatric/Behavioral: Negative          Past Medical History     Past Medical History:   Diagnosis Date    Acute gastritis     [id number] RESOLVED    Breast pain     [id number] RESOLVED    Diabetes [address] [address] )     MELLITUS    Viral gastroenteritis     [id number] RESOLVED       Past Surgical History     Past Surgical History:   Procedure Laterality Date    HAND SURGERY      SKIN BIOPSY         Social History     Social History     Socioeconomic History    Marital status: /Civil Union     Spouse name: None    Number of children: None    Years of education: None    Highest education level: None   Occupational History    None   Social Needs    Financial resource strain: None    Food insecurity     Worry: None     Inability: None    Transportation needs     Medical: None     Non-medical: None   Tobacco Use    Smoking status: Current Every Day Smoker    Smokeless tobacco: Never Used   Substance and Sexual Activity    Alcohol use: Not Currently     Comment: 2-3 drinks     Drug use: No    Sexual activity: None   Lifestyle    Physical activity     Days per week: None     Minutes per session: None    Stress: None   Relationships    Social connections     Talks on phone: None     Gets together: None     Attends Tenriism service: None     Active member of club or organization: None     Attends meetings of clubs or organizations: None     Relationship status: None    Intimate partner violence     Fear of current or ex partner: None     Emotionally abused: None     Physically abused: None     Forced sexual activity: None   Other Topics Concern    None   Social History Narrative    None       Family History     Family History   Problem Relation Age of Onset    Hypertension Mother     Breast cancer Family     Colon cancer Family     Diabetes Family MELLITUS    Lung cancer Family     Mental illness Neg Hx     Substance Abuse Neg Hx        Current Medications       Current Outpatient Medications:     ALPRAZolam (XANAX) 0 25 mg tablet, Take 1 tablet (0 25 mg total) by mouth daily at bedtime as needed for anxiety, Disp: 30 tablet, Rfl: 0    Continuous Blood Gluc  (FreeStyle Melvin 14 Day [place]) JAVAN, 1 kit every 14 (fourteen) days, Disp: , Rfl:     Continuous Blood Gluc Sensor (FreeStyle Melvin 14 Day Sensor) MISC, Inject 1 kit under the skin every 14 (fourteen) days, Disp: , Rfl:     ergocalciferol (VITAMIN D2) 50,000 units, 50,000 Units once a week , Disp: , Rfl:     escitalopram (LEXAPRO) 10 mg tablet, Take 1 tablet (10 mg total) by mouth daily, Disp: 90 tablet, Rfl: 1    gabapentin (NEURONTIN) 800 mg tablet, take 1 tablet by mouth three times a day, Disp: 90 tablet, Rfl: 1    HumaLOG KwikPen 100 units/mL injection pen, INJECT 4 UNITS UNDER THE SKIN THREE TIMES A DAY BEFORE MEALS, Disp: , Rfl:     insulin glargine (Lantus SoloStar) 100 units/mL injection pen, Inject 10 Units under the skin daily , Disp: , Rfl:     linaGLIPtin (Tradjenta) 5 MG TABS, Take 5 mg by mouth daily Tradjenta 1 tablet daily, Disp: , Rfl:     Multiple Vitamin (DAILY VALUE MULTIVITAMIN) TABS, Take by mouth daily , Disp: , Rfl:     omeprazole (PriLOSEC) 40 MG capsule, take 1 capsule by mouth once daily before breakfast, Disp: 30 capsule, Rfl: 0    OneTouch Ultra test strip, use 1 TEST STRIP to TEST BLOOD SUGAR twice a day, Disp: , Rfl:     ramipril (ALTACE) 2 5 mg capsule, take 1 capsule by mouth once daily, Disp: 90 capsule, Rfl: 0    repaglinide (PRANDIN) 2 mg tablet, Take 2 mg by mouth 3 (three) times a day before meals , Disp: , Rfl:     rosuvastatin (CRESTOR) 5 mg tablet, Take 5 mg by mouth daily, Disp: , Rfl:     albuterol (PROVENTIL HFA,VENTOLIN HFA) 90 mcg/act inhaler, Inhale 2 puffs every 6 (six) hours as needed for wheezing or shortness of breath, Disp: 1 Inhaler, Rfl: 1    insulin detemir (Levemir FlexTouch) 100 Units/mL injection pen, Inject 15 Units under the skin, Disp: , Rfl:     insulin lispro (HumaLOG) 100 units/mL injection pen, Inject 4 Units under the skin, Disp: , Rfl:      Allergies     No Known Allergies    Objective     /70   Pulse 88   Temp (!) 97 °F (36 1 °C)   Resp 16   Ht 5' 2" (1 575 m)   Wt 77 6 kg (171 lb)   LMP  (Within Weeks) Comment: 2 weeks ago  BMI 31 28 kg/m²      Physical Exam  Vitals signs and nursing note reviewed  Constitutional:       Appearance: She is well-developed  HENT:      Head: Normocephalic  Right Ear: External ear normal       Left Ear: External ear normal       Nose: Nose normal    Eyes:      Conjunctiva/sclera: Conjunctivae normal       Pupils: Pupils are equal, round, and reactive to light  Neck:      Musculoskeletal: Neck supple  Thyroid: No thyromegaly  Cardiovascular:      Rate and Rhythm: Normal rate and regular rhythm  Pulses: Normal pulses  Heart sounds: Normal heart sounds  No murmur  Pulmonary:      Effort: Pulmonary effort is normal       Breath sounds: Normal breath sounds  Abdominal:      General: Bowel sounds are normal  There is no distension  Palpations: Abdomen is soft  Musculoskeletal: Normal range of motion  Lymphadenopathy:      Cervical: No cervical adenopathy  Skin:     General: Skin is warm and dry  Capillary Refill: Capillary refill takes less than 2 seconds  Neurological:      Mental Status: She is alert and oriented to person, place, and time  Deep Tendon Reflexes: Reflexes are normal and symmetric     Psychiatric:         Mood and Affect: Mood normal          Behavior: Behavior normal             Visual Acuity Screening    Right eye Left eye Both eyes   Without correction: 20/20 20/25 20/15   With correction:              Pricila Topete, [address] [place] [address]

## 2021-04-26 ENCOUNTER — TELEPHONE (OUTPATIENT)
Dept: ADMINISTRATIVE | Facility: OTHER | Age: 46
End: 2021-04-26

## 2021-04-26 NOTE — LETTER
Lab Result(s) Request Form: Urine Microalbumin or Protein Creatinine Ratio      Date Requested: 21  Patient: Kia Post  Patient : 1975   Referring Provider: ASA Faustin        Date of Lab Collection ______________________________       The above patient has informed us that they have completed their   most recent Urine Microalbumin or Protein Creatinine Ratio at your facility  Please complete   this form and attach all corresponding procedure reports/results  Comments __________________________________________________________  ____________________________________________________________________  ____________________________________________________________________  ____________________________________________________________________    Collecting/Resulting Facility  ___________________________________________  Form Completed By (print name) ________________________________________    Signature ___________________________________________________________      These reports are needed for  compliance    Please fax this completed form and a copy of the lab results/report to our office located at [place] [address] as soon as possible [phone] [facility] [place]: Phone [phone]    We thank you for your assistance in treating our mutual patient

## 2021-04-26 NOTE — TELEPHONE ENCOUNTER
Upon review of the In Basket request and the patient's chart, initial outreach has been made via fax, please see Contacts section for details       Thank you  Jorge A Peterson

## 2021-04-26 NOTE — LETTER
Lab Result(s) Request Form: Hemoglobin A1c and Urine Microalbumin or Protein Creatinine Ratio      Date Requested: 21  Patient: Jefry Lambert  Patient : 1975   Referring Provider: ASA Garcia        Date of Lab Collection ______________________________       The above patient has informed us that they have completed their   most recent Hemoglobin A1c and Urine Microalbumin or Protein Creatinine Ratio at your facility  Please complete   this form and attach all corresponding procedure reports/results  Comments __________________________________________________________  ____________________________________________________________________  ____________________________________________________________________  ____________________________________________________________________    Collecting/Resulting Facility  ___________________________________________  Form Completed By (print name) ________________________________________    Signature ___________________________________________________________      These reports are needed for  compliance    Please fax this completed form and a copy of the lab results/report to our office located at [place] [address] as soon as possible to [phone] yisel Moody Party: Phone [phone]    We thank you for your assistance in treating our mutual patient

## 2021-04-26 NOTE — TELEPHONE ENCOUNTER
----- Message from [address] sent at 4/23/2021 10:45 AM EDT -----  04/23/21 10:45 AM    Hello, our patient Rashaad Tobias has had Hemoglobin A1c and Urine Microalbumin completed/performed  Please assist in updating the patient chart by making an External outreach to [facility], Dr Harris Gaitan facility located in [facility]  The date of service is February 2021  Labs were done at [facility]   We are unable to obtain labs through [employer] and need to contact the endocrinologist directly for these results    I reviewed Care Everywhere and they are not available though there    Thank you,  ASA Ruiz  [facility]

## 2021-04-27 NOTE — TELEPHONE ENCOUNTER
Upon review of the In Basket request we were able to locate, review, and update the patient chart as requested for Hemoglobin A1c  Any additional questions or concerns should be emailed to the Practice Liaisons via [email or web address] email, please do not reply via In Basket      Thank you  Keeley Kevin

## 2021-04-30 NOTE — TELEPHONE ENCOUNTER
As a follow-up, a second attempt has been made for outreach via fax, please see Contacts section for details      Thank you  Homero Gary

## 2021-05-04 NOTE — TELEPHONE ENCOUNTER
As a final attempt, a third outreach has been made via telephone call  Please see Contacts section for details  This encounter will be closed and completed by end of day  Should we receive the requested information because of previous outreach attempts, the requested patient's chart will be updated appropriately       Thank you  Teddy Castelan

## 2021-05-05 NOTE — TELEPHONE ENCOUNTER
Upon review of the In Basket request we have not received the results for microalbumin after three attempts  Any additional questions or concerns should be emailed to the Practice Liaisons via [email or web address] email, please do not reply via In Basket      Thank you  Bary Goldmann

## 2021-05-21 ENCOUNTER — TELEPHONE (OUTPATIENT)
Dept: RADIOLOGY | Facility: MEDICAL CENTER | Age: 46
End: 2021-05-21

## 2021-05-21 DIAGNOSIS — R20.2 PARESTHESIA: ICD-10-CM

## 2021-05-21 RX ORDER — GABAPENTIN 800 MG/1
800 TABLET ORAL 3 TIMES DAILY
Qty: 90 TABLET | Refills: 1 | Status: SHIPPED | OUTPATIENT
Start: 2021-05-21 | End: 2021-08-19 | Stop reason: SDUPTHER

## 2021-05-21 NOTE — TELEPHONE ENCOUNTER
Please let the patient know that refill was sent to pharmacy on file  Also, she may have PCP take over prescribing if it's more convenient for her

## 2021-05-21 NOTE — TELEPHONE ENCOUNTER
Gabapentin last rx'd on 3/26/21 by SIMIN w/ 1 refill  Last OV 4/21/21 w/ LH  Please advise regarding pt requesting Gabapentin refill  Thank you

## 2021-05-21 NOTE — TELEPHONE ENCOUNTER
Called to confirm procedure for 5/24/21 patient is cancelling does not need the injection  Patient said she does need a refill on the Gabapentin  Please assist and advise  Thank you

## 2021-05-21 NOTE — TELEPHONE ENCOUNTER
Attempted to reach pt  LMOM for pt to c/b     -If pt returns call, please advise her of LH's notation   Thank you -

## 2021-07-23 DIAGNOSIS — F32.A ANXIETY AND DEPRESSION: ICD-10-CM

## 2021-07-23 DIAGNOSIS — F41.9 ANXIETY AND DEPRESSION: ICD-10-CM

## 2021-07-23 RX ORDER — ESCITALOPRAM OXALATE 10 MG/1
TABLET ORAL
Qty: 90 TABLET | Refills: 1 | Status: SHIPPED | OUTPATIENT
Start: 2021-07-23 | End: 2022-07-09

## 2021-08-03 ENCOUNTER — ANNUAL EXAM (OUTPATIENT)
Dept: OBGYN CLINIC | Facility: CLINIC | Age: 46
End: 2021-08-03
Payer: COMMERCIAL

## 2021-08-03 VITALS
TEMPERATURE: 97.6 F | DIASTOLIC BLOOD PRESSURE: 80 MMHG | WEIGHT: 175 LBS | SYSTOLIC BLOOD PRESSURE: 120 MMHG | HEIGHT: 62 IN | BODY MASS INDEX: 32.2 KG/M2

## 2021-08-03 DIAGNOSIS — N92.6 IRREGULAR MENSES: ICD-10-CM

## 2021-08-03 DIAGNOSIS — Z01.419 ENCNTR FOR GYN EXAM (GENERAL) (ROUTINE) W/O ABN FINDINGS: Primary | ICD-10-CM

## 2021-08-03 PROCEDURE — 99396 PREV VISIT EST AGE 40-64: CPT | Performed by: NURSE PRACTITIONER

## 2021-08-03 RX ORDER — DAPAGLIFLOZIN AND METFORMIN HYDROCHLORIDE 5; 500 MG/1; MG/1
1 TABLET, FILM COATED, EXTENDED RELEASE ORAL
COMMUNITY
Start: 2021-07-30

## 2021-08-03 NOTE — PROGRESS NOTES
Assessment/Plan   Diagnoses and all orders for this visit:    Irregular menses  -     TSH, 3rd generation  -     T4, free  -     US pelvis complete w transvaginal; Future    Other orders  -     Dapagliflozin-metFORMIN HCl ER (Xigduo XR) 5-500 MG TB24; Take 1 tablet by mouth      Discussion    Reviewed normal exam today  Pap with HPV done today  Discussed with patient potential causes of irregular menses  Rx for TFTs, if normal will then have pelvic ultrasound  If continues to have bleeding every 2 weeks may recommend endometrial biopsy  Procedure reviewed  Pt declines any hormonal contraceptives in order to regulate menses  Normal breast exam today  Monthly SBEs advised  Mammograms yearly  Has rx  Encourage at least 1200 mg calcium citrate + 2000 IUs vitamin D3 divided through diet and supplement throughout the day  Encourage 30-40 min weight bearing exercise most days of week  Colon cancer screening with a colonoscopy is recommended starting at age 39 and reviewed benefits  All questions have been answered to her satisfaction  RTO for annual or sooner if needed    Subjective     Paulo Villalta is a 40 y o  female new patient who presents for annual well woman exam    Last exam 2 years ago Pap Normal  Pap guidelines reviewed with patient  Pt would like Pap today  Pt denies any abnormal vaginal discharge, itching, or odor  Pt in a mutually exclusive relationship () with a male partner and denies the need for STD testing today  Denies any pain or bleeding with intercourse  Menstrual Cycle:  LMP: 7/20/2021  Period Cycle (Days): 14  Period Duration (Days): 3  Period Pattern: (!) Irregular  Menstrual Flow: Heavy  Menstrual Control: Tampon, Maxi pad  Dysmenorrhea: None  Last three months her menses has become irregular  Previously her menses were monthly  Over the last few months they have been occurring every 2 weeks  She will have heavy bleeding for 3 days, denies any pain with bleeding   Denies any change in diet, exercise, or medications  She does state she has diabetes and gets frequent yeast infections  Pt unsure if hot flashes or night sweats due to diabetes  She does wake up soaked  OB History    G 1 P 1   Contraception: None  Practices monthly SBEs, no breast complaints today  Last Mammogram 2017 normal per patient, has rx for mammogram  Denies any bowel or bladder issues  Pt follows with PCP for regular check-ups and blood work  Review of Systems   Genitourinary: Positive for menstrual problem  All other systems reviewed and are negative        The following portions of the patient's history were reviewed and updated as appropriate: allergies, current medications, past family history, past medical history, past social history, past surgical history and problem list     Past Medical History:   Diagnosis Date    Acute gastritis     [id number] RESOLVED    Breast pain     [id number] RESOLVED    Diabetes ([facility] [address] )     MELLITUS    Viral gastroenteritis     [id number] RESOLVED       Past Surgical History:   Procedure Laterality Date    HAND SURGERY      SKIN BIOPSY         Family History   Problem Relation Age of Onset    Hypertension Mother     Breast cancer Family     Colon cancer Family     Diabetes Family         MELLITUS    Lung cancer Family     Mental illness Neg Hx     Substance Abuse Neg Hx        Social History     Socioeconomic History    Marital status: /Civil Union     Spouse name: Not on file    Number of children: Not on file    Years of education: Not on file    Highest education level: Not on file   Occupational History    Not on file   Tobacco Use    Smoking status: Current Every Day Smoker    Smokeless tobacco: Never Used   Substance and Sexual Activity    Alcohol use: Not Currently     Comment: 2-3 drinks     Drug use: No    Sexual activity: Yes     Partners: Male     Birth control/protection: None   Other Topics Concern    Not on file   Social History Narrative    Not on file     Social Determinants of Health     Financial Resource Strain:     Difficulty of Paying Living Expenses:    Food Insecurity:     Worried About Running Out of Food in the Last Year:     [address] in the Last Year:    Transportation Needs:     Lack of Transportation (Medical):      Lack of Transportation (Non-Medical):    Physical Activity:     Days of Exercise per Week:     Minutes of Exercise per Session:    Stress:     Feeling of Stress :    Social Connections:     Frequency of Communication with Friends and Family:     Frequency of Social Gatherings with Friends and Family:     Attends [employer] Services:     Active Member of Clubs or Organizations:     Attends Club or Organization Meetings:     Marital Status:    Intimate Partner Violence:     Fear of Current or Ex-Partner:     Emotionally Abused:     Physically Abused:     Sexually Abused:          Current Outpatient Medications:     Dapagliflozin-metFORMIN HCl ER (Xigduo XR) 5-500 MG TB24, Take 1 tablet by mouth, Disp: , Rfl:     albuterol (PROVENTIL HFA,VENTOLIN HFA) 90 mcg/act inhaler, Inhale 2 puffs every 6 (six) hours as needed for wheezing or shortness of breath, Disp: 1 Inhaler, Rfl: 1    ALPRAZolam (XANAX) 0 25 mg tablet, Take 1 tablet (0 25 mg total) by mouth daily at bedtime as needed for anxiety, Disp: 30 tablet, Rfl: 0    Continuous Blood Gluc  (FreeStyle Melvin 14 Day [place]) JAVAN, 1 kit every 14 (fourteen) days, Disp: , Rfl:     Continuous Blood Gluc Sensor (FreeStyle Melvin 14 Day Sensor) MISC, Inject 1 kit under the skin every 14 (fourteen) days, Disp: , Rfl:     ergocalciferol (VITAMIN D2) 50,000 units, 50,000 Units once a week , Disp: , Rfl:     escitalopram (LEXAPRO) 10 mg tablet, take 1 tablet by mouth once daily, Disp: 90 tablet, Rfl: 1    gabapentin (NEURONTIN) 800 mg tablet, Take 1 tablet (800 mg total) by mouth 3 (three) times a day, Disp: 90 tablet, Rfl: 1   HumaLOG KwikPen 100 units/mL injection pen, INJECT 4 UNITS UNDER THE SKIN THREE TIMES A DAY BEFORE MEALS, Disp: , Rfl:     insulin glargine (Lantus SoloStar) 100 units/mL injection pen, Inject 10 Units under the skin daily , Disp: , Rfl:     insulin lispro (HumaLOG) 100 units/mL injection pen, Inject 4 Units under the skin, Disp: , Rfl:     linaGLIPtin (Tradjenta) 5 MG TABS, Take 5 mg by mouth daily Tradjenta 1 tablet daily, Disp: , Rfl:     Multiple Vitamin (DAILY VALUE MULTIVITAMIN) TABS, Take by mouth daily , Disp: , Rfl:     OneTouch Ultra test strip, use 1 TEST STRIP to TEST BLOOD SUGAR twice a day, Disp: , Rfl:     ramipril (ALTACE) 2 5 mg capsule, take 1 capsule by mouth once daily, Disp: 90 capsule, Rfl: 0    repaglinide (PRANDIN) 2 mg tablet, Take 2 mg by mouth 3 (three) times a day before meals , Disp: , Rfl:     rosuvastatin (CRESTOR) 5 mg tablet, Take 5 mg by mouth daily, Disp: , Rfl:     No Known Allergies    Objective   Vitals:    08/03/21 0816   BP: 120/80   Temp: 97 6 °F (36 4 °C)   Weight: 79 4 kg (175 lb)   Height: 5' 2" (1 575 m)     Physical Exam  Vitals and nursing note reviewed  Constitutional:       Appearance: She is well-developed  HENT:      Head: Normocephalic  Neck:      Thyroid: No thyromegaly  Trachea: No tracheal deviation  Cardiovascular:      Rate and Rhythm: Normal rate and regular rhythm  Heart sounds: Normal heart sounds  Pulmonary:      Effort: Pulmonary effort is normal       Breath sounds: Normal breath sounds  Chest:      Breasts: Breasts are symmetrical          Right: No inverted nipple, mass, nipple discharge, skin change or tenderness  Left: No inverted nipple, mass, nipple discharge, skin change or tenderness  Abdominal:      General: Bowel sounds are normal  There is no distension  Palpations: Abdomen is soft  There is no mass  Tenderness: There is no abdominal tenderness  There is no guarding or rebound  Genitourinary:     Labia:         Right: No rash, tenderness, lesion or injury  Left: No rash, tenderness, lesion or injury  Vagina: Normal       Cervix: Normal       Uterus: Normal        Adnexa: Right adnexa normal and left adnexa normal         Right: No mass, tenderness or fullness  Left: No mass, tenderness or fullness  Musculoskeletal:         General: Normal range of motion  Cervical back: Normal range of motion and neck supple  Skin:     General: Skin is warm and dry  Neurological:      Mental Status: She is alert and oriented to person, place, and time  Psychiatric:         Behavior: Behavior normal          Thought Content:  Thought content normal          Judgment: Judgment normal

## 2021-08-06 LAB
CYTOLOGIST CVX/VAG CYTO: NORMAL
DX ICD CODE: NORMAL
HPV I/H RISK 4 DNA CVX QL PROBE+SIG AMP: NEGATIVE
Lab: NORMAL
OTHER STN SPEC: NORMAL
PATH REPORT.FINAL DX SPEC: NORMAL
SL AMB NOTE:: NORMAL
SL AMB SPECIMEN ADEQUACY: NORMAL
SL AMB TEST METHODOLOGY: NORMAL

## 2021-08-11 ENCOUNTER — APPOINTMENT (OUTPATIENT)
Dept: RADIOLOGY | Facility: CLINIC | Age: 46
End: 2021-08-11
Payer: COMMERCIAL

## 2021-08-11 ENCOUNTER — OFFICE VISIT (OUTPATIENT)
Dept: OBGYN CLINIC | Facility: CLINIC | Age: 46
End: 2021-08-11
Payer: COMMERCIAL

## 2021-08-11 VITALS
DIASTOLIC BLOOD PRESSURE: 86 MMHG | HEART RATE: 76 BPM | SYSTOLIC BLOOD PRESSURE: 126 MMHG | BODY MASS INDEX: 32.01 KG/M2 | WEIGHT: 175 LBS

## 2021-08-11 DIAGNOSIS — Z01.89 ENCOUNTER FOR LOWER EXTREMITY COMPARISON IMAGING STUDY: ICD-10-CM

## 2021-08-11 DIAGNOSIS — M25.561 ACUTE PAIN OF RIGHT KNEE: Primary | ICD-10-CM

## 2021-08-11 DIAGNOSIS — M23.91 INTERNAL DERANGEMENT OF KNEE, RIGHT: ICD-10-CM

## 2021-08-11 DIAGNOSIS — M25.561 RIGHT KNEE PAIN, UNSPECIFIED CHRONICITY: ICD-10-CM

## 2021-08-11 PROCEDURE — 4004F PT TOBACCO SCREEN RCVD TLK: CPT | Performed by: ORTHOPAEDIC SURGERY

## 2021-08-11 PROCEDURE — 73560 X-RAY EXAM OF KNEE 1 OR 2: CPT

## 2021-08-11 PROCEDURE — 73562 X-RAY EXAM OF KNEE 3: CPT

## 2021-08-11 PROCEDURE — 99203 OFFICE O/P NEW LOW 30 MIN: CPT | Performed by: ORTHOPAEDIC SURGERY

## 2021-08-11 NOTE — PROGRESS NOTES
Assessment/Plan:  1  Acute pain of right knee  XR knee 3 vw right non injury    MRI knee right  wo contrast   2  Internal derangement of knee, right  MRI knee right  wo contrast     Scribe Attestation    I,:  Hildale Second am acting as a scribe while in the presence of the attending physician :       I,:  Marcella Santos, DO personally performed the services described in this documentation    as scribed in my presence :         Iliana De La Torre is a pleasant 40year old who presents to the office today for an initial evaluation of her right knee  X-rays were reviewed with the patient at today's visit which demonstrates no acute fractures  I discussed with the patient that I recommend an MRI at this time to further evaluate her right knee since her pain has been ongoing for 2 months  A right knee MRI without contrast was ordered at today's visit  I discussed with the patient that I would like her to refrain from any type of yoga or Pilate's until the MRI is completed  I discussed with the patient that she may use Tylenol 650 mg 2 tablets every 8 hours as needed for pain relief  I will call the patient with the results of the MRI  She understood and had no further questions  Subjective: Initial Evaluation Right Knee    Patient ID: Mikie Martinez is a 40 y o  female who presents to the office today for an initial evaluation of her right knee  She states that about 2 months ago, she was at [address] when she slipped and fell directed on her flexed right anterior knee  She states that she experiences a dull/achy pain into the anterior aspect of her right knee  She states that she will experience episodes of instability while ambulating up and down stairs  She states that she participates in yoga and pilate's and is unable to get down on her right knee  She states that she has tried rest, Tylenol or and Gabapetnin with minimal pain relief  She denies any previous injuries      Review of Systems   Constitutional: Positive for activity change  HENT: Negative  Eyes: Negative  Respiratory: Negative  Cardiovascular: Negative  Gastrointestinal: Negative  Endocrine: Negative  Genitourinary: Negative  Musculoskeletal: Positive for joint swelling and myalgias  Skin: Negative  Allergic/Immunologic: Negative  Neurological: Negative  Hematological: Negative  Psychiatric/Behavioral: Negative            Past Medical History:   Diagnosis Date    Acute gastritis     [id number] RESOLVED    Breast pain     [id number] RESOLVED    Diabetes ([address] )     MELLITUS    Viral gastroenteritis     [id number] RESOLVED       Past Surgical History:   Procedure Laterality Date    HAND SURGERY      SKIN BIOPSY         Family History   Problem Relation Age of Onset    Hypertension Mother     Breast cancer Family     Colon cancer Family     Diabetes Family         MELLITUS    Lung cancer Family     Mental illness Neg Hx     Substance Abuse Neg Hx        Social History     Occupational History    Not on file   Tobacco Use    Smoking status: Current Every Day Smoker    Smokeless tobacco: Never Used   Vaping Use    Vaping Use: Never used   Substance and Sexual Activity    Alcohol use: Not Currently     Comment: 2-3 drinks     Drug use: No    Sexual activity: Yes     Partners: Male     Birth control/protection: None         Current Outpatient Medications:     albuterol (PROVENTIL HFA,VENTOLIN HFA) 90 mcg/act inhaler, Inhale 2 puffs every 6 (six) hours as needed for wheezing or shortness of breath, Disp: 1 Inhaler, Rfl: 1    ALPRAZolam (XANAX) 0 25 mg tablet, Take 1 tablet (0 25 mg total) by mouth daily at bedtime as needed for anxiety, Disp: 30 tablet, Rfl: 0    Continuous Blood Gluc  (FreeStyle Melvin 14 Day [place]) JAVAN, 1 kit every 14 (fourteen) days, Disp: , Rfl:     Continuous Blood Gluc Sensor (FreeStyle Melvin 14 Day Sensor) MISC, Inject 1 kit under the skin every 14 (fourteen) days, Disp: , Rfl:    Dapagliflozin-metFORMIN HCl ER (Xigduo XR) 5-500 MG TB24, Take 1 tablet by mouth, Disp: , Rfl:     ergocalciferol (VITAMIN D2) 50,000 units, 50,000 Units once a week , Disp: , Rfl:     escitalopram (LEXAPRO) 10 mg tablet, take 1 tablet by mouth once daily, Disp: 90 tablet, Rfl: 1    gabapentin (NEURONTIN) 800 mg tablet, Take 1 tablet (800 mg total) by mouth 3 (three) times a day, Disp: 90 tablet, Rfl: 1    HumaLOG KwikPen 100 units/mL injection pen, INJECT 4 UNITS UNDER THE SKIN THREE TIMES A DAY BEFORE MEALS, Disp: , Rfl:     insulin glargine (Lantus SoloStar) 100 units/mL injection pen, Inject 10 Units under the skin daily  (Patient not taking: Reported on 8/11/2021), Disp: , Rfl:     insulin lispro (HumaLOG) 100 units/mL injection pen, Inject 4 Units under the skin, Disp: , Rfl:     linaGLIPtin (Tradjenta) 5 MG TABS, Take 5 mg by mouth daily Tradjenta 1 tablet daily, Disp: , Rfl:     Multiple Vitamin (DAILY VALUE MULTIVITAMIN) TABS, Take by mouth daily , Disp: , Rfl:     OneTouch Ultra test strip, use 1 TEST STRIP to TEST BLOOD SUGAR twice a day, Disp: , Rfl:     ramipril (ALTACE) 2 5 mg capsule, take 1 capsule by mouth once daily, Disp: 90 capsule, Rfl: 0    repaglinide (PRANDIN) 2 mg tablet, Take 2 mg by mouth 3 (three) times a day before meals , Disp: , Rfl:     rosuvastatin (CRESTOR) 5 mg tablet, Take 5 mg by mouth daily, Disp: , Rfl:     No Known Allergies    Objective:  Vitals:    08/11/21 1357   BP: 126/86   Pulse: 76       Body mass index is 32 01 kg/m²  Right Knee Exam     Tenderness   The patient is experiencing tenderness in the patellar tendon (inferior aspect of patella )      Range of Motion   Extension: 0   Flexion: 120     Tests   Varus: negative Valgus: negative    Other   Erythema: absent  Scars: absent  Sensation: normal  Pulse: present  Swelling: none  Effusion: effusion (Scant) present    Comments:  Positive Patellarfemoral Grind          Observations     Right Knee   Positive for effusion (Scant)  Physical Exam  Vitals reviewed  Constitutional:       Appearance: Normal appearance  She is well-developed  HENT:      Head: Normocephalic and atraumatic  Eyes:      General:         Right eye: No discharge  Left eye: No discharge  Extraocular Movements: Extraocular movements intact  Conjunctiva/sclera: Conjunctivae normal    Cardiovascular:      Rate and Rhythm: Normal rate  Pulmonary:      Effort: Pulmonary effort is normal  No respiratory distress  Musculoskeletal:      Cervical back: Normal range of motion and neck supple  Right knee: Effusion (Scant) present  Skin:     General: Skin is warm and dry  Neurological:      General: No focal deficit present  Mental Status: She is alert and oriented to person, place, and time  Psychiatric:         Mood and Affect: Mood normal          Behavior: Behavior normal          I have personally reviewed pertinent films in PACS  X-rays performed in the office today for right knee demonstrates no acute fractures or dislocations appreciated

## 2021-08-16 ENCOUNTER — TELEPHONE (OUTPATIENT)
Dept: OBGYN CLINIC | Facility: CLINIC | Age: 46
End: 2021-08-16

## 2021-08-16 DIAGNOSIS — B37.9 YEAST INFECTION: Primary | ICD-10-CM

## 2021-08-16 RX ORDER — FLUCONAZOLE 150 MG/1
150 TABLET ORAL ONCE
Qty: 1 TABLET | Refills: 1 | Status: SHIPPED | OUTPATIENT
Start: 2021-08-16 | End: 2021-08-16

## 2021-08-16 NOTE — TELEPHONE ENCOUNTER
Pt called stating she recently has been seen in office, discussed that she is diabetic and gets frequent yeast infections   She has been having discharge and has an odor since Saturday and also states the cream is not helping and would like antibiotics sent over to pharmacy for yeast infection

## 2021-08-18 NOTE — TELEPHONE ENCOUNTER
Tried calling pt again and got her voicemail  Left detailed message for the pt and advised to call if any further questions or concerns

## 2021-08-19 ENCOUNTER — TELEPHONE (OUTPATIENT)
Dept: PAIN MEDICINE | Facility: CLINIC | Age: 46
End: 2021-08-19

## 2021-08-19 DIAGNOSIS — R20.2 PARESTHESIA: ICD-10-CM

## 2021-08-19 RX ORDER — GABAPENTIN 800 MG/1
800 TABLET ORAL 3 TIMES DAILY
Qty: 90 TABLET | Refills: 2 | Status: SHIPPED | OUTPATIENT
Start: 2021-08-19 | End: 2021-12-02 | Stop reason: SDUPTHER

## 2021-08-19 NOTE — TELEPHONE ENCOUNTER
S/w pt regarding refill request  Pt confirmed she is taking gabapentin TID most days, pt states she forgets sometimes  Denies s/e and states it works very well for symptoms  LP 5/21 #90 one refill  Pt uses [facility] on file  Please advise, thank you

## 2021-08-19 NOTE — TELEPHONE ENCOUNTER
Pt contacted Call Center requested refill of their medication  Medication Name:gabapentin (NEURONTIN        Dosage of Med: 800 mg       Frequency of Med:Take 1 tablet (800 mg total) by mouth 3 (three) times a day      Remaining Medication: 0      Pharmacy and Location:  [address] ([address], [address] [facility] ([address] [place] [address])   [address] [address] [place] [address] ([address] [place] [address]), [place] [address]   Phone:  [phone]        Pt  Preferred Callback Phone Number: [phone]      Thank you

## 2021-08-22 ENCOUNTER — OFFICE VISIT (OUTPATIENT)
Dept: URGENT CARE | Facility: CLINIC | Age: 46
End: 2021-08-22
Payer: COMMERCIAL

## 2021-08-22 ENCOUNTER — APPOINTMENT (OUTPATIENT)
Dept: RADIOLOGY | Facility: CLINIC | Age: 46
End: 2021-08-22
Payer: COMMERCIAL

## 2021-08-22 VITALS
WEIGHT: 171 LBS | TEMPERATURE: 97.5 F | DIASTOLIC BLOOD PRESSURE: 64 MMHG | SYSTOLIC BLOOD PRESSURE: 98 MMHG | HEART RATE: 72 BPM | OXYGEN SATURATION: 100 % | BODY MASS INDEX: 31.28 KG/M2 | RESPIRATION RATE: 16 BRPM

## 2021-08-22 DIAGNOSIS — T14.90XA INJURY: ICD-10-CM

## 2021-08-22 DIAGNOSIS — S93.601A FOOT SPRAIN, RIGHT, INITIAL ENCOUNTER: Primary | ICD-10-CM

## 2021-08-22 PROCEDURE — 99213 OFFICE O/P EST LOW 20 MIN: CPT | Performed by: PHYSICIAN ASSISTANT

## 2021-08-22 PROCEDURE — 73650 X-RAY EXAM OF HEEL: CPT

## 2021-08-22 NOTE — PATIENT INSTRUCTIONS
Xray today looked negative for fracture or dislocation  Placed in a post op shoe for support  Can wear shoe while ambulating  When home rest, ice and elevate the foot  Can take ibuprofen or tylenol for the pain  If pain persists for more than 2 weeks follow up with primary care doctor for further evaluation  If symptoms worsen can proceed to the ER

## 2021-08-24 NOTE — PROGRESS NOTES
[address]        NAME: Mikie Martinez is a 40 y o  female  : 1975    MRN: [id number]  DATE: 2021  TIME: 5:17 PM    Assessment and Plan   Foot sprain, right, initial encounter [B37 601A]  1  Foot sprain, right, initial encounter  XR heel / calcaneus 2+ vw right         Patient Instructions     Patient Instructions   Xray today looked negative for fracture or dislocation  Placed in a post op shoe for support  Can wear shoe while ambulating  When home rest, ice and elevate the foot  Can take ibuprofen or tylenol for the pain  If pain persists for more than 2 weeks follow up with primary care doctor for further evaluation  If symptoms worsen can proceed to the ER  Follow up with PCP in 3-5 days  Proceed to  ER if symptoms worsen  Chief Complaint     Chief Complaint   Patient presents with    Injury     pt presents with injury of the right heel r/t falling off ladder last night         History of Present Illness         32-year-old female with a history of neuropathy presents for right-sided heel pain after falling off a 4 ft high ladder yesterday  Patient notes she landed directly on the heel  It has been painful ever since  She is concerned because typically she does not have much feeling in her foot but she knows it is painful because she can actually feel the injury  Patient denies any numbness or tingling associated with the injury  There is no ankle or back pain  She did not hit her head when she fell  Resting makes the pain better  Walking makes the pain worse  Review of Systems   Review of Systems   Constitutional: Negative for fever  Eyes: Negative for visual disturbance  Respiratory: Negative for cough and shortness of breath  Cardiovascular: Negative for chest pain and palpitations  Gastrointestinal: Negative for nausea and vomiting  Musculoskeletal: Positive for arthralgias (right heel) and gait problem (Antalgic)   Negative for back pain and myalgias  Skin: Negative for color change and wound  Neurological: Negative for dizziness and headaches           Current Medications       Current Outpatient Medications:     albuterol (PROVENTIL HFA,VENTOLIN HFA) 90 mcg/act inhaler, Inhale 2 puffs every 6 (six) hours as needed for wheezing or shortness of breath, Disp: 1 Inhaler, Rfl: 1    ALPRAZolam (XANAX) 0 25 mg tablet, Take 1 tablet (0 25 mg total) by mouth daily at bedtime as needed for anxiety, Disp: 30 tablet, Rfl: 0    Continuous Blood Gluc  (FreeStyle Melvin 14 Day [place]) JAVAN, 1 kit every 14 (fourteen) days, Disp: , Rfl:     Dapagliflozin-metFORMIN HCl ER (Xigduo XR) 5-500 MG TB24, Take 1 tablet by mouth, Disp: , Rfl:     ergocalciferol (VITAMIN D2) 50,000 units, 50,000 Units once a week , Disp: , Rfl:     escitalopram (LEXAPRO) 10 mg tablet, take 1 tablet by mouth once daily, Disp: 90 tablet, Rfl: 1    gabapentin (NEURONTIN) 800 mg tablet, Take 1 tablet (800 mg total) by mouth 3 (three) times a day, Disp: 90 tablet, Rfl: 2    insulin glargine (Lantus SoloStar) 100 units/mL injection pen, Inject 10 Units under the skin daily , Disp: , Rfl:     linaGLIPtin (Tradjenta) 5 MG TABS, Take 5 mg by mouth daily Tradjenta 1 tablet daily, Disp: , Rfl:     Multiple Vitamin (DAILY VALUE MULTIVITAMIN) TABS, Take by mouth daily , Disp: , Rfl:     OneTouch Ultra test strip, use 1 TEST STRIP to TEST BLOOD SUGAR twice a day, Disp: , Rfl:     ramipril (ALTACE) 2 5 mg capsule, take 1 capsule by mouth once daily, Disp: 90 capsule, Rfl: 0    repaglinide (PRANDIN) 2 mg tablet, Take 2 mg by mouth 3 (three) times a day before meals , Disp: , Rfl:     rosuvastatin (CRESTOR) 5 mg tablet, Take 5 mg by mouth daily, Disp: , Rfl:     Continuous Blood Gluc Sensor (FreeStyle Melvin 14 Day Sensor) MISC, Inject 1 kit under the skin every 14 (fourteen) days (Patient not taking: Reported on 8/22/2021), Disp: , Rfl:     Current Allergies     Allergies as of 08/22/2021    (No Known Allergies)            The following portions of the patient's history were reviewed and updated as appropriate: allergies, current medications, past family history, past medical history, past social history, past surgical history and problem list      Past Medical History:   Diagnosis Date    Acute gastritis     [id number] RESOLVED    Allergic     Asthma     Breast pain     [id number] RESOLVED    Diabetes ([address] )     MELLITUS    Diabetes mellitus [facility] [address]     Viral gastroenteritis     [id number] RESOLVED       Past Surgical History:   Procedure Laterality Date    HAND SURGERY      SKIN BIOPSY         Family History   Problem Relation Age of Onset    Hypertension Mother     Breast cancer Family     Colon cancer Family     Diabetes Family         MELLITUS    Lung cancer Family     Mental illness Neg Hx     Substance Abuse Neg Hx          Medications have been verified  Objective   BP 98/64   Pulse 72   Temp 97 5 °F (36 4 °C)   Resp 16   Wt 77 6 kg (171 lb)   SpO2 100%   BMI 31 28 kg/m²        Physical Exam     Physical Exam  Vitals and nursing note reviewed  Constitutional:       Appearance: Normal appearance  HENT:      Head: Normocephalic and atraumatic  Eyes:      Extraocular Movements: Extraocular movements intact  Pupils: Pupils are equal, round, and reactive to light  Cardiovascular:      Rate and Rhythm: Normal rate and regular rhythm  Pulses: Normal pulses  Heart sounds: Normal heart sounds  Pulmonary:      Effort: Pulmonary effort is normal       Breath sounds: Normal breath sounds  Musculoskeletal:         General: Tenderness (plantar heel pain) present  No swelling or signs of injury  Normal range of motion  Left foot: Abnormal pulse: baseline per pt d/t neuropathy  Skin:     Findings: No bruising  Neurological:      Mental Status: She is alert and oriented to person, place, and time

## 2021-09-03 ENCOUNTER — HOSPITAL ENCOUNTER (OUTPATIENT)
Dept: RADIOLOGY | Facility: HOSPITAL | Age: 46
Discharge: HOME | End: 2021-09-03
Attending: ORTHOPAEDIC SURGERY
Payer: COMMERCIAL

## 2021-09-03 DIAGNOSIS — M25.561 ACUTE PAIN OF RIGHT KNEE: ICD-10-CM

## 2021-09-03 DIAGNOSIS — M23.91 INTERNAL DERANGEMENT OF KNEE, RIGHT: ICD-10-CM

## 2021-09-03 PROCEDURE — 73721 MRI JNT OF LWR EXTRE W/O DYE: CPT

## 2021-09-03 PROCEDURE — G1004 CDSM NDSC: HCPCS

## 2021-09-10 ENCOUNTER — TELEPHONE (OUTPATIENT)
Dept: OBGYN CLINIC | Facility: CLINIC | Age: 46
End: 2021-09-10

## 2021-09-10 NOTE — TELEPHONE ENCOUNTER
lvm for patient to contact office to discuss  Any medical assistant/nurse can help her    ----- Message from Bruno Dietz PA-C sent at 9/10/2021 10:49 AM EDT -----  Please let  patient know that her MRI did not show any obvious surgical pathology  She does have patellar tendinitis for which we would recommend conservative treatment with physical therapy  If she is still symptomatic, we can provide her with a PT referral or bring her back in for a clinical re-evaluation if her symptoms have changed   Thanks      ----- Message -----  From: Jaiden, Radiology Results In  Sent: 9/7/2021  11:54 AM EDT  To: Marcella Santos DO

## 2021-09-14 NOTE — TELEPHONE ENCOUNTER
3rd attempt to contact patient advised this is our last attempt for us to contact her  Advised to call us back for MRI results

## 2021-09-14 NOTE — TELEPHONE ENCOUNTER
Patient was advised of recommendations  She deferred formal PT at this time  She wants to know if she is okay to resume yoga?       Can leave a detailed vm if she does not answer

## 2021-12-01 ENCOUNTER — TELEMEDICINE (OUTPATIENT)
Dept: FAMILY MEDICINE CLINIC | Facility: CLINIC | Age: 46
End: 2021-12-01
Payer: COMMERCIAL

## 2021-12-01 DIAGNOSIS — IMO0002 DIABETES MELLITUS WITH NEUROLOGICAL MANIFESTATIONS, UNCONTROLLED: ICD-10-CM

## 2021-12-01 DIAGNOSIS — U07.1 COVID-19 VIRUS INFECTION: Primary | ICD-10-CM

## 2021-12-01 PROCEDURE — 99213 OFFICE O/P EST LOW 20 MIN: CPT | Performed by: NURSE PRACTITIONER

## 2021-12-01 RX ORDER — SODIUM CHLORIDE 9 MG/ML
20 INJECTION, SOLUTION INTRAVENOUS ONCE
Status: CANCELLED | OUTPATIENT
Start: 2021-12-01

## 2021-12-01 RX ORDER — ACETAMINOPHEN 325 MG/1
650 TABLET ORAL ONCE AS NEEDED
Status: CANCELLED | OUTPATIENT
Start: 2021-12-01

## 2021-12-01 RX ORDER — ALBUTEROL SULFATE 90 UG/1
3 AEROSOL, METERED RESPIRATORY (INHALATION) ONCE AS NEEDED
Status: CANCELLED | OUTPATIENT
Start: 2021-12-01

## 2021-12-01 RX ORDER — ONDANSETRON 2 MG/ML
4 INJECTION INTRAMUSCULAR; INTRAVENOUS ONCE AS NEEDED
Status: CANCELLED | OUTPATIENT
Start: 2021-12-01

## 2021-12-02 DIAGNOSIS — R20.2 PARESTHESIA: ICD-10-CM

## 2021-12-02 RX ORDER — GABAPENTIN 800 MG/1
800 TABLET ORAL 3 TIMES DAILY
Qty: 90 TABLET | Refills: 5 | Status: SHIPPED | OUTPATIENT
Start: 2021-12-02 | End: 2022-06-30 | Stop reason: SDUPTHER

## 2021-12-03 ENCOUNTER — HOSPITAL ENCOUNTER (OUTPATIENT)
Dept: INFUSION CENTER | Facility: HOSPITAL | Age: 46
Discharge: HOME | End: 2021-12-03
Payer: COMMERCIAL

## 2021-12-03 VITALS
RESPIRATION RATE: 20 BRPM | SYSTOLIC BLOOD PRESSURE: 130 MMHG | DIASTOLIC BLOOD PRESSURE: 61 MMHG | OXYGEN SATURATION: 99 % | TEMPERATURE: 98.9 F | HEART RATE: 88 BPM

## 2021-12-03 DIAGNOSIS — U07.1 COVID-19 VIRUS INFECTION: Primary | ICD-10-CM

## 2021-12-03 PROCEDURE — M0245 HB BAMLAN AND ETESEV INF ADMIN: HCPCS | Performed by: FAMILY MEDICINE

## 2021-12-03 RX ORDER — ACETAMINOPHEN 325 MG/1
650 TABLET ORAL ONCE AS NEEDED
Status: DISCONTINUED | OUTPATIENT
Start: 2021-12-03 | End: 2021-12-06 | Stop reason: HOSPADM

## 2021-12-03 RX ORDER — ONDANSETRON 2 MG/ML
4 INJECTION INTRAMUSCULAR; INTRAVENOUS ONCE AS NEEDED
Status: CANCELLED | OUTPATIENT
Start: 2021-12-03

## 2021-12-03 RX ORDER — ALBUTEROL SULFATE 90 UG/1
3 AEROSOL, METERED RESPIRATORY (INHALATION) ONCE AS NEEDED
Status: DISCONTINUED | OUTPATIENT
Start: 2021-12-03 | End: 2021-12-06 | Stop reason: HOSPADM

## 2021-12-03 RX ORDER — ACETAMINOPHEN 325 MG/1
650 TABLET ORAL ONCE AS NEEDED
Status: CANCELLED | OUTPATIENT
Start: 2021-12-03

## 2021-12-03 RX ORDER — ALBUTEROL SULFATE 90 UG/1
3 AEROSOL, METERED RESPIRATORY (INHALATION) ONCE AS NEEDED
Status: CANCELLED | OUTPATIENT
Start: 2021-12-03

## 2021-12-03 RX ORDER — ONDANSETRON 2 MG/ML
4 INJECTION INTRAMUSCULAR; INTRAVENOUS ONCE AS NEEDED
Status: DISCONTINUED | OUTPATIENT
Start: 2021-12-03 | End: 2021-12-06 | Stop reason: HOSPADM

## 2021-12-03 RX ORDER — SODIUM CHLORIDE 9 MG/ML
20 INJECTION, SOLUTION INTRAVENOUS ONCE
Status: COMPLETED | OUTPATIENT
Start: 2021-12-03 | End: 2021-12-03

## 2021-12-03 RX ORDER — SODIUM CHLORIDE 9 MG/ML
20 INJECTION, SOLUTION INTRAVENOUS ONCE
Status: CANCELLED | OUTPATIENT
Start: 2021-12-03

## 2021-12-03 RX ADMIN — SODIUM CHLORIDE 20 ML/HR: 9 INJECTION, SOLUTION INTRAVENOUS at 08:15

## 2021-12-03 RX ADMIN — SODIUM CHLORIDE 2100 MG COMBINED: 9 INJECTION, SOLUTION INTRAVENOUS at 08:38

## 2021-12-09 ENCOUNTER — TELEMEDICINE (OUTPATIENT)
Dept: FAMILY MEDICINE CLINIC | Facility: CLINIC | Age: 46
End: 2021-12-09
Payer: COMMERCIAL

## 2021-12-09 DIAGNOSIS — U07.1 COVID-19 VIRUS INFECTION: Primary | ICD-10-CM

## 2021-12-09 PROCEDURE — 99213 OFFICE O/P EST LOW 20 MIN: CPT | Performed by: NURSE PRACTITIONER

## 2021-12-09 PROCEDURE — 4004F PT TOBACCO SCREEN RCVD TLK: CPT | Performed by: NURSE PRACTITIONER

## 2022-04-07 ENCOUNTER — TELEPHONE (OUTPATIENT)
Dept: PAIN MEDICINE | Facility: CLINIC | Age: 47
End: 2022-04-07

## 2022-04-07 NOTE — TELEPHONE ENCOUNTER
Patient is requesting a transfer of care from Dr Lucia Walsh to Dr Farhat Morales due to her living in [place], [place] & it more convenient for her  I sent an in basket to Dr Abel Perez clerical pool &  with update

## 2022-04-07 NOTE — TELEPHONE ENCOUNTER
Received: Today  MD Petrona Medina  Cc: [id number]  Phone Number: [phone]     Sure

## 2022-04-07 NOTE — TELEPHONE ENCOUNTER
FW: REESE  Received:  Today  David Corona MD  Cc: [facility]; [address]  Phone Number: [phone]     Dr John Whaley has released patient to you, will you accept?             Previous Messages       ----- Message -----   From: Moe Gaviria DO   Sent: 4/7/2022   1:24 PM EDT   To: Annabelle Browne, *   Subject: RE: ALEXX VENTURA                                           Okay with me   Thank you

## 2022-04-08 ENCOUNTER — OFFICE VISIT (OUTPATIENT)
Dept: ENDOCRINOLOGY | Facility: CLINIC | Age: 47
End: 2022-04-08
Payer: COMMERCIAL

## 2022-04-08 VITALS
SYSTOLIC BLOOD PRESSURE: 110 MMHG | BODY MASS INDEX: 27.6 KG/M2 | HEIGHT: 62 IN | WEIGHT: 150 LBS | DIASTOLIC BLOOD PRESSURE: 70 MMHG | HEART RATE: 84 BPM

## 2022-04-08 DIAGNOSIS — E66.3 OVERWEIGHT (BMI 25.0-29.9): ICD-10-CM

## 2022-04-08 DIAGNOSIS — I10 PRIMARY HYPERTENSION: ICD-10-CM

## 2022-04-08 DIAGNOSIS — K31.84 GASTROPARESIS DUE TO DM (HCC): ICD-10-CM

## 2022-04-08 DIAGNOSIS — E11.42 DIABETIC POLYNEUROPATHY ASSOCIATED WITH TYPE 2 DIABETES MELLITUS (HCC): Primary | ICD-10-CM

## 2022-04-08 DIAGNOSIS — F17.210 CIGARETTE NICOTINE DEPENDENCE WITHOUT COMPLICATION: ICD-10-CM

## 2022-04-08 DIAGNOSIS — E11.43 GASTROPARESIS DUE TO DM (HCC): ICD-10-CM

## 2022-04-08 DIAGNOSIS — R20.8 BURNING SENSATION: ICD-10-CM

## 2022-04-08 PROCEDURE — 99205 OFFICE O/P NEW HI 60 MIN: CPT | Performed by: INTERNAL MEDICINE

## 2022-04-08 RX ORDER — BLOOD SUGAR DIAGNOSTIC
1 STRIP MISCELLANEOUS 2 TIMES DAILY
Qty: 180 STRIP | Refills: 3 | Status: SHIPPED | OUTPATIENT
Start: 2022-04-08

## 2022-04-08 NOTE — PROGRESS NOTES
Mago Mamadou  56 y o   04/08/22      CC: type 2 DM - New patient    Assessment and Plan     Problem List Items Addressed This Visit        Digestive    Gastroparesis due to DM [facility])       Endocrine    Diabetes mellitus with polyneuropathy ([facility] [address] ) - Primary    Relevant Medications    OneTouch Ultra test strip    Other Relevant Orders    Comprehensive metabolic panel    Hemoglobin A1C With EAG    Lipid panel    Microalbumin / creatinine urine ratio    TSH, 3rd generation with Free T4 reflex       Other    Nicotine dependence      Other Visit Diagnoses     Burning sensation        Relevant Orders    Vitamin B12    TSH, 3rd generation with Free T4 reflex        · At this time, more information is needed before medication adjustments should be made  Lab work ordered  Patient should be checking her blood sugars twice daily and marking down the time  Blank sheets of glucose monitoring logs given to patient  · Patient does not think that her GI symptoms are severe enough that she needs intervention at this time  If her symptoms worsen, she may benefit from a GI or nutrition consult  · Discussed smoking cessation for overall health benefits  · Patient has significant burning in her feet  Will check Vitamin B 12 to r/o deficiency that could be contributing to these symptoms   Patient understands and agrees with the plan  Plan discussed with attending- Dr Marilee Glover     Subjective:     Current diabetes regimen: Tradjenta 5 mg po daily, Xigduo 5-500 po daily, supposed to be on prandin 2 mg TID but having pharmacy was out of it so she has not been using this for a few months  Was briefly on insulin during the pandemic and had significant weight gain as well as hypoglycemic episodes (as low as 50)    Last hemoglobin A1C was 7 6 on 7/26/2021  Last microalbumin /creatinine ratio was 27 5 on 7/6/2020  Cr was 0 72 on 02/12/2021    Cannot remember when her last DM foot exam was   Last diabetic eye exam was over 2 years ago     Has history of diabetic gastroparesis but cannot remember if she ever had a formal study done  She states that her gastroparesis was the reason that she was taken off Victoza  Currently, she has symptoms including early satiety, nausea, and abdominal cramping 4x a week  She denies vomiting  She does not think this warrants medication at this time  Has significant bilateral pedal neuropathy for which she takes 800 mg gabapentin TID (prescribed by pain management)  Patient's glucose   Patient checks her sugars every other day - most of the time at night  Patient states that her blood sugars will drop to the 60s about once a month  Stopped taking Crestor 5mg last year - "I didn't think I needed it  My father was on a statin when he , I don't want to be on it "    Takes ramipril 2 5 mg po daily  Was previously on weekly vitamin D supplementation  Does not currently take this  Smokes <1 ppd, has smoked for 20 years  No plans to quit  Drinks alcohol rarely  Does not use illicit drugs  Review of Systems   Constitutional: Negative for chills and fever  Respiratory: Positive for cough (chronic from smoking)  Negative for shortness of breath and wheezing  Cardiovascular: Negative for chest pain and palpitations  Gastrointestinal: Positive for abdominal pain (after eating, 4x a week) and nausea (4x a week)  Negative for diarrhea and vomiting  Genitourinary: Negative for difficulty urinating and dysuria  Skin: Negative for rash and wound  Neurological: Positive for numbness (bilateral fet)  Negative for syncope and weakness          The following portions of the patient's history were reviewed and updated as appropriate:  current medications, past family history, past medical history, past social history, past surgical history and problem list     Current Outpatient Medications on File Prior to Visit   Medication Sig Dispense Refill    albuterol (Danya Mccartney HFA) 90 mcg/act inhaler Inhale 2 puffs every 6 (six) hours as needed for wheezing or shortness of breath 1 Inhaler 1    ALPRAZolam (XANAX) 0 25 mg tablet Take 1 tablet (0 25 mg total) by mouth daily at bedtime as needed for anxiety 30 tablet 0    Dapagliflozin-metFORMIN HCl ER (Xigduo XR) 5-500 MG TB24 Take 1 tablet by mouth      ergocalciferol (VITAMIN D2) 50,000 units 50,000 Units once a week       escitalopram (LEXAPRO) 10 mg tablet take 1 tablet by mouth once daily 90 tablet 1    gabapentin (NEURONTIN) 800 mg tablet Take 1 tablet (800 mg total) by mouth 3 (three) times a day 90 tablet 5    glucose blood test strip use 1 TEST STRIP to TEST BLOOD SUGAR twice a day      linaGLIPtin (Tradjenta) 5 MG TABS Take 5 mg by mouth daily Tradjenta 1 tablet daily      Multiple Vitamin (DAILY VALUE MULTIVITAMIN) TABS Take by mouth daily       OneTouch Ultra test strip use 1 TEST STRIP to TEST BLOOD SUGAR twice a day      ramipril (ALTACE) 2 5 mg capsule take 1 capsule by mouth once daily 90 capsule 0    repaglinide (PRANDIN) 2 mg tablet Take 2 mg by mouth 3 (three) times a day before meals  (Patient not taking: Reported on 4/8/2022 )      rosuvastatin (CRESTOR) 5 mg tablet Take 5 mg by mouth daily (Patient not taking: Reported on 4/8/2022 )       No current facility-administered medications on file prior to visit  Objective:    /70   Pulse 84   Ht 5' 2" (1 575 m)   Wt 68 kg (150 lb)   BMI 27 44 kg/m²     Physical Exam  Constitutional:       Appearance: Normal appearance  HENT:      Head: Normocephalic and atraumatic  Cardiovascular:      Rate and Rhythm: Normal rate and regular rhythm  Pulses: no weak pulses          Dorsalis pedis pulses are 1+ on the right side and 1+ on the left side  Posterior tibial pulses are 1+ on the right side and 1+ on the left side  Pulmonary:      Effort: Pulmonary effort is normal  No respiratory distress  Breath sounds: No wheezing  Musculoskeletal:      Right lower leg: No edema  Left lower leg: No edema  Feet:      Right foot:      Skin integrity: No ulcer, skin breakdown, erythema, warmth, callus or dry skin  Left foot:      Skin integrity: No ulcer, skin breakdown, erythema, warmth, callus or dry skin  Skin:     General: Skin is warm  Neurological:      General: No focal deficit present  Mental Status: She is alert and oriented to person, place, and time  Psychiatric:         Mood and Affect: Mood normal          Behavior: Behavior normal          Thought Content: Thought content normal          Judgment: Judgment normal                    Some portions of this record may have been generated with voice recognition software  There may be translation, syntax, or grammatical errors  Occasional wrong word or "sound-a-like" substitutions may have occurred due to the inherent limitations of the voice recognition software  Maritza Menchaca 61   PGY3    Patient's shoes and socks removed  Right Foot/Ankle   Right Foot Inspection  Skin Exam: skin normal and skin intact  No dry skin, no warmth, no callus, no erythema, no maceration, no abnormal color, no pre-ulcer, no ulcer and no callus  Toe Exam: ROM and strength within normal limits  Sensory   Vibration: intact  Proprioception: intact  Monofilament testing: diminished    Vascular  Capillary refills: < 3 seconds  The right DP pulse is 1+  The right PT pulse is 1+  Left Foot/Ankle  Left Foot Inspection  Skin Exam: skin normal and skin intact  No dry skin, no warmth, no erythema, no maceration, normal color, no pre-ulcer, no ulcer and no callus  Toe Exam: ROM and strength within normal limits  Sensory   Vibration: intact  Proprioception: intact  Monofilament testing: diminished    Vascular  Capillary refills: < 3 seconds  The left DP pulse is 1+  The left PT pulse is 1+       Assign Risk Category  No deformity present  Loss of protective sensation  No weak pulses  Risk: 1

## 2022-04-08 NOTE — PATIENT INSTRUCTIONS
Continue current regimen   Please check blood sugars at least twice a day before meals and at bedtime, send log for review  Please have labs done as ordered   Follow-up with Ophthalmology for yearly diabetic eye exam   Follow-up in 3 months

## 2022-04-22 ENCOUNTER — APPOINTMENT (OUTPATIENT)
Dept: RADIOLOGY | Facility: CLINIC | Age: 47
End: 2022-04-22
Payer: COMMERCIAL

## 2022-04-22 ENCOUNTER — OFFICE VISIT (OUTPATIENT)
Dept: OBGYN CLINIC | Facility: CLINIC | Age: 47
End: 2022-04-22
Payer: COMMERCIAL

## 2022-04-22 VITALS
HEART RATE: 97 BPM | SYSTOLIC BLOOD PRESSURE: 115 MMHG | WEIGHT: 150 LBS | BODY MASS INDEX: 27.6 KG/M2 | HEIGHT: 62 IN | DIASTOLIC BLOOD PRESSURE: 81 MMHG

## 2022-04-22 DIAGNOSIS — M19.041 ARTHRITIS OF FINGER OF RIGHT HAND: Primary | ICD-10-CM

## 2022-04-22 DIAGNOSIS — M79.641 PAIN IN RIGHT HAND: ICD-10-CM

## 2022-04-22 PROCEDURE — 73130 X-RAY EXAM OF HAND: CPT

## 2022-04-22 PROCEDURE — 3008F BODY MASS INDEX DOCD: CPT | Performed by: ORTHOPAEDIC SURGERY

## 2022-04-22 PROCEDURE — 4004F PT TOBACCO SCREEN RCVD TLK: CPT | Performed by: ORTHOPAEDIC SURGERY

## 2022-04-22 PROCEDURE — 3079F DIAST BP 80-89 MM HG: CPT | Performed by: ORTHOPAEDIC SURGERY

## 2022-04-22 PROCEDURE — 20600 DRAIN/INJ JOINT/BURSA W/O US: CPT | Performed by: ORTHOPAEDIC SURGERY

## 2022-04-22 PROCEDURE — 3074F SYST BP LT 130 MM HG: CPT | Performed by: ORTHOPAEDIC SURGERY

## 2022-04-22 PROCEDURE — 99213 OFFICE O/P EST LOW 20 MIN: CPT | Performed by: ORTHOPAEDIC SURGERY

## 2022-04-22 RX ORDER — TRIAMCINOLONE ACETONIDE 40 MG/ML
20 INJECTION, SUSPENSION INTRA-ARTICULAR; INTRAMUSCULAR
Status: COMPLETED | OUTPATIENT
Start: 2022-04-22 | End: 2022-04-22

## 2022-04-22 RX ORDER — LIDOCAINE HYDROCHLORIDE 10 MG/ML
0.5 INJECTION, SOLUTION INFILTRATION; PERINEURAL
Status: COMPLETED | OUTPATIENT
Start: 2022-04-22 | End: 2022-04-22

## 2022-04-22 RX ADMIN — TRIAMCINOLONE ACETONIDE 20 MG: 40 INJECTION, SUSPENSION INTRA-ARTICULAR; INTRAMUSCULAR at 09:31

## 2022-04-22 RX ADMIN — LIDOCAINE HYDROCHLORIDE 0.5 ML: 10 INJECTION, SOLUTION INFILTRATION; PERINEURAL at 09:31

## 2022-04-22 NOTE — PROGRESS NOTES
Assessment/Plan:  1  Arthritis of finger of right hand  Small joint arthrocentesis: R ring DIP    Small joint arthrocentesis: R ring PIP   2  Pain in right hand  XR hand 3+ vw right       Scribe Attestation    I,:  Edyta Morgan MA am acting as a scribe while in the presence of the attending physician :       I,:  Emmy Person, DO personally performed the services described in this documentation    as scribed in my presence  :             25-year-old female with right ring finger DIP and PIP arthritis  She is tender to palpation over the DIP and PIP joints right ring finger in the office today  Treatment options were discussed in the form of steroid injections  Patient was agreeable to this  She consented and underwent a right ring finger DIP and PIP steroid injection in the office today without any complications  Post injection instructions were provided  She may follow up with me as needed  Subjective:   Ruby Gore is a 56 y o  female who presents to the office today for evaluation of right ring finger pain  Patient states this has been ongoing for the past month and a half  She denies any known injury or trauma  She notes pain at the PIP and DIP joints  she states this is a throbbing ache  She notes increased pain at the end of the day  Patient is a  and does type a lot for her job  Patient states when she was 11years old she got her right hand caught sachin chimney fan and did undergo extensive surgery to her right hand  She does have a chronic deformity  She has been taking Ibuprofen as needed for pain  Patient is a diabetic and does have a history of neuropathy  Her last A1 C was 7 6  Review of Systems   Constitutional: Negative for chills and fever  HENT: Negative for drooling and sneezing  Eyes: Negative for redness  Respiratory: Negative for cough and wheezing  Gastrointestinal: Negative for nausea and vomiting     Musculoskeletal: Negative for arthralgias, joint swelling and myalgias  Neurological: Negative for weakness and numbness  Psychiatric/Behavioral: Negative for behavioral problems  The patient is not nervous/anxious            Past Medical History:   Diagnosis Date    Acute gastritis     [id number] RESOLVED    Allergic     Asthma     Breast pain     [id number] RESOLVED    [facility] [address] )     MELLITUS    Diabetes mellitus ([facility] )     Viral gastroenteritis     [id number] RESOLVED       Past Surgical History:   Procedure Laterality Date    HAND SURGERY      SKIN BIOPSY         Family History   Problem Relation Age of Onset    Hypertension Mother     Breast cancer Family     Colon cancer Family     Diabetes Family         MELLITUS    Lung cancer Family     Mental illness Neg Hx     Substance Abuse Neg Hx        Social History     Occupational History    Not on file   Tobacco Use    Smoking status: Current Every Day Smoker    Smokeless tobacco: Never Used   Vaping Use    Vaping Use: Never used   Substance and Sexual Activity    Alcohol use: Not Currently     Comment: 2-3 drinks     Drug use: No    Sexual activity: Yes     Partners: Male     Birth control/protection: None         Current Outpatient Medications:     albuterol (PROVENTIL HFA,VENTOLIN HFA) 90 mcg/act inhaler, Inhale 2 puffs every 6 (six) hours as needed for wheezing or shortness of breath, Disp: 1 Inhaler, Rfl: 1    ALPRAZolam (XANAX) 0 25 mg tablet, Take 1 tablet (0 25 mg total) by mouth daily at bedtime as needed for anxiety, Disp: 30 tablet, Rfl: 0    Dapagliflozin-metFORMIN HCl ER (Xigduo XR) 5-500 MG TB24, Take 1 tablet by mouth, Disp: , Rfl:     escitalopram (LEXAPRO) 10 mg tablet, take 1 tablet by mouth once daily, Disp: 90 tablet, Rfl: 1    gabapentin (NEURONTIN) 800 mg tablet, Take 1 tablet (800 mg total) by mouth 3 (three) times a day, Disp: 90 tablet, Rfl: 5    linaGLIPtin (Tradjenta) 5 MG TABS, Take 5 mg by mouth daily Tradjenta 1 tablet daily, Disp: , Rfl:    Multiple Vitamin (DAILY VALUE MULTIVITAMIN) TABS, Take by mouth daily , Disp: , Rfl:     OneTouch Ultra test strip, Use 1 each 2 (two) times a day, Disp: 180 strip, Rfl: 3    ramipril (ALTACE) 2 5 mg capsule, take 1 capsule by mouth once daily, Disp: 90 capsule, Rfl: 0    ergocalciferol (VITAMIN D2) 50,000 units, 50,000 Units once a week  (Patient not taking: Reported on 4/22/2022 ), Disp: , Rfl:     rosuvastatin (CRESTOR) 5 mg tablet, Take 5 mg by mouth daily (Patient not taking: Reported on 4/8/2022 ), Disp: , Rfl:     No Known Allergies    Objective:  Vitals:    04/22/22 0859   BP: 115/81   Pulse: 97       Ortho Exam     Right ring finger     NTTP A1 pulley  TTP PIP and DIP  Compartments soft  Brisk capillary refill  S/m intact median, radial, and ulnar nerve     Physical Exam  Constitutional:       Appearance: She is well-developed  HENT:      Head: Normocephalic and atraumatic  Eyes:      General:         Right eye: No discharge  Left eye: No discharge  Conjunctiva/sclera: Conjunctivae normal    Cardiovascular:      Rate and Rhythm: Normal rate  Pulmonary:      Effort: Pulmonary effort is normal  No respiratory distress  Musculoskeletal:      Cervical back: Normal range of motion and neck supple  Comments: As noted in HPI   Skin:     General: Skin is warm and dry  Neurological:      Mental Status: She is alert and oriented to person, place, and time  Psychiatric:         Behavior: Behavior normal          Thought Content: Thought content normal          Judgment: Judgment normal      Small joint arthrocentesis: R ring DIP  Universal Protocol:  Consent: Verbal consent obtained    Consent given by: patient  Patient identity confirmed: verbally with patient    Supporting Documentation  Indications: pain   Procedure Details  Location: ring finger - R ring DIP  Preparation: Patient was prepped and draped in the usual sterile fashion  Needle size: 27 G  Ultrasound guidance: no  Medications administered: 0 5 mL lidocaine 1 %; 20 mg triamcinolone acetonide 40 mg/mL    Patient tolerance: patient tolerated the procedure well with no immediate complications  Dressing:  Sterile dressing applied    Small joint arthrocentesis: R ring PIP  Universal Protocol:  Consent: Verbal consent obtained  Consent given by: patient  Patient identity confirmed: verbally with patient    Supporting Documentation  Indications: pain   Procedure Details  Location: ring finger - R ring PIP  Preparation: Patient was prepped and draped in the usual sterile fashion  Needle size: 27 G  Ultrasound guidance: no  Medications administered: 0 5 mL lidocaine 1 %; 20 mg triamcinolone acetonide 40 mg/mL    Patient tolerance: patient tolerated the procedure well with no immediate complications  Dressing:  Sterile dressing applied        I have personally reviewed pertinent films in PACS and my interpretation is as follows:X-ray right hand performed in the office today demonstrates early degenerative changes right ring PIP and DIP

## 2022-05-23 ENCOUNTER — HOSPITAL ENCOUNTER (EMERGENCY)
Facility: HOSPITAL | Age: 47
Discharge: HOME | End: 2022-05-23
Attending: EMERGENCY MEDICINE
Payer: COMMERCIAL

## 2022-05-23 ENCOUNTER — OFFICE VISIT (OUTPATIENT)
Dept: FAMILY MEDICINE CLINIC | Facility: CLINIC | Age: 47
End: 2022-05-23
Payer: COMMERCIAL

## 2022-05-23 ENCOUNTER — APPOINTMENT (EMERGENCY)
Dept: RADIOLOGY | Facility: HOSPITAL | Age: 47
End: 2022-05-23
Payer: COMMERCIAL

## 2022-05-23 ENCOUNTER — TELEPHONE (OUTPATIENT)
Dept: HEMATOLOGY ONCOLOGY | Facility: CLINIC | Age: 47
End: 2022-05-23

## 2022-05-23 VITALS
SYSTOLIC BLOOD PRESSURE: 110 MMHG | OXYGEN SATURATION: 98 % | WEIGHT: 141 LBS | HEIGHT: 63 IN | TEMPERATURE: 98 F | RESPIRATION RATE: 16 BRPM | HEART RATE: 85 BPM | DIASTOLIC BLOOD PRESSURE: 60 MMHG | BODY MASS INDEX: 24.98 KG/M2

## 2022-05-23 VITALS
DIASTOLIC BLOOD PRESSURE: 62 MMHG | OXYGEN SATURATION: 98 % | TEMPERATURE: 97.8 F | HEART RATE: 64 BPM | HEIGHT: 63 IN | RESPIRATION RATE: 16 BRPM | SYSTOLIC BLOOD PRESSURE: 112 MMHG | WEIGHT: 142 LBS | BODY MASS INDEX: 25.16 KG/M2

## 2022-05-23 DIAGNOSIS — D69.6 THROMBOCYTOPENIA (HCC): ICD-10-CM

## 2022-05-23 DIAGNOSIS — E11.65 HYPERGLYCEMIA DUE TO TYPE 2 DIABETES MELLITUS (HCC): Primary | ICD-10-CM

## 2022-05-23 DIAGNOSIS — R11.2 NAUSEA AND VOMITING, UNSPECIFIED VOMITING TYPE: ICD-10-CM

## 2022-05-23 DIAGNOSIS — R82.4 URINE KETONES: ICD-10-CM

## 2022-05-23 DIAGNOSIS — E11.42 DIABETIC POLYNEUROPATHY ASSOCIATED WITH TYPE 2 DIABETES MELLITUS (HCC): ICD-10-CM

## 2022-05-23 DIAGNOSIS — F32.A ANXIETY AND DEPRESSION: ICD-10-CM

## 2022-05-23 DIAGNOSIS — F41.9 ANXIETY AND DEPRESSION: ICD-10-CM

## 2022-05-23 DIAGNOSIS — J45.20 ALLERGIC ASTHMA, MILD INTERMITTENT, UNCOMPLICATED: ICD-10-CM

## 2022-05-23 DIAGNOSIS — B34.9 VIRAL INFECTION, UNSPECIFIED: Primary | ICD-10-CM

## 2022-05-23 DIAGNOSIS — R11.2 NAUSEA & VOMITING: ICD-10-CM

## 2022-05-23 LAB
ALBUMIN SERPL BCP-MCNC: 3.6 G/DL (ref 3.5–5)
ALP SERPL-CCNC: 61 U/L (ref 46–116)
ALT SERPL W P-5'-P-CCNC: 19 U/L (ref 12–78)
AMORPH URATE CRY URNS QL MICRO: ABNORMAL /HPF
ANION GAP SERPL CALCULATED.3IONS-SCNC: 12 MMOL/L (ref 4–13)
AST SERPL W P-5'-P-CCNC: 16 U/L (ref 5–45)
BACTERIA UR QL AUTO: ABNORMAL /HPF
BASOPHILS # BLD AUTO: 0.04 THOUSANDS/ΜL (ref 0–0.1)
BASOPHILS # BLD AUTO: 0.04 THOUSANDS/ΜL (ref 0–0.1)
BASOPHILS NFR BLD AUTO: 1 % (ref 0–1)
BASOPHILS NFR BLD AUTO: 1 % (ref 0–1)
BETA-HYDROXYBUTYRATE: 3.7 MMOL/L
BILIRUB SERPL-MCNC: 0.37 MG/DL (ref 0.2–1)
BILIRUB UR QL STRIP: ABNORMAL
BUN SERPL-MCNC: 12 MG/DL (ref 5–25)
CALCIUM SERPL-MCNC: 8.1 MG/DL (ref 8.3–10.1)
CHLORIDE SERPL-SCNC: 98 MMOL/L (ref 100–108)
CLARITY UR: ABNORMAL
CO2 SERPL-SCNC: 23 MMOL/L (ref 21–32)
COLOR UR: ABNORMAL
CREAT SERPL-MCNC: 0.69 MG/DL (ref 0.6–1.3)
EOSINOPHIL # BLD AUTO: 0.03 THOUSAND/ΜL (ref 0–0.61)
EOSINOPHIL # BLD AUTO: 0.04 THOUSAND/ΜL (ref 0–0.61)
EOSINOPHIL NFR BLD AUTO: 1 % (ref 0–6)
EOSINOPHIL NFR BLD AUTO: 1 % (ref 0–6)
ERYTHROCYTE [DISTWIDTH] IN BLOOD BY AUTOMATED COUNT: 14.3 % (ref 11.6–15.1)
ERYTHROCYTE [DISTWIDTH] IN BLOOD BY AUTOMATED COUNT: 14.4 % (ref 11.6–15.1)
GFR SERPL CREATININE-BSD FRML MDRD: 104 ML/MIN/1.73SQ M
GLUCOSE SERPL-MCNC: 198 MG/DL (ref 65–140)
GLUCOSE SERPL-MCNC: 204 MG/DL (ref 65–140)
GLUCOSE UR STRIP-MCNC: ABNORMAL MG/DL
HCT VFR BLD AUTO: 43.8 % (ref 34.8–46.1)
HCT VFR BLD AUTO: 46.2 % (ref 34.8–46.1)
HGB BLD-MCNC: 14.4 G/DL (ref 11.5–15.4)
HGB BLD-MCNC: 15.3 G/DL (ref 11.5–15.4)
HGB UR QL STRIP.AUTO: ABNORMAL
IMM GRANULOCYTES # BLD AUTO: 0.03 THOUSAND/UL (ref 0–0.2)
IMM GRANULOCYTES # BLD AUTO: 0.04 THOUSAND/UL (ref 0–0.2)
IMM GRANULOCYTES NFR BLD AUTO: 1 % (ref 0–2)
IMM GRANULOCYTES NFR BLD AUTO: 1 % (ref 0–2)
KETONES UR STRIP-MCNC: ABNORMAL MG/DL
LEUKOCYTE ESTERASE UR QL STRIP: ABNORMAL
LIPASE SERPL-CCNC: 129 U/L (ref 73–393)
LYMPHOCYTES # BLD AUTO: 0.77 THOUSANDS/ΜL (ref 0.6–4.47)
LYMPHOCYTES # BLD AUTO: 0.96 THOUSANDS/ΜL (ref 0.6–4.47)
LYMPHOCYTES NFR BLD AUTO: 13 % (ref 14–44)
LYMPHOCYTES NFR BLD AUTO: 18 % (ref 14–44)
MAGNESIUM SERPL-MCNC: 1.9 MG/DL (ref 1.6–2.6)
MCH RBC QN AUTO: 30.4 PG (ref 26.8–34.3)
MCH RBC QN AUTO: 30.5 PG (ref 26.8–34.3)
MCHC RBC AUTO-ENTMCNC: 32.9 G/DL (ref 31.4–37.4)
MCHC RBC AUTO-ENTMCNC: 33.1 G/DL (ref 31.4–37.4)
MCV RBC AUTO: 92 FL (ref 82–98)
MCV RBC AUTO: 93 FL (ref 82–98)
MONOCYTES # BLD AUTO: 0.43 THOUSAND/ΜL (ref 0.17–1.22)
MONOCYTES # BLD AUTO: 0.6 THOUSAND/ΜL (ref 0.17–1.22)
MONOCYTES NFR BLD AUTO: 10 % (ref 4–12)
MONOCYTES NFR BLD AUTO: 8 % (ref 4–12)
NEUTROPHILS # BLD AUTO: 3.75 THOUSANDS/ΜL (ref 1.85–7.62)
NEUTROPHILS # BLD AUTO: 4.49 THOUSANDS/ΜL (ref 1.85–7.62)
NEUTS SEG NFR BLD AUTO: 71 % (ref 43–75)
NEUTS SEG NFR BLD AUTO: 74 % (ref 43–75)
NITRITE UR QL STRIP: NEGATIVE
NON-SQ EPI CELLS URNS QL MICRO: ABNORMAL /HPF
NRBC BLD AUTO-RTO: 0 /100 WBCS
NRBC BLD AUTO-RTO: 0 /100 WBCS
PH UR STRIP.AUTO: 5.5 [PH]
PLATELET # BLD AUTO: 22 THOUSANDS/UL (ref 149–390)
PLATELET # BLD AUTO: 25 THOUSANDS/UL (ref 149–390)
PMV BLD AUTO: 10.5 FL (ref 8.9–12.7)
PMV BLD AUTO: 12.7 FL (ref 8.9–12.7)
POTASSIUM SERPL-SCNC: 4.2 MMOL/L (ref 3.5–5.3)
PROT SERPL-MCNC: 7.2 G/DL (ref 6.4–8.2)
PROT UR STRIP-MCNC: ABNORMAL MG/DL
RBC # BLD AUTO: 4.73 MILLION/UL (ref 3.81–5.12)
RBC # BLD AUTO: 5.02 MILLION/UL (ref 3.81–5.12)
RBC #/AREA URNS AUTO: ABNORMAL /HPF
SL AMB  POCT GLUCOSE, UA: ABNORMAL
SL AMB LEUKOCYTE ESTERASE,UA: NEGATIVE
SL AMB POCT BILIRUBIN,UA: NEGATIVE
SL AMB POCT BLOOD,UA: ABNORMAL
SL AMB POCT CLARITY,UA: ABNORMAL
SL AMB POCT COLOR,UA: YELLOW
SL AMB POCT GLUCOSE BLD: 263
SL AMB POCT KETONES,UA: ABNORMAL
SL AMB POCT NITRITE,UA: NEGATIVE
SL AMB POCT PH,UA: 5.5
SL AMB POCT SPECIFIC GRAVITY,UA: 1.02
SL AMB POCT URINE PROTEIN: ABNORMAL
SL AMB POCT UROBILINOGEN: 0.2
SODIUM SERPL-SCNC: 133 MMOL/L (ref 136–145)
SP GR UR STRIP.AUTO: >=1.03 (ref 1–1.03)
UROBILINOGEN UR QL STRIP.AUTO: 0.2 E.U./DL
WBC # BLD AUTO: 5.25 THOUSAND/UL (ref 4.31–10.16)
WBC # BLD AUTO: 5.97 THOUSAND/UL (ref 4.31–10.16)
WBC #/AREA URNS AUTO: ABNORMAL /HPF

## 2022-05-23 PROCEDURE — 87636 SARSCOV2 & INF A&B AMP PRB: CPT | Performed by: NURSE PRACTITIONER

## 2022-05-23 PROCEDURE — 83690 ASSAY OF LIPASE: CPT | Performed by: EMERGENCY MEDICINE

## 2022-05-23 PROCEDURE — 82010 KETONE BODYS QUAN: CPT | Performed by: EMERGENCY MEDICINE

## 2022-05-23 PROCEDURE — 83036 HEMOGLOBIN GLYCOSYLATED A1C: CPT

## 2022-05-23 PROCEDURE — 82948 REAGENT STRIP/BLOOD GLUCOSE: CPT | Performed by: NURSE PRACTITIONER

## 2022-05-23 PROCEDURE — 96361 HYDRATE IV INFUSION ADD-ON: CPT

## 2022-05-23 PROCEDURE — 99285 EMERGENCY DEPT VISIT HI MDM: CPT

## 2022-05-23 PROCEDURE — 81003 URINALYSIS AUTO W/O SCOPE: CPT | Performed by: NURSE PRACTITIONER

## 2022-05-23 PROCEDURE — 83735 ASSAY OF MAGNESIUM: CPT | Performed by: EMERGENCY MEDICINE

## 2022-05-23 PROCEDURE — 81001 URINALYSIS AUTO W/SCOPE: CPT | Performed by: EMERGENCY MEDICINE

## 2022-05-23 PROCEDURE — 3061F NEG MICROALBUMINURIA REV: CPT | Performed by: NURSE PRACTITIONER

## 2022-05-23 PROCEDURE — 80053 COMPREHEN METABOLIC PANEL: CPT | Performed by: EMERGENCY MEDICINE

## 2022-05-23 PROCEDURE — 99284 EMERGENCY DEPT VISIT MOD MDM: CPT | Performed by: EMERGENCY MEDICINE

## 2022-05-23 PROCEDURE — 99214 OFFICE O/P EST MOD 30 MIN: CPT | Performed by: NURSE PRACTITIONER

## 2022-05-23 PROCEDURE — 85025 COMPLETE CBC W/AUTO DIFF WBC: CPT | Performed by: EMERGENCY MEDICINE

## 2022-05-23 PROCEDURE — 36415 COLL VENOUS BLD VENIPUNCTURE: CPT | Performed by: EMERGENCY MEDICINE

## 2022-05-23 PROCEDURE — G1004 CDSM NDSC: HCPCS

## 2022-05-23 PROCEDURE — 70450 CT HEAD/BRAIN W/O DYE: CPT

## 2022-05-23 PROCEDURE — 82948 REAGENT STRIP/BLOOD GLUCOSE: CPT

## 2022-05-23 PROCEDURE — 96374 THER/PROPH/DIAG INJ IV PUSH: CPT

## 2022-05-23 RX ORDER — ONDANSETRON 2 MG/ML
4 INJECTION INTRAMUSCULAR; INTRAVENOUS ONCE
Status: COMPLETED | OUTPATIENT
Start: 2022-05-23 | End: 2022-05-23

## 2022-05-23 RX ORDER — ONDANSETRON 8 MG/1
8 TABLET, ORALLY DISINTEGRATING ORAL EVERY 8 HOURS PRN
Qty: 30 TABLET | Refills: 0 | Status: SHIPPED | OUTPATIENT
Start: 2022-05-23

## 2022-05-23 RX ADMIN — SODIUM CHLORIDE 1000 ML: 0.9 INJECTION, SOLUTION INTRAVENOUS at 11:20

## 2022-05-23 RX ADMIN — ONDANSETRON 4 MG: 2 INJECTION INTRAMUSCULAR; INTRAVENOUS at 11:20

## 2022-05-23 NOTE — ED NOTES
Patient refused a blanket at this time  Medication administered as per order, fluids hung, call bell within reach  Patient was previously crying and now thankful for the fluids and medication       Naga Santamaria RN  05/23/22 4540

## 2022-05-23 NOTE — ED PROVIDER NOTES
History  Chief Complaint   Patient presents with    Hyperglycemia - Symptomatic     "I've had headache, nausea and congestion since Thursday and I went to the dr today and they tested my urine and told me my ketones were super duper high and I need to get to the ER"  states her sugar was 263     Patient here with complaint of headache, nausea, vomiting, abd pain and nasal congestion since Thursday  She denies sick contacts  Patient has a prior history of diabetes and has been unable to tolerate her oral medications today  She was evaluated at her primary care physician's office and directed to the ER  COVID 19 test obtained and pending  She also has a prior medical history of hypertension, hyperlipidemia  Patient is crying at the time of initial evaluation  History provided by:  Patient  History limited by:  Psychiatric disorder   used: No        Prior to Admission Medications   Prescriptions Last Dose Informant Patient Reported? Taking?    Dapagliflozin-metFORMIN HCl ER (Xigduo XR) 5-500 MG TB24   Yes No   Sig: Take 1 tablet by mouth   Multiple Vitamin (DAILY VALUE MULTIVITAMIN) TABS   Yes No   Sig: Take by mouth daily    OneTouch Ultra test strip   No No   Sig: Use 1 each 2 (two) times a day   albuterol (PROVENTIL HFA,VENTOLIN HFA) 90 mcg/act inhaler  Self No No   Sig: Inhale 2 puffs every 6 (six) hours as needed for wheezing or shortness of breath   ergocalciferol (VITAMIN D2) 50,000 units   Yes No   Si,000 Units once a week    Patient not taking: No sig reported   escitalopram (LEXAPRO) 10 mg tablet   No No   Sig: take 1 tablet by mouth once daily   gabapentin (NEURONTIN) 800 mg tablet   No No   Sig: Take 1 tablet (800 mg total) by mouth 3 (three) times a day   linaGLIPtin (Tradjenta) 5 MG TABS   Yes No   Sig: Take 5 mg by mouth daily Tradjenta 1 tablet daily   ondansetron (ZOFRAN-ODT) 8 mg disintegrating tablet   No No   Sig: Take 1 tablet (8 mg total) by mouth every 8 (eight) hours as needed for nausea or vomiting   ramipril (ALTACE) 2 5 mg capsule   No No   Sig: take 1 capsule by mouth once daily   rosuvastatin (CRESTOR) 5 mg tablet   Yes No   Sig: Take 5 mg by mouth daily   Patient not taking: No sig reported      Facility-Administered Medications: None       Past Medical History:   Diagnosis Date    Acute gastritis     [id number] RESOLVED    Allergic     Asthma     Breast pain     [id number] RESOLVED    Diabetes [facility] )     MELLITUS    Diabetes mellitus ([facility] )     Viral gastroenteritis     [id number] RESOLVED       Past Surgical History:   Procedure Laterality Date    HAND SURGERY      SKIN BIOPSY         Family History   Problem Relation Age of Onset    Hypertension Mother     Breast cancer Family     Colon cancer Family     Diabetes Family         MELLITUS    Lung cancer Family     Mental illness Neg Hx     Substance Abuse Neg Hx      I have reviewed and agree with the history as documented  E-Cigarette/Vaping    E-Cigarette Use Never User      E-Cigarette/Vaping Substances    Nicotine No     THC No     CBD No     Flavoring No     Other No     Unknown No      Social History     Tobacco Use    Smoking status: Current Every Day Smoker     Packs/day: 0 50    Smokeless tobacco: Never Used   Vaping Use    Vaping Use: Never used   Substance Use Topics    Alcohol use: Not Currently     Comment: 2-3 drinks     Drug use: No       Review of Systems   Constitutional: Negative for chills and fever  Respiratory: Negative for cough, chest tightness and shortness of breath  Gastrointestinal: Positive for abdominal pain, nausea and vomiting  Negative for diarrhea  Genitourinary: Negative for dysuria, frequency, hematuria and urgency  Musculoskeletal: Negative for back pain, neck pain and neck stiffness  Psychiatric/Behavioral: The patient is nervous/anxious  All other systems reviewed and are negative        Physical Exam  Physical Exam  Vitals and nursing note reviewed  Constitutional:       General: She is not in acute distress  Appearance: She is well-developed  She is not diaphoretic  HENT:      Head: Normocephalic and atraumatic  Eyes:      Conjunctiva/sclera: Conjunctivae normal       Pupils: Pupils are equal, round, and reactive to light  Cardiovascular:      Rate and Rhythm: Normal rate and regular rhythm  Heart sounds: Normal heart sounds  No murmur heard  Pulmonary:      Effort: Pulmonary effort is normal  No respiratory distress  Breath sounds: Normal breath sounds  Abdominal:      General: Bowel sounds are normal  There is no distension  Palpations: Abdomen is soft  Tenderness: There is no abdominal tenderness  Musculoskeletal:         General: No deformity  Normal range of motion  Cervical back: Normal range of motion and neck supple  Skin:     General: Skin is warm and dry  Capillary Refill: Capillary refill takes less than 2 seconds  Coloration: Skin is not pale  Findings: No rash  Neurological:      General: No focal deficit present  Mental Status: She is alert and oriented to person, place, and time  Cranial Nerves: No cranial nerve deficit     Psychiatric:         Behavior: Behavior normal          Vital Signs  ED Triage Vitals   Temperature Pulse Respirations Blood Pressure SpO2   05/23/22 1006 05/23/22 1006 05/23/22 1006 05/23/22 1006 05/23/22 1006   97 8 °F (36 6 °C) 94 18 137/81 97 %      Temp src Heart Rate Source Patient Position - Orthostatic VS BP Location FiO2 (%)   -- 05/23/22 1333 05/23/22 1509 05/23/22 1509 --    Monitor Lying Left arm       Pain Score       05/23/22 1006       4           Vitals:    05/23/22 1248 05/23/22 1303 05/23/22 1333 05/23/22 1509   BP: 105/68 109/69 116/64 112/62   Pulse:    64   Patient Position - Orthostatic VS:    Lying         Visual Acuity      ED Medications  Medications   sodium chloride 0 9 % bolus 1,000 mL (0 mL Intravenous Stopped 5/23/22 1237)   ondansetron (ZOFRAN) injection 4 mg (4 mg Intravenous Given 5/23/22 1120)       Diagnostic Studies  Results Reviewed     Procedure Component Value Units Date/Time    Hemoglobin A1C [[id number]] Collected: 05/23/22 1309    Lab Status: In process Specimen: Blood from Arm, Left Updated: 05/24/22 1117    Urine Microscopic [[id number]]  (Abnormal) Collected: 05/23/22 1419    Lab Status: Final result Specimen: Urine, Clean Catch Updated: 05/23/22 1503     RBC, UA 1-2 /hpf      WBC, UA 2-4 /hpf      Epithelial Cells Occasional /hpf      Bacteria, UA Moderate /hpf      AMORPH URATES Occasional /hpf     UA w Reflex to Microscopic w Reflex to Culture [[id number]]  (Abnormal) Collected: 05/23/22 1419    Lab Status: Final result Specimen: Urine, Clean Catch Updated: 05/23/22 1428     Color, UA Light Yellow     Clarity, UA Cloudy     Specific Gravity, UA >=1 030     pH, UA 5 5     Leukocytes, UA Elevated glucose may cause decreased leukocyte values   See urine microscopic for [facility] result/     Nitrite, UA Negative     Protein, UA Trace mg/dl      Glucose, UA >=1000 (1%) mg/dl      Ketones, UA >=80 (3+) mg/dl      Urobilinogen, UA 0 2 E U /dl      Bilirubin, UA Interference- unable to analyze     Blood, UA Small    CBC and differential [[id number]]  (Abnormal) Collected: 05/23/22 1309    Lab Status: Final result Specimen: Blood from Arm, Left Updated: 05/23/22 1353     WBC 5 25 Thousand/uL      RBC 4 73 Million/uL      Hemoglobin 14 4 g/dL      Hematocrit 43 8 %      MCV 93 fL      MCH 30 4 pg      MCHC 32 9 g/dL      RDW 14 4 %      MPV 12 7 fL      Platelets 25 Thousands/uL      nRBC 0 /100 WBCs      Neutrophils Relative 71 %      Immat GRANS % 1 %      Lymphocytes Relative 18 %      Monocytes Relative 8 %      Eosinophils Relative 1 %      Basophils Relative 1 %      Neutrophils Absolute 3 75 Thousands/µL      Immature Grans Absolute 0 03 Thousand/uL      Lymphocytes Absolute 0 96 Thousands/µL      Monocytes Absolute 0 43 Thousand/µL      Eosinophils Absolute 0 04 Thousand/µL      Basophils Absolute 0 04 Thousands/µL     Narrative:      No [place] plt smear from earlier 5/23/22    CBC and differential [[id number]]  (Abnormal) Collected: 05/23/22 1123    Lab Status: Final result Specimen: Blood from Arm, Right Updated: 05/23/22 1200     WBC 5 97 Thousand/uL      RBC 5 02 Million/uL      Hemoglobin 15 3 g/dL      Hematocrit 46 2 %      MCV 92 fL      MCH 30 5 pg      MCHC 33 1 g/dL      RDW 14 3 %      MPV 10 5 fL      Platelets 22 Thousands/uL      nRBC 0 /100 WBCs      Neutrophils Relative 74 %      Immat GRANS % 1 %      Lymphocytes Relative 13 %      Monocytes Relative 10 %      Eosinophils Relative 1 %      Basophils Relative 1 %      Neutrophils Absolute 4 49 Thousands/µL      Immature Grans Absolute 0 04 Thousand/uL      Lymphocytes Absolute 0 77 Thousands/µL      Monocytes Absolute 0 60 Thousand/µL      Eosinophils Absolute 0 03 Thousand/µL      Basophils Absolute 0 04 Thousands/µL     Comprehensive metabolic panel [[id number]]  (Abnormal) Collected: 05/23/22 1123    Lab Status: Final result Specimen: Blood from Arm, Right Updated: 05/23/22 1154     Sodium 133 mmol/L      Potassium 4 2 mmol/L      Chloride 98 mmol/L      CO2 23 mmol/L      ANION GAP 12 mmol/L      BUN 12 mg/dL      Creatinine 0 69 mg/dL      Glucose 204 mg/dL      Calcium 8 1 mg/dL      AST 16 U/L      ALT 19 U/L      Alkaline Phosphatase 61 U/L      Total Protein 7 2 g/dL      Albumin 3 6 g/dL      Total Bilirubin 0 37 mg/dL      eGFR 104 ml/min/1 73sq m     Narrative:      [place] guidelines for Chronic Kidney Disease (CKD):     Stage 1 with normal or high GFR (GFR > 90 mL/min/1 73 square meters)    Stage 2 Mild CKD (GFR = 60-89 mL/min/1 73 square meters)    Stage 3A Moderate CKD (GFR = 45-59 mL/min/1 73 square meters)    Stage 3B Moderate CKD (GFR = 30-44 mL/min/1 73 square meters)    Stage 4 Severe CKD (GFR = 15-29 mL/min/1 73 square meters)    Stage 5 End Stage CKD (GFR <15 mL/min/1 73 square meters)  Note: GFR calculation is accurate only with a steady state creatinine    Magnesium [[id number]]  (Normal) Collected: 05/23/22 1123    Lab Status: Final result Specimen: Blood from Arm, Right Updated: 05/23/22 1154     Magnesium 1 9 mg/dL     Lipase [[id number]]  (Normal) Collected: 05/23/22 1123    Lab Status: Final result Specimen: Blood from Arm, Right Updated: 05/23/22 1150     Lipase 129 u/L     Beta Hydroxybutyrate [[id number]]  (Abnormal) Collected: 05/23/22 1123    Lab Status: Final result Specimen: Blood from Arm, Right Updated: 05/23/22 1140     BETA-HYDROXYBUTYRATE 3 7 mmol/L     Fingerstick Glucose (POCT) [[id number]]  (Abnormal) Collected: 05/23/22 1038    Lab Status: Final result Updated: 05/23/22 1039     POC Glucose 198 mg/dl                  CT head without contrast   Final Result by Alvin Riley MD (05/23 1246)      No acute intracranial hemorrhage or mass effect  Sinusitis as above  Workstation performed: [id number]                    Procedures  Procedures         ED Course                               SBIRT 20yo+    Flowsheet Row Most Recent Value   SBIRT (23 yo +)    In order to provide better care to our patients, we are screening all of our patients for alcohol and drug use  Would it be okay to ask you these screening questions?  No Filed at: 05/23/2022 1122                    MDM  Number of Diagnoses or Management Options  Hyperglycemia due to type 2 diabetes mellitus ([facility] ): new and requires workup  Nausea & vomiting: new and requires workup  Thrombocytopenia ([facility] ): new and requires workup     Amount and/or Complexity of Data Reviewed  Clinical lab tests: ordered and reviewed  Tests in the radiology section of CPT®: ordered and reviewed    Risk of Complications, Morbidity, and/or Mortality  Presenting problems: high  Diagnostic procedures: high  Management options: high    Patient Progress  Patient progress: improved      Disposition  Final diagnoses:   Hyperglycemia due to type 2 diabetes mellitus (HCC)   Thrombocytopenia (HCC)   Nausea & vomiting     Time reflects when diagnosis was documented in both MDM as applicable and the Disposition within this note     Time User Action Codes Description Comment    5/23/2022  2:02 PM Odette Elizabeth Add [E11 65] Hyperglycemia due to type 2 diabetes mellitus ([facility] )     5/23/2022  2:02 PM Odette Elizabeth O Add [D69 6] Thrombocytopenia ([facility] )     5/23/2022  2:02 PM Odette Townsend O Add [R11 2] Nausea & vomiting     5/24/2022 11:17 AM Leslie Gan Add [E11 42] Diabetic polyneuropathy associated with type 2 diabetes mellitus [facility])       ED Disposition     ED Disposition   Discharge    Condition   Stable    Date/Time   Mon May 23, 2022  2:02 PM    [address] discharge to home/self care                 Follow-up Information     Follow up With Specialties Details Why Contact Info Additional [address] [facility],Third Floor, [address] [facility], Nurse Practitioner Schedule an appointment as soon as possible for a visit in 2 days for follow up for your COVID 19 result [address] [facility] [id number] [facility] Schedule an appointment as soon as possible for a visit in 1 day for follow up for low platelets [address] [address]  [phone] [facility] Bueno, [address] [facility], [address] [phone]          Discharge Medication List as of 5/23/2022  2:04 PM      CONTINUE these medications which have NOT CHANGED    Details   albuterol (PROVENTIL HFA,VENTOLIN HFA) 90 mcg/act inhaler Inhale 2 puffs every 6 (six) hours as needed for wheezing or shortness of breath, Starting Fri 4/23/2021, Normal      Dapagliflozin-metFORMIN HCl ER (Xigduo XR) 5-500 MG TB24 Take 1 tablet by mouth, Starting Fri 7/30/2021, Historical Med      ergocalciferol (VITAMIN D2) 50,000 units 50,000 Units once a week , Starting Wed 4/15/2020, Historical Med      escitalopram (LEXAPRO) 10 mg tablet take 1 tablet by mouth once daily, Normal      gabapentin (NEURONTIN) 800 mg tablet Take 1 tablet (800 mg total) by mouth 3 (three) times a day, Starting Thu 12/2/2021, Normal      linaGLIPtin (Tradjenta) 5 MG TABS Take 5 mg by mouth daily Tradjenta 1 tablet daily, Historical Med      Multiple Vitamin (DAILY VALUE MULTIVITAMIN) TABS Take by mouth daily , Historical Med      ondansetron (ZOFRAN-ODT) 8 mg disintegrating tablet Take 1 tablet (8 mg total) by mouth every 8 (eight) hours as needed for nausea or vomiting, Starting Mon 5/23/2022, Normal      OneTouch Ultra test strip Use 1 each 2 (two) times a day, Starting Fri 4/8/2022, Normal      ramipril (ALTACE) 2 5 mg capsule take 1 capsule by mouth once daily, Normal      rosuvastatin (CRESTOR) 5 mg tablet Take 5 mg by mouth daily, Historical Med             No discharge procedures on file      PDMP Review       Value Time User    PDMP Reviewed  Yes 12/22/2020  3:20 PM Mina Nunez [facility] Provider  Electronically Signed by           Brenda Bryant DO  05/24/22 5149

## 2022-05-23 NOTE — Clinical Note
Merlin Emery was seen and treated in our emergency department on 5/23/2022  Diagnosis:     Vita Fagan  may return to work on return date  She may return on this date: 05/24/2022         If you have any questions or concerns, please don't hesitate to call        Cassie Mohr DO    ______________________________           _______________          _______________  Hospital Representative                              Date                                Time

## 2022-05-23 NOTE — TELEPHONE ENCOUNTER
New Patient Intake Form   Patient Details:    Tamiko Arana  1975    Appointment Information   Who is calling to schedule? patient   If not self, what is the caller's name? Please put name of RBC nurse as well  n/a   DID YOU CONFIRM INSURANCE WITH PATIENT? yes   Referring provider self   What is the diagnosis? Low platelets/hospital follow up     Is there a confirmed tissue diagnosis?   no   Is there a biopsy ordered or pending? Please specify dates   no     Is patient aware of diagnosis? yes   Have you had any imaging or labs done? If yes, where? (If imaging done outside of [facility], please remind patient to bring a disk ) Yes labs on 5/23      If imaging done at outside facility, did you instruct patient to obtain discs and bring to visit? n/a   Have you been seen by another Oncologist/Hematologist?  If so, who and where? no   Are the records in [facility] or Care Everywhere? yes   Does the patient have records at another facility/hospital? no   If yes, Name of facility, city and state where facility is located  Did you instruct patient to have records faxed to [facility] and provide rightfax number? n/a   Preferred [place]   Is the patient willing to be seen by another provider? (This is for breast patients only) n/a     Did you send new patient paperwork?   Email or mail? n/a   Miscellaneous Information: appt on 6/9 @ 391 with Dr Drew Reyes

## 2022-05-23 NOTE — PROGRESS NOTES
Assessment/Plan:  High ketones noted in urine and patient blood sugar is 263 and patient is possibly in diabetic ketoacidosis  Discussed with patient about possible complications and referred to emergency room  1  Viral infection, unspecified  -     Covid/Flu- Office Collect    2  Nausea and vomiting, unspecified vomiting type  -     ondansetron (ZOFRAN-ODT) 8 mg disintegrating tablet; Take 1 tablet (8 mg total) by mouth every 8 (eight) hours as needed for nausea or vomiting    3  Diabetic polyneuropathy associated with type 2 diabetes mellitus (HCC)  -     POCT urine dip auto non-scope  -     POCT blood glucose    4  Urine ketones  -     POCT urine dip auto non-scope  -     POCT blood glucose    5  Allergic asthma, mild intermittent, uncomplicated    6  Anxiety and depression  Comments:  on lexapro      Patient Instructions:  Referred to ER      Return if symptoms worsen or fail to improve  Future Appointments   Date Time Provider Beverly Hinojosa   5/27/2022 11:45 AM Osmany Bella, [address] [place] [address]   6/27/2022  8:00 AM Elvie Etienne MD  [facility] [facility]   7/21/2022  8:15 AM Derek Bardales MD [facility]   9/22/2022 11:40 AM Laurel Espino MD [facility]           Subjective:      Patient ID: Ilene Recio is a 56 y o  female  Chief Complaint   Patient presents with    Headache     Nausea, high blood sugars, tired, diarrhea, 2 neg COVID tests  Cold sweats  Sore throat  Whitney Wells MA     Fatigue         Vitals:  /60   Pulse 85   Temp 98 °F (36 7 °C)   Resp 16   Ht 5' 2 5" (1 588 m)   Wt 64 kg (141 lb)   LMP 05/07/2022 (Approximate)   SpO2 98%   BMI 25 38 kg/m²     HPI   Patient stated that started with symptoms on 5/19/2022 with headache, nausea, vomiting, diarrhea and sore throat and feeling very fatigue  Stated that blood sugar been running between 200 to 300  Did home covid-19 test twice which came back negative    Stated that not been able to eat due to nausea  Stated that complaint with medications      The following portions of the patient's history were reviewed and updated as appropriate: allergies, current medications, past family history, past medical history, past social history, past surgical history and problem list       Review of Systems   Constitutional: Positive for diaphoresis and fatigue  Negative for chills, fever and unexpected weight change  HENT: Positive for sore throat  Negative for congestion, dental problem, drooling, ear discharge, ear pain, facial swelling, hearing loss, mouth sores, nosebleeds, postnasal drip, rhinorrhea, sinus pressure, sinus pain, sneezing, tinnitus, trouble swallowing and voice change  Respiratory: Negative for cough, chest tightness, shortness of breath and wheezing  Cardiovascular: Negative  Gastrointestinal: Positive for nausea and vomiting  Negative for abdominal pain, constipation and diarrhea  Musculoskeletal: Negative  Skin: Negative  Neurological: Positive for headaches  Negative for dizziness and light-headedness           Objective:    Social History     Tobacco Use   Smoking Status Current Every Day Smoker   Smokeless Tobacco Never Used       Allergies: No Known Allergies      Current Outpatient Medications   Medication Sig Dispense Refill    albuterol (PROVENTIL HFA,VENTOLIN HFA) 90 mcg/act inhaler Inhale 2 puffs every 6 (six) hours as needed for wheezing or shortness of breath 1 Inhaler 1    Dapagliflozin-metFORMIN HCl ER (Xigduo XR) 5-500 MG TB24 Take 1 tablet by mouth      escitalopram (LEXAPRO) 10 mg tablet take 1 tablet by mouth once daily 90 tablet 1    gabapentin (NEURONTIN) 800 mg tablet Take 1 tablet (800 mg total) by mouth 3 (three) times a day 90 tablet 5    linaGLIPtin (Tradjenta) 5 MG TABS Take 5 mg by mouth daily Tradjenta 1 tablet daily      Multiple Vitamin (DAILY VALUE MULTIVITAMIN) TABS Take by mouth daily       ondansetron (ZOFRAN-ODT) 8 mg disintegrating tablet Take 1 tablet (8 mg total) by mouth every 8 (eight) hours as needed for nausea or vomiting 30 tablet 0    OneTouch Ultra test strip Use 1 each 2 (two) times a day 180 strip 3    ramipril (ALTACE) 2 5 mg capsule take 1 capsule by mouth once daily 90 capsule 0    ergocalciferol (VITAMIN D2) 50,000 units 50,000 Units once a week  (Patient not taking: No sig reported)      rosuvastatin (CRESTOR) 5 mg tablet Take 5 mg by mouth daily (Patient not taking: No sig reported)       No current facility-administered medications for this visit  Physical Exam  Vitals reviewed  Constitutional:       Appearance: Normal appearance  She is well-developed  HENT:      Head: Normocephalic  Right Ear: Tympanic membrane, ear canal and external ear normal       Left Ear: Tympanic membrane, ear canal and external ear normal       Nose: Nose normal       Right Sinus: No maxillary sinus tenderness or frontal sinus tenderness  Left Sinus: No maxillary sinus tenderness or frontal sinus tenderness  Mouth/Throat:      Mouth: No oral lesions  Pharynx: No oropharyngeal exudate or posterior oropharyngeal erythema  Cardiovascular:      Rate and Rhythm: Normal rate and regular rhythm  Heart sounds: Normal heart sounds  Pulmonary:      Effort: Pulmonary effort is normal       Breath sounds: Normal breath sounds  Musculoskeletal:         General: Normal range of motion  Cervical back: Neck supple  Lymphadenopathy:      Cervical:      Right cervical: No superficial or posterior cervical adenopathy  Left cervical: No superficial or posterior cervical adenopathy  Skin:     General: Skin is warm and dry  Neurological:      Mental Status: She is alert and oriented to person, place, and time  Psychiatric:         Behavior: Behavior normal          Thought Content:  Thought content normal          Judgment: Judgment normal            Recent Results (from the past 24 hour(s))   POCT blood glucose    Collection Time: 05/23/22  9:38 AM   Result Value Ref Range    GLUCOSE     POCT urine dip auto non-scope    Collection Time: 05/23/22  9:40 AM   Result Value Ref Range     COLOR,UA yellow     CLARITY,UA cloudy with seds     SPECIFIC GRAVITY,UA 1 020      PH,UA 5 5     LEUKOCYTE ESTERASE,UA negative     NITRITE,UA negative     GLUCOSE, UA 500mg/dl     KETONES,UA 80mg     BILIRUBIN,UA negative     BLOOD,UA trace-intact     POCT URINE PROTEIN 30mg     SL AMB POCT UROBILINOGEN 0 2                ASA Fu

## 2022-05-23 NOTE — DISCHARGE INSTRUCTIONS
Return to the ER for further concerns or worsening symptoms  Follow up with your primary care physician and hem/onc in 1-2 days  Follow-up with the primary care physician for COVID-19 result

## 2022-05-24 ENCOUNTER — OFFICE VISIT (OUTPATIENT)
Dept: FAMILY MEDICINE CLINIC | Facility: CLINIC | Age: 47
End: 2022-05-24
Payer: COMMERCIAL

## 2022-05-24 VITALS
HEIGHT: 63 IN | RESPIRATION RATE: 20 BRPM | TEMPERATURE: 97.4 F | BODY MASS INDEX: 25.34 KG/M2 | SYSTOLIC BLOOD PRESSURE: 112 MMHG | HEART RATE: 95 BPM | OXYGEN SATURATION: 99 % | WEIGHT: 143 LBS | DIASTOLIC BLOOD PRESSURE: 70 MMHG

## 2022-05-24 DIAGNOSIS — D69.6 THROMBOCYTOPENIA (HCC): ICD-10-CM

## 2022-05-24 DIAGNOSIS — E11.42 DIABETIC POLYNEUROPATHY ASSOCIATED WITH TYPE 2 DIABETES MELLITUS (HCC): Primary | ICD-10-CM

## 2022-05-24 DIAGNOSIS — I10 PRIMARY HYPERTENSION: ICD-10-CM

## 2022-05-24 DIAGNOSIS — U07.1 COVID-19 VIRUS INFECTION: ICD-10-CM

## 2022-05-24 LAB
EST. AVERAGE GLUCOSE BLD GHB EST-MCNC: 246 MG/DL
FLUAV RNA RESP QL NAA+PROBE: NEGATIVE
FLUBV RNA RESP QL NAA+PROBE: NEGATIVE
HBA1C MFR BLD: 10.2 %
SARS-COV-2 AG UPPER RESP QL IA: POSITIVE
SARS-COV-2 RNA RESP QL NAA+PROBE: POSITIVE
VALID CONTROL: ABNORMAL

## 2022-05-24 PROCEDURE — 96365 THER/PROPH/DIAG IV INF INIT: CPT | Performed by: NURSE PRACTITIONER

## 2022-05-24 PROCEDURE — 36410 VNPNXR 3YR/> PHY/QHP DX/THER: CPT | Performed by: NURSE PRACTITIONER

## 2022-05-24 PROCEDURE — 99215 OFFICE O/P EST HI 40 MIN: CPT | Performed by: NURSE PRACTITIONER

## 2022-05-24 PROCEDURE — 3046F HEMOGLOBIN A1C LEVEL >9.0%: CPT | Performed by: NURSE PRACTITIONER

## 2022-05-24 PROCEDURE — 87811 SARS-COV-2 COVID19 W/OPTIC: CPT | Performed by: NURSE PRACTITIONER

## 2022-05-24 PROCEDURE — 96372 THER/PROPH/DIAG INJ SC/IM: CPT | Performed by: NURSE PRACTITIONER

## 2022-05-24 NOTE — ASSESSMENT & PLAN NOTE
A1c added to labs drawn in ER yesterday    Lab Results   Component Value Date    HGBA1C 7 6 07/26/2021

## 2022-05-24 NOTE — LETTER
May 24, 2022     Patient: Bernardo Elizondo  YOB: 1975  Date of Visit: 5/24/2022      To Whom it May Concern:    Kati Menchaca is under my professional care  Shon Spivey was seen in my office on 5/24/2022  Please excuse from work the week of 5/23/22 due to Covid 19 illness  If you have any questions or concerns, please don't hesitate to call           Sincerely,          ASA Salazar        CC: No Recipients

## 2022-05-24 NOTE — PROGRESS NOTES
Assessment/Plan:    1  Diabetic polyneuropathy associated with type 2 diabetes mellitus ([facility] [address] )    2  Thrombocytopenia (HCC)  -     CBC and differential  -     Comprehensive metabolic panel    3  COVID-19 virus infection  -     Poct Covid 19 Rapid Antigen Test  -     CBC and differential  -     Comprehensive metabolic panel  -     nirmatrelvir & ritonavir (Paxlovid) tablet therapy pack; Take 3 tablets by mouth in the morning and 3 tablets in the evening  Do all this for 5 days  Take 2 nirmatrelvir tablets + 1 ritonavir tablet together per dose  There are no Patient Instructions on file for this visit  No follow-ups on file  Subjective:      Patient ID: James Mendoza is a 56 y o  female  Chief Complaint   Patient presents with    Follow-up     ER and lab work  [facility]       Here today for ER follow up  Started 5 days ago with a severe headache  She then felt nauseas, mild diarrhea  She is not urinating much  She has no energy  Has been feeling feverish with chills and body aches  She has epigastric abdominal pain  Taking Zofran, cannot hydrate d/t nausea  The following portions of the patient's history were reviewed and updated as appropriate: allergies, current medications, past family history, past medical history, past social history, past surgical history and problem list     Review of Systems   Constitutional: Positive for chills, fatigue and fever  HENT: Negative            Current Outpatient Medications   Medication Sig Dispense Refill    albuterol (PROVENTIL HFA,VENTOLIN HFA) 90 mcg/act inhaler Inhale 2 puffs every 6 (six) hours as needed for wheezing or shortness of breath 1 Inhaler 1    Dapagliflozin-metFORMIN HCl ER (Xigduo XR) 5-500 MG TB24 Take 1 tablet by mouth      escitalopram (LEXAPRO) 10 mg tablet take 1 tablet by mouth once daily 90 tablet 1    gabapentin (NEURONTIN) 800 mg tablet Take 1 tablet (800 mg total) by mouth 3 (three) times a day 90 tablet 5 Discussion/Summary   Please keep your appointment on 4/24 to discus your test results       linaGLIPtin (Tradjenta) 5 MG TABS Take 5 mg by mouth daily Tradjenta 1 tablet daily      Multiple Vitamin (DAILY VALUE MULTIVITAMIN) TABS Take by mouth daily       nirmatrelvir & ritonavir (Paxlovid) tablet therapy pack Take 3 tablets by mouth in the morning and 3 tablets in the evening  Do all this for 5 days  Take 2 nirmatrelvir tablets + 1 ritonavir tablet together per dose  30 tablet 0    ondansetron (ZOFRAN-ODT) 8 mg disintegrating tablet Take 1 tablet (8 mg total) by mouth every 8 (eight) hours as needed for nausea or vomiting 30 tablet 0    OneTouch Ultra test strip Use 1 each 2 (two) times a day 180 strip 3    ramipril (ALTACE) 2 5 mg capsule take 1 capsule by mouth once daily 90 capsule 0    ergocalciferol (VITAMIN D2) 50,000 units 50,000 Units once a week  (Patient not taking: No sig reported)      rosuvastatin (CRESTOR) 5 mg tablet Take 5 mg by mouth daily (Patient not taking: No sig reported)       No current facility-administered medications for this visit         Objective:    /70   Pulse 95   Temp (!) 97 4 °F (36 3 °C)   Resp 20   Ht 5' 2 5" (1 588 m)   Wt 64 9 kg (143 lb)   LMP 05/07/2022 (Approximate)   SpO2 99%   BMI 25 74 kg/m²        Physical Exam           ASA Barcenas

## 2022-05-24 NOTE — PROGRESS NOTES
COVID-19 Outpatient Progress Note    Assessment/Plan:      1L NSS infused over 1 hour in office  Pt tolerated well, felt better after complete  Problem List Items Addressed This Visit        Endocrine    Diabetes mellitus with polyneuropathy ([facility] [address] ) - Primary     A1c added to labs drawn in ER yesterday    Lab Results   Component Value Date    HGBA1C 7 6 07/26/2021                 Cardiovascular and Mediastinum    Primary hypertension     stable              Other    COVID-19 virus infection    Relevant Medications    nirmatrelvir & ritonavir (Paxlovid) tablet therapy pack    sodium chloride 0 9 % bolus 1,000 mL    Other Relevant Orders    Poct Covid 19 Rapid Antigen Test (Completed)    CBC and differential    Comprehensive metabolic panel      Other Visit Diagnoses     Thrombocytopenia ([facility] [address] )        Repeat labs drawn in office today  Bleeding precautions reviewed     Relevant Orders    CBC and differential    Comprehensive metabolic panel         Procedures    Disposition:     Rapid antigen testing was performed and the result is POSITIVE for COVID-19  Patient has COVID-19 infection  Based off CDC guidelines, they were recommended to isolate for 5 days from the date of the positive test  If they remain asymptomatic, isolation may be ended followed by 5 days of wearing a mask when around othes to minimize risk of infecting others  If they have a fever, continue to stay home until fever resolves for at least 24 hours  Discussed symptom directed medication options with patient  Discussed vitamin D, vitamin C, and/or zinc supplementation with patient  Patient meets criteria for PAXLOVID and they have been counseled appropriately according to EUA documentation released by the FDA  After discussion, patient agrees to treatment      Silvio Talamantes is an investigational medicine used to treat mild-to-moderate COVID-19 in adults and children (15years of age and older weighing at least 80 pounds (40 kg)) with positive results of direct SARS-CoV-2 viral testing, and who are at high risk for progression to severe COVID-19, including hospitalization or death  PAXLOVID is investigational because it is still being studied  There is limited information about the safety and effectiveness of using PAXLOVID to treat people with mild-to-moderate COVID-19  The FDA has authorized the emergency use of PAXLOVID for the treatment of mild-tomoderate COVID-19 in adults and children (15years of age and older weighing at least 80 pounds (40 kg)) with a positive test for the virus that causes COVID-19, and who are at high risk for progression to severe COVID-19, including hospitalization or death, under an EUA  What should I tell my healthcare provider before I take PAXLOVID? Tell your healthcare provider if you:  - Have any allergies  - Have liver or kidney disease  - Are pregnant or plan to become pregnant  - Are breastfeeding a child  - Have any serious illnesses    Tell your healthcare provider about all the medicines you take, including prescription and over-the-counter medicines, vitamins, and herbal supplements  Some medicines may interact with PAXLOVID and may cause serious side effects  Keep a list of your medicines to show your healthcare provider and pharmacist when you get a new medicine  You can ask your healthcare provider or pharmacist for a list of medicines that interact with PAXLOVID  Do not start taking a new medicine without telling your healthcare provider  Your healthcare provider can tell you if it is safe to take PAXLOVID with other medicines  Tell your healthcare provider if you are taking combined hormonal contraceptive  PAXLOVID may affect how your birth control pills work  Females who are able to become pregnant should use another effective alternative form of contraception or an additional barrier method of contraception   Talk to your healthcare provider if you have any questions about contraceptive methods that might be right for you  How do I take PAXLOVID? PAXLOVID consists of 2 medicines: nirmatrelvir and ritonavir  - Take 2 pink tablets of nirmatrelvir with 1 white tablet of ritonavir by mouth 2 times each day (in the morning and in the evening) for 5 days  For each dose, take all 3 tablets at the same time  - If you have kidney disease, talk to your healthcare provider  You may need a different dose  - Swallow the tablets whole  Do not chew, break, or crush the tablets  - Take PAXLOVID with or without food  - Do not stop taking PAXLOVID without talking to your healthcare provider, even if you feel better  - If you miss a dose of PAXLOVID within 8 hours of the time it is usually taken, take it as soon as you remember  If you miss a dose by more than 8 hours, skip the missed dose and take the next dose at your regular time  Do not take 2 doses of PAXLOVID at the same time  - If you take too much PAXLOVID, call your healthcare provider or go to the nearest hospital emergency room right away  - If you are taking a ritonavir- or cobicistat-containing medicine to treat hepatitis C or Human Immunodeficiency Virus (HIV), you should continue to take your medicine as prescribed by your healthcare provider   - Talk to your healthcare provider if you do not feel better or if you feel worse after 5 days  Who should generally not take PAXLOVID? Do not take PAXLOVID if:  You are allergic to nirmatrelvir, ritonavir, or any of the ingredients in PAXLOVID      You are taking any of the following medicines:  - Alfuzosin  - Pethidine, piroxicam, propoxyphene  - Ranolazine  - Amiodarone, dronedarone, flecainide, propafenone, quinidine  - Colchicine  - Lurasidone, pimozide, clozapine  - Dihydroergotamine, ergotamine, methylergonovine  - Lovastatin, simvastatin  - Sildenafil (Revatio®) for pulmonary arterial hypertension (PAH)  - Triazolam, oral midazolam  - Apalutamide  - Carbamazepine, phenobarbital, phenytoin  - Rifampin  - St  Luis Eduardos Wort (hypericum perforatum)    What are the important possible side effects of PAXLOVID? Possible side effects of PAXLOVID are:  - Liver Problems  Tell your healthcare provider right away if you have any of these signs and symptoms of liver problems: loss of appetite, yellowing of your skin and the whites of eyes (jaundice), dark-colored urine, pale colored stools and itchy skin, stomach area (abdominal) pain  - Resistance to HIV Medicines  If you have untreated HIV infection, PAXLOVID may lead to some HIV medicines not working as well in the future  - Other possible side effects include: altered sense of taste, diarrhea, high blood pressure, or muscle aches    These are not all the possible side effects of PAXLOVID  Not many people have taken PAXLOVID  Serious and unexpected side effects may happen  [place] is still being studied, so it is possible that all of the risks are not known at this time  What other treatment choices are there? Like Adaline Isidro may allow for the emergency use of other medicines to treat people with COVID-19  Go to https://Ruck.us/ for information on the emergency use of other medicines that are authorized by FDA to treat people with COVID-19  Your healthcare provider may talk with you about clinical trials for which you may be eligible  It is your choice to be treated or not to be treated with PAXLOVID  Should you decide not to receive it or for your child not to receive it, it will not change your standard medical care  What if I am pregnant or breastfeeding? There is no experience treating pregnant women or breastfeeding mothers with PAXLOVID  For a mother and unborn baby, the benefit of taking PAXLOVID may be greater than the risk from the treatment   If you are pregnant, discuss your options and specific situation with your healthcare provider  It is recommended that you use effective barrier contraception or do not have sexual activity while taking PAXLOVID  If you are breastfeeding, discuss your options and specific situation with your healthcare provider  How do I report side effects with PAXLOVID? Contact your healthcare provider if you have any side effects that bother you or do not go away  Report side effects to FDA MedWatch at www fda gov/medwatch or call 6-355-YMW6575 or you can report side effects to [employer]  at the contact information provided below  Website Fax number Telephone number   [employer] [phone]     How should I store Fanta Kevin? Store PAXLOVID tablets at room temperature between 68°F to 77°F (20°C to 25°C)  Full fact sheet for patients, parents, and caregivers can be found at: [employer]    I have spent 80 minutes directly with the patient  Encounter provider ASA Mariee    Provider located at [address] [id number]    Recent Visits  Date Type Provider Dept   05/23/22 Office Visit Zeke Fu Jordan recent visits within past 7 days and meeting all other requirements  Today's Visits  Date Type Provider Dept   05/24/22 Office Visit Zeke Mariee today's visits and meeting all other requirements  Future Appointments  No visits were found meeting these conditions  Showing future appointments within next 150 days and meeting all other requirements     My office door was closed  No one else was in the room  Keegan Interiano acknowledged consent and understanding of privacy and security of the telemedicine visit  I informed the patient that I have reviewed her record in Epic and presented the opportunity for her to ask any questions regarding the visit today   The patient agreed to participate  Subjective:   Ludwin Rick is a 56 y o  female who is concerned about COVID-19  Patient's symptoms include chills, fatigue, sore throat, nausea, vomiting, myalgias and headache  Patient denies fever, malaise, congestion, rhinorrhea, anosmia, loss of taste, cough, shortness of breath, chest tightness, abdominal pain and diarrhea  - Date of symptom onset: 5/19/2022      COVID-19 vaccination status: Fully vaccinated (primary series)    Exposure:   Contact with a person who is under investigation (PUI) for or who is positive for COVID-19 within the last 14 days?: No    Hospitalized recently for fever and/or lower respiratory symptoms?: No      Currently a healthcare worker that is involved in direct patient care?: No      Works in a special setting where the risk of COVID-19 transmission may be high? (this may include long-term care, correctional and care home facilities; homeless shelters; assisted-living facilities and group homes ): No      Resident in a special setting where the risk of COVID-19 transmission may be high? (this may include long-term care, correctional and care home facilities; homeless shelters; assisted-living facilities and group homes ): No      Here today for ER follow up  Started 5 days ago with a severe headache  She then felt nauseas, mild diarrhea  She is not urinating much  She has no energy  Has been feeling feverish with chills and body aches  Was give IV fluids in the ER, still cannot hydrate without nausea  She has epigastric abdominal pain  Taking Zofran  Noted to be thrombocytopenic in ER  No s/s bleeding      Lab Results   Component Value Date    SARSCOV2 NOT DETECTED 05/01/2021    SARSCOVAG Positive (A) 05/24/2022     Past Medical History:   Diagnosis Date    Acute gastritis     [id number] RESOLVED    Allergic     Asthma     Breast pain     [id number] RESOLVED    Diabetes ([facility] [address] )     MELLITUS    Diabetes mellitus ([facility] [address] )     Viral gastroenteritis     [id number] RESOLVED     Past Surgical History:   Procedure Laterality Date    HAND SURGERY      SKIN BIOPSY       Current Outpatient Medications   Medication Sig Dispense Refill    albuterol (PROVENTIL HFA,VENTOLIN HFA) 90 mcg/act inhaler Inhale 2 puffs every 6 (six) hours as needed for wheezing or shortness of breath 1 Inhaler 1    Dapagliflozin-metFORMIN HCl ER (Xigduo XR) 5-500 MG TB24 Take 1 tablet by mouth      escitalopram (LEXAPRO) 10 mg tablet take 1 tablet by mouth once daily 90 tablet 1    gabapentin (NEURONTIN) 800 mg tablet Take 1 tablet (800 mg total) by mouth 3 (three) times a day 90 tablet 5    linaGLIPtin (Tradjenta) 5 MG TABS Take 5 mg by mouth daily Tradjenta 1 tablet daily      Multiple Vitamin (DAILY VALUE MULTIVITAMIN) TABS Take by mouth daily       nirmatrelvir & ritonavir (Paxlovid) tablet therapy pack Take 3 tablets by mouth in the morning and 3 tablets in the evening  Do all this for 5 days  Take 2 nirmatrelvir tablets + 1 ritonavir tablet together per dose  30 tablet 0    ondansetron (ZOFRAN-ODT) 8 mg disintegrating tablet Take 1 tablet (8 mg total) by mouth every 8 (eight) hours as needed for nausea or vomiting 30 tablet 0    OneTouch Ultra test strip Use 1 each 2 (two) times a day 180 strip 3    ramipril (ALTACE) 2 5 mg capsule take 1 capsule by mouth once daily 90 capsule 0    ergocalciferol (VITAMIN D2) 50,000 units 50,000 Units once a week  (Patient not taking: No sig reported)      rosuvastatin (CRESTOR) 5 mg tablet Take 5 mg by mouth daily (Patient not taking: No sig reported)       Current Facility-Administered Medications   Medication Dose Route Frequency Provider Last Rate Last Admin    sodium chloride 0 9 % bolus 1,000 mL  1,000 mL Intravenous Once ASA Ruiz 1,000 mL/hr at 05/24/22 1312 1,000 mL at 05/24/22 1312     No Known Allergies    Review of Systems   Constitutional: Positive for chills and fatigue  Negative for fever  HENT: Positive for sore throat  Negative for congestion and rhinorrhea  Respiratory: Negative for cough, chest tightness and shortness of breath  Gastrointestinal: Positive for nausea and vomiting  Negative for abdominal pain and diarrhea  Musculoskeletal: Positive for myalgias  Neurological: Positive for headaches  Objective:    Vitals:    05/24/22 1058   BP: 112/70   Pulse: 95   Resp: 20   Temp: (!) 97 4 °F (36 3 °C)   SpO2: 99%   Weight: 64 9 kg (143 lb)   Height: 5' 2 5" (1 588 m)       Physical Exam  Vitals and nursing note reviewed  Constitutional:       General: She is not in acute distress  Appearance: Normal appearance  She is well-developed  She is ill-appearing  HENT:      Head: Normocephalic  Right Ear: Tympanic membrane normal       Left Ear: Tympanic membrane normal    Eyes:      Conjunctiva/sclera: Conjunctivae normal    Cardiovascular:      Rate and Rhythm: Normal rate and regular rhythm  Pulses: Normal pulses  Pulmonary:      Breath sounds: Normal breath sounds  No wheezing or rales  Abdominal:      General: Bowel sounds are normal       Palpations: Abdomen is soft  Lymphadenopathy:      Cervical: No cervical adenopathy  Skin:     General: Skin is warm and dry  Capillary Refill: Capillary refill takes less than 2 seconds  Neurological:      Mental Status: She is alert and oriented to person, place, and time  Psychiatric:         Behavior: Behavior normal          VIRTUAL VISIT DISCLAIMER    Patricia Dave verbally agrees to participate in [employer]  Pt is aware that [employer] could be limited without vital signs or the ability to perform a full hands-on physical exam  Krysten Steven understands she or the provider may request at any time to terminate the video visit and request the patient to seek care or treatment in person

## 2022-05-25 ENCOUNTER — TELEPHONE (OUTPATIENT)
Dept: FAMILY MEDICINE CLINIC | Facility: CLINIC | Age: 47
End: 2022-05-25

## 2022-05-25 DIAGNOSIS — D69.6 THROMBOCYTOPENIA (HCC): Primary | ICD-10-CM

## 2022-05-25 DIAGNOSIS — U07.1 COVID-19 VIRUS INFECTION: ICD-10-CM

## 2022-05-25 LAB
ALBUMIN SERPL-MCNC: 4.2 G/DL (ref 3.8–4.8)
ALBUMIN/GLOB SERPL: 1.6 {RATIO} (ref 1.2–2.2)
ALP SERPL-CCNC: 60 IU/L (ref 44–121)
ALT SERPL-CCNC: 10 IU/L (ref 0–32)
AST SERPL-CCNC: 15 IU/L (ref 0–40)
BASOPHILS # BLD AUTO: 0 X10E3/UL (ref 0–0.2)
BASOPHILS NFR BLD AUTO: 1 %
BILIRUB SERPL-MCNC: 0.3 MG/DL (ref 0–1.2)
BUN SERPL-MCNC: 11 MG/DL (ref 6–24)
BUN/CREAT SERPL: 16 (ref 9–23)
CALCIUM SERPL-MCNC: 8.5 MG/DL (ref 8.7–10.2)
CHLORIDE SERPL-SCNC: 99 MMOL/L (ref 96–106)
CO2 SERPL-SCNC: 16 MMOL/L (ref 20–29)
CREAT SERPL-MCNC: 0.68 MG/DL (ref 0.57–1)
EGFR: 109 ML/MIN/1.73
EOSINOPHIL # BLD AUTO: 0.1 X10E3/UL (ref 0–0.4)
EOSINOPHIL NFR BLD AUTO: 2 %
ERYTHROCYTE [DISTWIDTH] IN BLOOD BY AUTOMATED COUNT: 13.4 % (ref 11.7–15.4)
GLOBULIN SER-MCNC: 2.6 G/DL (ref 1.5–4.5)
GLUCOSE SERPL-MCNC: 218 MG/DL (ref 65–99)
HCT VFR BLD AUTO: 46.4 % (ref 34–46.6)
HGB BLD-MCNC: 15.6 G/DL (ref 11.1–15.9)
IMM GRANULOCYTES # BLD: 0 X10E3/UL (ref 0–0.1)
IMM GRANULOCYTES NFR BLD: 1 %
LYMPHOCYTES # BLD AUTO: 1.1 X10E3/UL (ref 0.7–3.1)
LYMPHOCYTES NFR BLD AUTO: 22 %
MCH RBC QN AUTO: 29.8 PG (ref 26.6–33)
MCHC RBC AUTO-ENTMCNC: 33.6 G/DL (ref 31.5–35.7)
MCV RBC AUTO: 89 FL (ref 79–97)
MONOCYTES # BLD AUTO: 0.5 X10E3/UL (ref 0.1–0.9)
MONOCYTES NFR BLD AUTO: 11 %
MORPHOLOGY BLD-IMP: ABNORMAL
NEUTROPHILS # BLD AUTO: 3.3 X10E3/UL (ref 1.4–7)
NEUTROPHILS NFR BLD AUTO: 63 %
PLATELET # BLD AUTO: 36 X10E3/UL (ref 150–450)
POTASSIUM SERPL-SCNC: 4.5 MMOL/L (ref 3.5–5.2)
PROT SERPL-MCNC: 6.8 G/DL (ref 6–8.5)
RBC # BLD AUTO: 5.23 X10E6/UL (ref 3.77–5.28)
SODIUM SERPL-SCNC: 134 MMOL/L (ref 134–144)
WBC # BLD AUTO: 5 X10E3/UL (ref 3.4–10.8)

## 2022-05-25 NOTE — TELEPHONE ENCOUNTER
Spoke w/ pt regarding her labs, platelet count improving  No s/s bleeding or bruising  She is feeling a little better today, trying to increase fluids  Paxlovid was not available at pharmacy yesterday for her to   Will repeat CBC tomorrow, order is in for [facility]   Will f/u with results   Charli Heath

## 2022-05-26 ENCOUNTER — OFFICE VISIT (OUTPATIENT)
Dept: FAMILY MEDICINE CLINIC | Facility: CLINIC | Age: 47
End: 2022-05-26
Payer: COMMERCIAL

## 2022-05-26 VITALS
TEMPERATURE: 96.7 F | BODY MASS INDEX: 25.09 KG/M2 | RESPIRATION RATE: 17 BRPM | WEIGHT: 141.6 LBS | OXYGEN SATURATION: 99 % | SYSTOLIC BLOOD PRESSURE: 118 MMHG | DIASTOLIC BLOOD PRESSURE: 74 MMHG | HEART RATE: 97 BPM | HEIGHT: 63 IN

## 2022-05-26 DIAGNOSIS — D69.6 THROMBOCYTOPENIA (HCC): ICD-10-CM

## 2022-05-26 DIAGNOSIS — U07.1 COVID-19 VIRUS INFECTION: Primary | ICD-10-CM

## 2022-05-26 DIAGNOSIS — E11.42 DIABETIC POLYNEUROPATHY ASSOCIATED WITH TYPE 2 DIABETES MELLITUS (HCC): ICD-10-CM

## 2022-05-26 PROCEDURE — 3078F DIAST BP <80 MM HG: CPT | Performed by: NURSE PRACTITIONER

## 2022-05-26 PROCEDURE — 3008F BODY MASS INDEX DOCD: CPT | Performed by: NURSE PRACTITIONER

## 2022-05-26 PROCEDURE — 99213 OFFICE O/P EST LOW 20 MIN: CPT | Performed by: NURSE PRACTITIONER

## 2022-05-26 PROCEDURE — 4004F PT TOBACCO SCREEN RCVD TLK: CPT | Performed by: NURSE PRACTITIONER

## 2022-05-26 PROCEDURE — 3074F SYST BP LT 130 MM HG: CPT | Performed by: NURSE PRACTITIONER

## 2022-05-26 PROCEDURE — 36410 VNPNXR 3YR/> PHY/QHP DX/THER: CPT | Performed by: NURSE PRACTITIONER

## 2022-05-26 RX ORDER — REPAGLINIDE 2 MG/1
2 TABLET ORAL
Qty: 90 TABLET | Refills: 5 | Status: SHIPPED | OUTPATIENT
Start: 2022-05-26

## 2022-05-26 NOTE — PROGRESS NOTES
Assessment/Plan:    Will plan to repeat CBC next Tuesday as long as platelet count remains stable or improved  Continue Paxlovid  1  COVID-19 virus infection    2  Thrombocytopenia (HCC)  -     CBC and differential  -     CBC and differential; Future  -     CBC and differential  -     Hemoglobin A1C    3  Diabetic polyneuropathy associated with type 2 diabetes mellitus ([facility] [address] )  Assessment & Plan:  A1c 10 2  Was previously on Prandin, which she stopped taking because she ran out of refills  Refill sent  Pt to f/u with endo  Lab Results   Component Value Date    HGBA1C 10 2 (H) 05/23/2022       Orders:  -     repaglinide (PRANDIN) 2 mg tablet; Take 1 tablet (2 mg total) by mouth 3 (three) times a day before meals  -     Hemoglobin A1C; Future; Expected date: 08/26/2022  -     Hemoglobin A1C            There are no Patient Instructions on file for this visit  Return if symptoms worsen or fail to improve  Subjective:      Patient ID: Etta Islas is a 56 y o  female  Chief Complaint   Patient presents with    blood work drawn     Nm lpn       Here today for f/u COVID 19 infection and thrombocytopenia  She is feeling better overall  Still nauseas  Taking Paxlovid as prescribed  Needs repeat CBC drawn for f/u thrombocytopenia  She denies any bleeding or excessive bruising  The following portions of the patient's history were reviewed and updated as appropriate: allergies, current medications, past family history, past medical history, past social history, past surgical history and problem list     Review of Systems   Constitutional: Positive for fatigue  Negative for chills and fever  HENT: Negative for congestion, ear pain, postnasal drip, rhinorrhea, sinus pressure and sore throat  Respiratory: Negative for cough, shortness of breath and wheezing  Cardiovascular: Negative for chest pain  Gastrointestinal: Positive for nausea   Negative for abdominal pain, diarrhea and vomiting  Musculoskeletal: Negative for arthralgias  Skin: Negative for rash  Neurological: Negative for headaches  Current Outpatient Medications   Medication Sig Dispense Refill    albuterol (PROVENTIL HFA,VENTOLIN HFA) 90 mcg/act inhaler Inhale 2 puffs every 6 (six) hours as needed for wheezing or shortness of breath 1 Inhaler 1    Dapagliflozin-metFORMIN HCl ER (Xigduo XR) 5-500 MG TB24 Take 1 tablet by mouth      escitalopram (LEXAPRO) 10 mg tablet take 1 tablet by mouth once daily 90 tablet 1    gabapentin (NEURONTIN) 800 mg tablet Take 1 tablet (800 mg total) by mouth 3 (three) times a day 90 tablet 5    linaGLIPtin (Tradjenta) 5 MG TABS Take 5 mg by mouth daily Tradjenta 1 tablet daily      Multiple Vitamin (DAILY VALUE MULTIVITAMIN) TABS Take by mouth daily       nirmatrelvir & ritonavir (Paxlovid) tablet therapy pack Take 3 tablets by mouth in the morning and 3 tablets in the evening  Do all this for 5 days  Take 2 nirmatrelvir tablets + 1 ritonavir tablet together per dose  30 tablet 0    ondansetron (ZOFRAN-ODT) 8 mg disintegrating tablet Take 1 tablet (8 mg total) by mouth every 8 (eight) hours as needed for nausea or vomiting 30 tablet 0    OneTouch Ultra test strip Use 1 each 2 (two) times a day 180 strip 3    ramipril (ALTACE) 2 5 mg capsule take 1 capsule by mouth once daily 90 capsule 0    repaglinide (PRANDIN) 2 mg tablet Take 1 tablet (2 mg total) by mouth 3 (three) times a day before meals 90 tablet 5    ergocalciferol (VITAMIN D2) 50,000 units 50,000 Units once a week  (Patient not taking: No sig reported)      rosuvastatin (CRESTOR) 5 mg tablet Take 5 mg by mouth daily (Patient not taking: No sig reported)       No current facility-administered medications for this visit         Objective:    /74   Pulse 97   Temp (!) 96 7 °F (35 9 °C)   Resp 17   Ht 5' 2 5" (1 588 m)   Wt 64 2 kg (141 lb 9 6 oz)   LMP 05/07/2022 (Approximate)   SpO2 99%   BMI 25 49 kg/m²         Physical Exam  Vitals and nursing note reviewed  Constitutional:       Appearance: Normal appearance  She is well-developed  Cardiovascular:      Rate and Rhythm: Normal rate and regular rhythm  Heart sounds: Normal heart sounds  No murmur heard  Pulmonary:      Effort: Pulmonary effort is normal       Breath sounds: Normal breath sounds  Skin:     General: Skin is warm and dry  Neurological:      Mental Status: She is alert     Psychiatric:         Mood and Affect: Mood normal          Behavior: Behavior normal                 ASA Robison

## 2022-05-26 NOTE — ASSESSMENT & PLAN NOTE
A1c 10 2  Was previously on Prandin, which she stopped taking because she ran out of refills  Refill sent  Pt to f/u with endo      Lab Results   Component Value Date    HGBA1C 10 2 (H) 05/23/2022

## 2022-05-28 LAB
BASOPHILS # BLD AUTO: 0 X10E3/UL (ref 0–0.2)
BASOPHILS NFR BLD AUTO: 0 %
EOSINOPHIL # BLD AUTO: 0 X10E3/UL (ref 0–0.4)
EOSINOPHIL NFR BLD AUTO: 1 %
ERYTHROCYTE [DISTWIDTH] IN BLOOD BY AUTOMATED COUNT: 13.9 % (ref 11.7–15.4)
HCT VFR BLD AUTO: 47.8 % (ref 34–46.6)
HGB BLD-MCNC: 16 G/DL (ref 11.1–15.9)
IMM GRANULOCYTES # BLD: 0.1 X10E3/UL (ref 0–0.1)
IMM GRANULOCYTES NFR BLD: 1 %
LYMPHOCYTES # BLD AUTO: 1.4 X10E3/UL (ref 0.7–3.1)
LYMPHOCYTES NFR BLD AUTO: 20 %
MCH RBC QN AUTO: 30.8 PG (ref 26.6–33)
MCHC RBC AUTO-ENTMCNC: 33.5 G/DL (ref 31.5–35.7)
MCV RBC AUTO: 92 FL (ref 79–97)
MONOCYTES # BLD AUTO: 0.5 X10E3/UL (ref 0.1–0.9)
MONOCYTES NFR BLD AUTO: 7 %
NEUTROPHILS # BLD AUTO: 5 X10E3/UL (ref 1.4–7)
NEUTROPHILS NFR BLD AUTO: 71 %
PLATELET # BLD AUTO: 100 X10E3/UL (ref 150–450)
RBC # BLD AUTO: 5.2 X10E6/UL (ref 3.77–5.28)
WBC # BLD AUTO: 7.1 X10E3/UL (ref 3.4–10.8)

## 2022-06-30 ENCOUNTER — TELEPHONE (OUTPATIENT)
Dept: PAIN MEDICINE | Facility: CLINIC | Age: 47
End: 2022-06-30

## 2022-06-30 DIAGNOSIS — R20.2 PARESTHESIA: ICD-10-CM

## 2022-06-30 RX ORDER — GABAPENTIN 800 MG/1
800 TABLET ORAL 3 TIMES DAILY
Qty: 90 TABLET | Refills: 5 | Status: SHIPPED | OUTPATIENT
Start: 2022-06-30 | End: 2023-03-16

## 2022-06-30 NOTE — TELEPHONE ENCOUNTER
Pt contacted Call Center requested refill of their medication  Pt has an OVS on 07/21/2022    Medication Name: gabapentin (NEURONTIN          Dosage of Med: 800 mg       Frequency of Med:Take 1 tablet (800 mg total) by mouth 3 (three) times a day      Remaining Medication: 2 days       Pharmacy and Location:  GUM[facility] ([address], [address] [facility] ([address] [address])   [address] [address] [place] [address] 70 ([address] [place] [address]), [place] [address]   Phone:  [phone]        Pt  Preferred Callback Phone Number: [phone]      Thank you

## 2022-07-09 DIAGNOSIS — F32.A ANXIETY AND DEPRESSION: ICD-10-CM

## 2022-07-09 DIAGNOSIS — F41.9 ANXIETY AND DEPRESSION: ICD-10-CM

## 2022-07-09 RX ORDER — ESCITALOPRAM OXALATE 10 MG/1
TABLET ORAL
Qty: 90 TABLET | Refills: 1 | Status: SHIPPED | OUTPATIENT
Start: 2022-07-09

## 2022-07-20 ENCOUNTER — OFFICE VISIT (OUTPATIENT)
Dept: PAIN MEDICINE | Facility: CLINIC | Age: 47
End: 2022-07-20
Payer: COMMERCIAL

## 2022-07-20 VITALS
RESPIRATION RATE: 18 BRPM | HEART RATE: 87 BPM | SYSTOLIC BLOOD PRESSURE: 124 MMHG | DIASTOLIC BLOOD PRESSURE: 78 MMHG | WEIGHT: 137 LBS | HEIGHT: 63 IN | BODY MASS INDEX: 24.27 KG/M2

## 2022-07-20 DIAGNOSIS — E13.42 DIABETIC POLYNEUROPATHY ASSOCIATED WITH OTHER SPECIFIED DIABETES MELLITUS (HCC): ICD-10-CM

## 2022-07-20 DIAGNOSIS — M46.1 SACROILIITIS (HCC): ICD-10-CM

## 2022-07-20 DIAGNOSIS — G89.4 CHRONIC PAIN SYNDROME: Primary | ICD-10-CM

## 2022-07-20 PROCEDURE — 99214 OFFICE O/P EST MOD 30 MIN: CPT

## 2022-07-20 PROCEDURE — 3078F DIAST BP <80 MM HG: CPT

## 2022-07-20 PROCEDURE — 3074F SYST BP LT 130 MM HG: CPT

## 2022-07-20 NOTE — PROGRESS NOTES
Assessment:  1  Chronic pain syndrome    2  Diabetic polyneuropathy associated with other specified diabetes mellitus ([facility] )    3  Sacroiliitis ([facility] )        Plan:  The patient is a 52-year-old female with a history of chronic pain secondary to low back pain, sacroiliitis, bilateral hand pain, bilateral feet pain, peripheral neuropathy and left hip pain who presents to the office with ongoing bilateral hand pain and pain in numbness into bilateral feet that is unchanged since her last office visit  She continues with ongoing relief of her pain and numbness taking gabapentin, therefore I will continue her on this medication  Patient will follow-up as needed for medication re-evaluation and refills  My impressions and treatment recommendations were discussed in detail with the patient who verbalized understanding and had no further questions  Discharge instructions were provided  I personally saw and examined the patient and I agree with the above discussed plan of care  No orders of the defined types were placed in this encounter  No orders of the defined types were placed in this encounter  History of Present Illness:  Monisha Zabala is a 56 y o  female with a history of chronic pain secondary to low back pain, sacroiliitis, bilateral hand pain, bilateral feet pain, peripheral neuropathy and left hip pain  She was last seen on 04/21/2021 where she was ordered bilateral SI joint injections, however she never scheduled this procedure  She presents to the office with ongoing bilateral hand pain and pain into bilateral feet  She states her pain is the same since the last office visit and intermittent but worse in the evening and at night  She rates the quality of her pain as burning, throbbing, numbness and pins/needles and is currently rating it a 6/10 numeric scale  Current pain medications include gabapentin 800 mg 1 tablet 3 times a day    She also uses capsaicin cream as well as CBD cream   She states this medication regimen provides her 80% relief of her pain symptoms without side effects  I have personally reviewed and/or updated the patient's past medical history, past surgical history, family history, social history, current medications, allergies, and vital signs today  Review of Systems   Respiratory: Negative for shortness of breath  Cardiovascular: Negative for chest pain  Gastrointestinal: Negative for constipation, diarrhea, nausea and vomiting  Musculoskeletal: Negative for arthralgias, gait problem, joint swelling and myalgias  Hand & Foot Pain   Skin: Negative for rash  Neurological: Negative for dizziness, seizures and weakness  All other systems reviewed and are negative  Patient Active Problem List   Diagnosis    Adverse reaction to statin medication    Allergic asthma, mild intermittent, uncomplicated    Anxiety and depression    Diabetes mellitus with neurological manifestations, uncontrolled    Diabetes mellitus with polyneuropathy (HCC)    Burning sensation    Fatigue    Left breast mass    Low back pain    Nicotine dependence    Overweight (BMI 25 0-29  9)    AC joint arthropathy    Plantar fasciitis    Onychomycosis    Globus sensation    Gastroparesis due to DM ([facility] )    COVID-19 virus infection    Primary hypertension       Past Medical History:   Diagnosis Date    Acute gastritis     [id number] RESOLVED    Allergic     Asthma     Breast pain     [id number] RESOLVED    Diabetes ([facility] )     MELLITUS    Diabetes mellitus ([facility] )     Viral gastroenteritis     [id number] RESOLVED       Past Surgical History:   Procedure Laterality Date    HAND SURGERY      SKIN BIOPSY         Family History   Problem Relation Age of Onset    Hypertension Mother     Breast cancer Family     Colon cancer Family     Diabetes Family         MELLITUS    Lung cancer Family     Mental illness Neg Hx     Substance Abuse Neg Hx        Social History     Occupational History    Not on file   Tobacco Use    Smoking status: Current Every Day Smoker     Packs/day: 0 50    Smokeless tobacco: Never Used   Vaping Use    Vaping Use: Never used   Substance and Sexual Activity    Alcohol use: Not Currently     Comment: 2-3 drinks     Drug use: No    Sexual activity: Yes     Partners: Male     Birth control/protection: None       Current Outpatient Medications on File Prior to Visit   Medication Sig    albuterol (PROVENTIL HFA,VENTOLIN HFA) 90 mcg/act inhaler Inhale 2 puffs every 6 (six) hours as needed for wheezing or shortness of breath    Dapagliflozin-metFORMIN HCl ER (Xigduo XR) 5-500 MG TB24 Take 1 tablet by mouth    escitalopram (LEXAPRO) 10 mg tablet take 1 tablet by mouth once daily    gabapentin (NEURONTIN) 800 mg tablet Take 1 tablet (800 mg total) by mouth 3 (three) times a day    linaGLIPtin (Tradjenta) 5 MG TABS Take 5 mg by mouth daily Tradjenta 1 tablet daily    Multiple Vitamin (DAILY VALUE MULTIVITAMIN) TABS Take by mouth daily     ondansetron (ZOFRAN-ODT) 8 mg disintegrating tablet Take 1 tablet (8 mg total) by mouth every 8 (eight) hours as needed for nausea or vomiting    OneTouch Ultra test strip Use 1 each 2 (two) times a day    ramipril (ALTACE) 2 5 mg capsule take 1 capsule by mouth once daily    repaglinide (PRANDIN) 2 mg tablet Take 1 tablet (2 mg total) by mouth 3 (three) times a day before meals    ergocalciferol (VITAMIN D2) 50,000 units 50,000 Units once a week  (Patient not taking: No sig reported)    rosuvastatin (CRESTOR) 5 mg tablet Take 5 mg by mouth daily (Patient not taking: No sig reported)     No current facility-administered medications on file prior to visit         No Known Allergies    Physical Exam:    /78   Pulse 87   Resp 18   Ht 5' 2 5" (1 588 m)   Wt 62 1 kg (137 lb)   BMI 24 66 kg/m²     Constitutional:normal, well developed, well nourished, alert, in no distress and non-toxic and no overt pain behavior    Eyes:anicteric  HEENT:grossly intact  Neck:supple, symmetric, trachea midline and no masses   Pulmonary:even and unlabored  Cardiovascular:No edema or pitting edema present  Skin:Normal without rashes or lesions and well hydrated  Psychiatric:Mood and affect appropriate  Neurologic:Cranial Nerves II-XII grossly intact  Musculoskeletal:normal    Imaging

## 2022-08-19 DIAGNOSIS — E11.42 DIABETIC POLYNEUROPATHY ASSOCIATED WITH TYPE 2 DIABETES MELLITUS (HCC): Primary | ICD-10-CM

## 2022-08-19 RX ORDER — DAPAGLIFLOZIN AND METFORMIN HYDROCHLORIDE 5; 500 MG/1; MG/1
TABLET, FILM COATED, EXTENDED RELEASE ORAL
Qty: 30 TABLET | Refills: 3 | Status: SHIPPED | OUTPATIENT
Start: 2022-08-19 | End: 2022-10-26 | Stop reason: ALTCHOICE

## 2022-09-27 ENCOUNTER — TELEPHONE (OUTPATIENT)
Dept: RADIOLOGY | Facility: CLINIC | Age: 47
End: 2022-09-27

## 2022-09-27 NOTE — TELEPHONE ENCOUNTER
See notation from pharmacy  S/w pt,pt says she takes her gabapentin 800mg tid without fail b/c she needs it for her pain on her feet  Pt got "offended" about this message from pharmacy

## 2022-09-27 NOTE — TELEPHONE ENCOUNTER
D O  aware  Will continue with gabapentin as prescribed   Does she need anything? Or was this just for awareness?     Thank you

## 2022-09-27 NOTE — TELEPHONE ENCOUNTER
Dear [facility] administers the Meds on Track Program on behalf of your patient(s) prescription drug coverage  The goal of this program is to collaborate with you to improve your patient(s) adherence to chronic medications  The enclosed report identifies your patient(s) with potential nonadherence that require your attention  Please review the information and consider options that can improve your patients' medication adherence, where appropriate        This report does not take into account patient-specific variables that may factor into your prescribing decisions         If you have already identified the concern, please disregard this notice and continue to monitor your patient for potential issues         If you did not prescribe the identified medication(s), or if the patient is not under your care, please contact the dispensing pharmacy      If you have questions about this report, please contact [employer] at [phone]  We appreciate your continued support          Sincerely,       The [facility] Clinical Team             Case Number: [id number]      Potential Clinical Concern: Medication Non-Adherence for ANTICONVULSANTS         Based on your patients prescription refill history, we are concerned that they may be non-compliant to the prescribed dosing regimen and are under-utilizing this medication  Please consider discussing the importance of medication compliance with your patient  This can help to identify and resolve potential barriers to medication compliance (e g , side effects, lack of perceived value, forgetfulness) and lead to improved therapeutic outcomes   Please review the report below that details your patients medication adherence and prescription refill history during the last 6 months for the identified medication class        The following table shows the pharmacy claims related to the potential clinical concern identified above:       NOTES  Drug Name [address] Date Akira Alba Days Supply Prescriber Name Prescriber Phone # Pharmacy Name Pharmacy Phone #   GABAPENTIN TAB 800MG 2022 [address] [phone] [facility] [address] [id number] [phone]   GABAPENTIN TAB 800MG 2022 90 30 YOLIE WOO [phone] [facility] [id number] [phone]   GABAPENTIN TAB 800MG 2022 90 30     [facility] [id number] [phone]   GABAPENTIN TAB 800MG 2022 90 30     [facility] [address] [phone]   GABAPENTIN TAB 800MG 2022 90 30     [facility] [address] [facility] [phone]         The aggregate data used for this report may have limitations that could cause patients or data to be mistakenly identified [e g , patient is , no longer eligible for pharmacy benefits, already taking the recommended therapy, or not appropriate candidate for the recommended therapy, etc ]  If your patient or the data has been mistakenly identified, please disregard this notice  You do not need to respond to this report  If you did not prescribe the identified medication(s), or if the patient is not under your care, please contact the dispensing pharmacy       This document and others if attached contain information from [facility] that is proprietary, confidential and/or may contain protected health information (PHI)  We are required to safeguard PHI by applicable law  The information in this document is for the sole use of the person(s) or company named above  If you received this document by mistake, please know that sharing, copying, distributing or using information in this document is against the law  If you are not the intended recipient, please notify the sender immediately and return the document(s) by mail to [facility]  , [address] [place] [address]       Data for patient identification is obtained from the member's most currently available pharmacy claims, as provided by the plan's pharmacy benefit manager, [facility], a [address] [facility] [address]       [facility] clinical notifications are sent to you for the safety of your patients  If you want to opt out of these notifications, email [email or web address]  Please note: By opting out, you risk missing important medication alerts that could cause disruption to your patients therapy             All [facility] trademarks and logos are owned by [employer] other brands or product names are the property of their respective owners   © 2022 [employer]  All rights reserved          ALLERGIES, ADVERSE REACTIONS, ALERTS    No Information    Problems    No Information    PROCEDURES    No Information    RESULTS    No Information    MEDICATIONS    No Information  Images  Document Information    Primary Care Provider Other Service Providers Document Coverage Dates   Sohail Sahu DO     Mar  27, 2022March 27, 2022 - Sep  22, 2022September 22, 2022      Organization   [facility]  [phone]  [address] [facility]  [address]        Show All Sections

## 2022-10-01 ENCOUNTER — OFFICE VISIT (OUTPATIENT)
Dept: URGENT CARE | Facility: CLINIC | Age: 47
End: 2022-10-01
Payer: COMMERCIAL

## 2022-10-01 VITALS
BODY MASS INDEX: 26.87 KG/M2 | HEIGHT: 62 IN | OXYGEN SATURATION: 97 % | HEART RATE: 87 BPM | WEIGHT: 146 LBS | RESPIRATION RATE: 20 BRPM | DIASTOLIC BLOOD PRESSURE: 78 MMHG | TEMPERATURE: 97 F | SYSTOLIC BLOOD PRESSURE: 125 MMHG

## 2022-10-01 DIAGNOSIS — L03.031 PARONYCHIA OF GREAT TOE OF RIGHT FOOT: Primary | ICD-10-CM

## 2022-10-01 PROCEDURE — 87205 SMEAR GRAM STAIN: CPT | Performed by: PHYSICIAN ASSISTANT

## 2022-10-01 PROCEDURE — 87070 CULTURE OTHR SPECIMN AEROBIC: CPT | Performed by: PHYSICIAN ASSISTANT

## 2022-10-01 PROCEDURE — 99203 OFFICE O/P NEW LOW 30 MIN: CPT | Performed by: PHYSICIAN ASSISTANT

## 2022-10-01 PROCEDURE — 10060 I&D ABSCESS SIMPLE/SINGLE: CPT | Performed by: PHYSICIAN ASSISTANT

## 2022-10-01 RX ORDER — CEPHALEXIN 500 MG/1
500 CAPSULE ORAL EVERY 8 HOURS SCHEDULED
Qty: 15 CAPSULE | Refills: 0 | Status: SHIPPED | OUTPATIENT
Start: 2022-10-01 | End: 2022-10-06

## 2022-10-01 NOTE — PROGRESS NOTES
[address]        NAME: Lorna Whaley is a 56 y o  female  : 1975    MRN: [id number]  DATE: 2022  TIME: 2:24 PM    Assessment and Plan   Paronychia of great toe of right foot [L03 [id number]]  1  Paronychia of great toe of right foot  cephalexin (KEFLEX) 500 mg capsule    Wound culture and Gram stain     Discussed importance of keeping a very close eye on infected particularly in light of the neuropathy already present  Encouraged to keep clean and dry  Recommend soaking in warm water with Epsom salt  Advised her to always check the temperature of the water with her hand 1st   ER immediately for any worsening, fevers, streaking  She verbalized understanding and is in agreement with plan  Patient Instructions       Follow up with PCP in 3-5 days  Proceed to  ER if symptoms worsen  Chief Complaint     Chief Complaint   Patient presents with    Toe Pain     Right big toe pain started approx 4 days ago  History of Present Illness       Patient is a 45-year-old female past medical history of diabetes, chronic peripheral neuropathy pw R great toe pain x 2 days  Cut toenails 4 days ago and thinks she went too short  No h/o same  No bleeding or drainage  Requesting drainage  Review of Systems   Review of Systems   Constitutional: Negative for chills, diaphoresis and fever  HENT: Negative for congestion, rhinorrhea and sore throat  Eyes: Negative for discharge and itching  Respiratory: Negative for cough, chest tightness, shortness of breath and wheezing  Cardiovascular: Negative for chest pain  Gastrointestinal: Negative for diarrhea, nausea and vomiting  Musculoskeletal: Negative for myalgias  Skin: Positive for color change  Negative for rash  Neurological: Negative for weakness and numbness           Current Medications       Current Outpatient Medications:     cephalexin (KEFLEX) 500 mg capsule, Take 1 capsule (500 mg total) by mouth every 8 (eight) hours for 5 days, Disp: 15 capsule, Rfl: 0    Dapagliflozin-metFORMIN HCl ER (Xigduo XR) 5-500 MG TB24, Take 1 tablet by mouth daily, Disp: 30 tablet, Rfl: 3    albuterol (PROVENTIL HFA,VENTOLIN HFA) 90 mcg/act inhaler, Inhale 2 puffs every 6 (six) hours as needed for wheezing or shortness of breath, Disp: 1 Inhaler, Rfl: 1    ergocalciferol (VITAMIN D2) 50,000 units, 50,000 Units once a week  (Patient not taking: No sig reported), Disp: , Rfl:     escitalopram (LEXAPRO) 10 mg tablet, take 1 tablet by mouth once daily, Disp: 90 tablet, Rfl: 1    gabapentin (NEURONTIN) 800 mg tablet, Take 1 tablet (800 mg total) by mouth 3 (three) times a day, Disp: 90 tablet, Rfl: 5    linaGLIPtin (Tradjenta) 5 MG TABS, Take 5 mg by mouth daily Tradjenta 1 tablet daily, Disp: , Rfl:     Multiple Vitamin (DAILY VALUE MULTIVITAMIN) TABS, Take by mouth daily , Disp: , Rfl:     ondansetron (ZOFRAN-ODT) 8 mg disintegrating tablet, Take 1 tablet (8 mg total) by mouth every 8 (eight) hours as needed for nausea or vomiting, Disp: 30 tablet, Rfl: 0    OneTouch Ultra test strip, Use 1 each 2 (two) times a day, Disp: 180 strip, Rfl: 3    ramipril (ALTACE) 2 5 mg capsule, take 1 capsule by mouth once daily, Disp: 90 capsule, Rfl: 0    repaglinide (PRANDIN) 2 mg tablet, Take 1 tablet (2 mg total) by mouth 3 (three) times a day before meals, Disp: 90 tablet, Rfl: 5    rosuvastatin (CRESTOR) 5 mg tablet, Take 5 mg by mouth daily (Patient not taking: No sig reported), Disp: , Rfl:     Current Allergies     Allergies as of 10/01/2022    (No Known Allergies)            The following portions of the patient's history were reviewed and updated as appropriate: allergies, current medications, past family history, past medical history, past social history, past surgical history and problem list      Past Medical History:   Diagnosis Date    Acute gastritis     [id number] RESOLVED    Allergic     Asthma     Breast pain     [id number] RESOLVED    Diabetes ([facility] )     MELLITUS    Diabetes mellitus ([facility] )     Viral gastroenteritis     [id number] RESOLVED       Past Surgical History:   Procedure Laterality Date    HAND SURGERY      SKIN BIOPSY         Family History   Problem Relation Age of Onset    Hypertension Mother     Breast cancer Family     Colon cancer Family     Diabetes Family         MELLITUS    Lung cancer Family     Mental illness Neg Hx     Substance Abuse Neg Hx          Medications have been verified  Objective   /78   Pulse 87   Temp (!) 97 °F (36 1 °C)   Resp 20   Ht 5' 2" (1 575 m)   Wt 66 2 kg (146 lb)   SpO2 97%   BMI 26 70 kg/m²          Physical Exam     Physical Exam  Vitals and nursing note reviewed  Constitutional:       General: She is not in acute distress  Appearance: Normal appearance  She is not ill-appearing  HENT:      Head: Normocephalic and atraumatic  Cardiovascular:      Rate and Rhythm: Normal rate  Pulmonary:      Effort: Pulmonary effort is normal  No respiratory distress  Musculoskeletal:         General: Tenderness present  Skin:     General: Skin is warm and dry  Capillary Refill: Capillary refill takes less than 2 seconds  Findings: Erythema (paronychia present lateral aspect R great toe) present  Neurological:      Mental Status: She is alert and oriented to person, place, and time  Psychiatric:         Behavior: Behavior normal            Incision and drain    Date/Time: 10/1/2022 2:22 PM  Performed by: Evie Murray PA-C  Authorized by: Evie Murray PA-C   [place] Protocol:  Consent: Verbal consent obtained    Risks and benefits: risks, benefits and alternatives were discussed  Consent given by: patient  Patient identity confirmed: verbally with patient      Patient location:  Clinic  Location:     Type:  Abscess    Size:  0 2    Location:  Lower extremity    Lower extremity location:  R big toe  Pre-procedure details:     Skin preparation:  Antiseptic wash  Procedure details:     Complexity:  Simple    Incision types:  Single straight    Approach:  Open    Incision depth:  Subcutaneous    Drainage:  Purulent    Drainage amount: Moderate    Wound treatment:  Wound left open    Packing materials:  None  Post-procedure details:     Patient tolerance of procedure:   Tolerated well, no immediate complications

## 2022-10-02 LAB
BACTERIA WND AEROBE CULT: NO GROWTH
GRAM STN SPEC: NORMAL

## 2022-10-04 LAB
BACTERIA WND AEROBE CULT: NORMAL
GRAM STN SPEC: NORMAL

## 2022-10-20 ENCOUNTER — TELEPHONE (OUTPATIENT)
Dept: FAMILY MEDICINE CLINIC | Facility: CLINIC | Age: 47
End: 2022-10-20

## 2022-10-20 ENCOUNTER — OFFICE VISIT (OUTPATIENT)
Dept: FAMILY MEDICINE CLINIC | Facility: CLINIC | Age: 47
End: 2022-10-20
Payer: COMMERCIAL

## 2022-10-20 ENCOUNTER — HOSPITAL ENCOUNTER (OUTPATIENT)
Dept: ULTRASOUND IMAGING | Facility: HOSPITAL | Age: 47
Discharge: HOME | End: 2022-10-20
Payer: COMMERCIAL

## 2022-10-20 VITALS
SYSTOLIC BLOOD PRESSURE: 132 MMHG | OXYGEN SATURATION: 99 % | WEIGHT: 142 LBS | HEART RATE: 89 BPM | RESPIRATION RATE: 18 BRPM | BODY MASS INDEX: 26.13 KG/M2 | DIASTOLIC BLOOD PRESSURE: 78 MMHG | HEIGHT: 62 IN | TEMPERATURE: 97.6 F

## 2022-10-20 DIAGNOSIS — F32.A ANXIETY AND DEPRESSION: ICD-10-CM

## 2022-10-20 DIAGNOSIS — R10.11 RUQ ABDOMINAL PAIN: ICD-10-CM

## 2022-10-20 DIAGNOSIS — R10.11 RUQ ABDOMINAL PAIN: Primary | ICD-10-CM

## 2022-10-20 DIAGNOSIS — F41.9 ANXIETY AND DEPRESSION: ICD-10-CM

## 2022-10-20 DIAGNOSIS — E13.42 DIABETIC POLYNEUROPATHY ASSOCIATED WITH OTHER SPECIFIED DIABETES MELLITUS (HCC): ICD-10-CM

## 2022-10-20 PROBLEM — U07.1 COVID-19 VIRUS INFECTION: Status: RESOLVED | Noted: 2021-12-01 | Resolved: 2022-10-20

## 2022-10-20 PROCEDURE — 99214 OFFICE O/P EST MOD 30 MIN: CPT | Performed by: NURSE PRACTITIONER

## 2022-10-20 PROCEDURE — 76705 ECHO EXAM OF ABDOMEN: CPT

## 2022-10-20 NOTE — TELEPHONE ENCOUNTER
Ultrasound is normal, please let patient know  She can discuss further options with Marlin if she is continuing to have pain

## 2022-10-20 NOTE — TELEPHONE ENCOUNTER
Called patient to let her know but she said Parveen Flanagan has already called her  NFA    Evie Harrington

## 2022-10-20 NOTE — PROGRESS NOTES
Assessment/Plan:    Labs drawn  RUQ US r/o cholecystitis today  Will f/u with results      1  RUQ abdominal pain  -     US abdomen limited; Future; Expected date: 10/20/2022  -     CBC and differential  -     Comprehensive metabolic panel; Future  -     Lipase    2  Diabetic polyneuropathy associated with other specified diabetes mellitus [facility])  Assessment & Plan:  Labs drawn, sees endo    Lab Results   Component Value Date    HGBA1C 10 2 (H) 05/23/2022       Orders:  -     Hemoglobin A1C    3  Anxiety and depression  Assessment & Plan:  Stable on Lexapro        BMI Counseling: Body mass index is 25 97 kg/m²  The BMI is above normal  Nutrition recommendations include consuming healthier snacks  Exercise recommendations include moderate physical activity 150 minutes/week  Rationale for BMI follow-up plan is due to patient being overweight or obese  There are no Patient Instructions on file for this visit  Return if symptoms worsen or fail to improve  Subjective:      Patient ID: Rosa Spivey is a 56 y o  female  Chief Complaint   Patient presents with   • Abdominal Pain     Level 5 in pain hurts when push in  bleching a lot  adelfo       Has periumbilical pain that started about 3 days ago  Pain radiates into her back  Increased gas  Nausea, but no vomiting  Pain is worse with eating and painful when she pushes on it  Watery diarrhea today  Has not taken anything OTC      The following portions of the patient's history were reviewed and updated as appropriate: allergies, current medications, past family history, past medical history, past social history, past surgical history and problem list     Review of Systems   Constitutional: Negative for chills and fever  Respiratory: Negative for cough and shortness of breath  Cardiovascular: Negative for chest pain and leg swelling  Gastrointestinal: Positive for abdominal pain, diarrhea and nausea   Negative for blood in stool and vomiting  Genitourinary: Negative  Neurological: Negative  Current Outpatient Medications   Medication Sig Dispense Refill   • albuterol (PROVENTIL HFA,VENTOLIN HFA) 90 mcg/act inhaler Inhale 2 puffs every 6 (six) hours as needed for wheezing or shortness of breath 1 Inhaler 1   • Dapagliflozin-metFORMIN HCl ER (Xigduo XR) 5-500 MG TB24 Take 1 tablet by mouth daily 30 tablet 3   • escitalopram (LEXAPRO) 10 mg tablet take 1 tablet by mouth once daily 90 tablet 1   • gabapentin (NEURONTIN) 800 mg tablet Take 1 tablet (800 mg total) by mouth 3 (three) times a day 90 tablet 5   • linaGLIPtin (Tradjenta) 5 MG TABS Take 5 mg by mouth daily Tradjenta 1 tablet daily     • Multiple Vitamin (DAILY VALUE MULTIVITAMIN) TABS Take by mouth daily      • ondansetron (ZOFRAN-ODT) 8 mg disintegrating tablet Take 1 tablet (8 mg total) by mouth every 8 (eight) hours as needed for nausea or vomiting 30 tablet 0   • OneTouch Ultra test strip Use 1 each 2 (two) times a day 180 strip 3   • ramipril (ALTACE) 2 5 mg capsule take 1 capsule by mouth once daily 90 capsule 0   • repaglinide (PRANDIN) 2 mg tablet Take 1 tablet (2 mg total) by mouth 3 (three) times a day before meals 90 tablet 5   • ergocalciferol (VITAMIN D2) 50,000 units 50,000 Units once a week  (Patient not taking: No sig reported)     • rosuvastatin (CRESTOR) 5 mg tablet Take 5 mg by mouth daily (Patient not taking: No sig reported)       No current facility-administered medications for this visit  Objective:    /78   Pulse 89   Temp 97 6 °F (36 4 °C)   Resp 18   Ht 5' 2" (1 575 m)   Wt 64 4 kg (142 lb)   LMP 10/02/2022 (Approximate)   SpO2 99%   BMI 25 97 kg/m²        Physical Exam  Vitals and nursing note reviewed  Constitutional:       Appearance: She is well-developed  HENT:      Head: Normocephalic and atraumatic  Cardiovascular:      Rate and Rhythm: Normal rate and regular rhythm  Heart sounds: Normal heart sounds   No murmur heard   Pulmonary:      Effort: Pulmonary effort is normal       Breath sounds: Normal breath sounds  Abdominal:      Tenderness: There is abdominal tenderness in the right upper quadrant  Positive signs include Bernal's sign  Skin:     General: Skin is warm and dry  Neurological:      Mental Status: She is alert                  Mel Modest

## 2022-10-21 ENCOUNTER — APPOINTMENT (EMERGENCY)
Dept: RADIOLOGY | Facility: HOSPITAL | Age: 47
End: 2022-10-21
Payer: COMMERCIAL

## 2022-10-21 ENCOUNTER — TELEPHONE (OUTPATIENT)
Dept: FAMILY MEDICINE CLINIC | Facility: CLINIC | Age: 47
End: 2022-10-21

## 2022-10-21 ENCOUNTER — HOSPITAL ENCOUNTER (INPATIENT)
Facility: HOSPITAL | Age: 47
LOS: 1 days | Discharge: HOME | End: 2022-10-22
Attending: EMERGENCY MEDICINE | Admitting: FAMILY MEDICINE
Payer: COMMERCIAL

## 2022-10-21 DIAGNOSIS — K85.90 PANCREATITIS: Primary | ICD-10-CM

## 2022-10-21 DIAGNOSIS — E87.1 HYPONATREMIA: ICD-10-CM

## 2022-10-21 DIAGNOSIS — F17.200 SMOKER: ICD-10-CM

## 2022-10-21 DIAGNOSIS — R74.8 ELEVATED LIPASE: ICD-10-CM

## 2022-10-21 DIAGNOSIS — R10.9 ABDOMINAL PAIN: ICD-10-CM

## 2022-10-21 LAB
ALBUMIN SERPL BCP-MCNC: 4.3 G/DL (ref 3.5–5)
ALBUMIN SERPL-MCNC: 4.9 G/DL (ref 3.8–4.8)
ALBUMIN/GLOB SERPL: 1.8 {RATIO} (ref 1.2–2.2)
ALP SERPL-CCNC: 79 IU/L (ref 44–121)
ALP SERPL-CCNC: 82 U/L (ref 46–116)
ALT SERPL W P-5'-P-CCNC: 16 U/L (ref 12–78)
ALT SERPL-CCNC: 12 IU/L (ref 0–32)
ANION GAP SERPL CALCULATED.3IONS-SCNC: 8 MMOL/L (ref 4–13)
AST SERPL W P-5'-P-CCNC: 12 U/L (ref 5–45)
AST SERPL-CCNC: 24 IU/L (ref 0–40)
BACTERIA UR QL AUTO: ABNORMAL /HPF
BASOPHILS # BLD AUTO: 0.1 THOUSANDS/ÂΜL (ref 0–0.1)
BASOPHILS # BLD AUTO: 0.1 X10E3/UL (ref 0–0.2)
BASOPHILS NFR BLD AUTO: 1 %
BASOPHILS NFR BLD AUTO: 1 % (ref 0–1)
BILIRUB SERPL-MCNC: 0.44 MG/DL (ref 0.2–1)
BILIRUB SERPL-MCNC: 0.5 MG/DL (ref 0–1.2)
BILIRUB UR QL STRIP: NEGATIVE
BUN SERPL-MCNC: 14 MG/DL (ref 5–25)
BUN SERPL-MCNC: 9 MG/DL (ref 6–24)
BUN/CREAT SERPL: 13 (ref 9–23)
CALCIUM SERPL-MCNC: 9.3 MG/DL (ref 8.3–10.1)
CALCIUM SERPL-MCNC: 9.3 MG/DL (ref 8.7–10.2)
CHLORIDE SERPL-SCNC: 95 MMOL/L (ref 96–108)
CHLORIDE SERPL-SCNC: 98 MMOL/L (ref 96–106)
CLARITY UR: CLEAR
CO2 SERPL-SCNC: 19 MMOL/L (ref 20–29)
CO2 SERPL-SCNC: 24 MMOL/L (ref 21–32)
COLOR UR: YELLOW
CREAT SERPL-MCNC: 0.7 MG/DL (ref 0.57–1)
CREAT SERPL-MCNC: 0.94 MG/DL (ref 0.6–1.3)
EGFR: 108 ML/MIN/1.73
EOSINOPHIL # BLD AUTO: 0.1 X10E3/UL (ref 0–0.4)
EOSINOPHIL # BLD AUTO: 0.17 THOUSAND/ÂΜL (ref 0–0.61)
EOSINOPHIL NFR BLD AUTO: 1 %
EOSINOPHIL NFR BLD AUTO: 1 % (ref 0–6)
ERYTHROCYTE [DISTWIDTH] IN BLOOD BY AUTOMATED COUNT: 14 % (ref 11.7–15.4)
ERYTHROCYTE [DISTWIDTH] IN BLOOD BY AUTOMATED COUNT: 15.3 % (ref 11.6–15.1)
EST. AVERAGE GLUCOSE BLD GHB EST-MCNC: 246 MG/DL
FINE GRAN CASTS URNS QL MICRO: ABNORMAL /LPF
GFR SERPL CREATININE-BSD FRML MDRD: 72 ML/MIN/1.73SQ M
GLOBULIN SER-MCNC: 2.7 G/DL (ref 1.5–4.5)
GLUCOSE SERPL-MCNC: 170 MG/DL (ref 65–140)
GLUCOSE SERPL-MCNC: 171 MG/DL (ref 70–99)
GLUCOSE SERPL-MCNC: 199 MG/DL (ref 65–140)
GLUCOSE UR STRIP-MCNC: ABNORMAL MG/DL
HBA1C MFR BLD: 10.2 % (ref 4.8–5.6)
HCT VFR BLD AUTO: 44.8 % (ref 34–46.6)
HCT VFR BLD AUTO: 49.7 % (ref 34.8–46.1)
HGB BLD-MCNC: 15 G/DL (ref 11.1–15.9)
HGB BLD-MCNC: 16.1 G/DL (ref 11.5–15.4)
HGB UR QL STRIP.AUTO: NEGATIVE
HYALINE CASTS #/AREA URNS LPF: ABNORMAL /LPF
IMM GRANULOCYTES # BLD AUTO: 0.09 THOUSAND/UL (ref 0–0.2)
IMM GRANULOCYTES # BLD: 0.1 X10E3/UL (ref 0–0.1)
IMM GRANULOCYTES NFR BLD AUTO: 1 % (ref 0–2)
IMM GRANULOCYTES NFR BLD: 1 %
KETONES UR STRIP-MCNC: ABNORMAL MG/DL
LEUKOCYTE ESTERASE UR QL STRIP: ABNORMAL
LIPASE SERPL-CCNC: 1674 U/L (ref 73–393)
LIPASE SERPL-CCNC: 653 U/L (ref 14–72)
LYMPHOCYTES # BLD AUTO: 1.33 THOUSANDS/ÂΜL (ref 0.6–4.47)
LYMPHOCYTES # BLD AUTO: 1.7 X10E3/UL (ref 0.7–3.1)
LYMPHOCYTES NFR BLD AUTO: 11 % (ref 14–44)
LYMPHOCYTES NFR BLD AUTO: 12 %
MCH RBC QN AUTO: 30.8 PG (ref 26.8–34.3)
MCH RBC QN AUTO: 31 PG (ref 26.6–33)
MCHC RBC AUTO-ENTMCNC: 32.4 G/DL (ref 31.4–37.4)
MCHC RBC AUTO-ENTMCNC: 33.5 G/DL (ref 31.5–35.7)
MCV RBC AUTO: 93 FL (ref 79–97)
MCV RBC AUTO: 95 FL (ref 82–98)
MONOCYTES # BLD AUTO: 0.74 THOUSAND/ÂΜL (ref 0.17–1.22)
MONOCYTES # BLD AUTO: 0.8 X10E3/UL (ref 0.1–0.9)
MONOCYTES NFR BLD AUTO: 6 %
MONOCYTES NFR BLD AUTO: 6 % (ref 4–12)
NEUTROPHILS # BLD AUTO: 11.6 X10E3/UL (ref 1.4–7)
NEUTROPHILS # BLD AUTO: 9.56 THOUSANDS/ÂΜL (ref 1.85–7.62)
NEUTROPHILS NFR BLD AUTO: 79 %
NEUTS SEG NFR BLD AUTO: 80 % (ref 43–75)
NITRITE UR QL STRIP: NEGATIVE
NON-SQ EPI CELLS URNS QL MICRO: ABNORMAL /HPF
NRBC BLD AUTO-RTO: 0 /100 WBCS
PH UR STRIP.AUTO: 5.5 [PH]
PLATELET # BLD AUTO: 235 X10E3/UL (ref 150–450)
PLATELET # BLD AUTO: 239 THOUSANDS/UL (ref 149–390)
PMV BLD AUTO: 11.1 FL (ref 8.9–12.7)
POTASSIUM SERPL-SCNC: 4.2 MMOL/L (ref 3.5–5.3)
POTASSIUM SERPL-SCNC: 4.7 MMOL/L (ref 3.5–5.2)
PROT SERPL-MCNC: 7.6 G/DL (ref 6–8.5)
PROT SERPL-MCNC: 9.1 G/DL (ref 6.4–8.4)
PROT UR STRIP-MCNC: NEGATIVE MG/DL
RBC # BLD AUTO: 4.84 X10E6/UL (ref 3.77–5.28)
RBC # BLD AUTO: 5.23 MILLION/UL (ref 3.81–5.12)
RBC #/AREA URNS AUTO: ABNORMAL /HPF
SODIUM SERPL-SCNC: 127 MMOL/L (ref 135–147)
SODIUM SERPL-SCNC: 133 MMOL/L (ref 134–144)
SP GR UR STRIP.AUTO: 1.02 (ref 1–1.03)
UROBILINOGEN UR QL STRIP.AUTO: 0.2 E.U./DL
WBC # BLD AUTO: 11.99 THOUSAND/UL (ref 4.31–10.16)
WBC # BLD AUTO: 14.4 X10E3/UL (ref 3.4–10.8)
WBC #/AREA URNS AUTO: ABNORMAL /HPF

## 2022-10-21 PROCEDURE — 96374 THER/PROPH/DIAG INJ IV PUSH: CPT

## 2022-10-21 PROCEDURE — 85025 COMPLETE CBC W/AUTO DIFF WBC: CPT | Performed by: EMERGENCY MEDICINE

## 2022-10-21 PROCEDURE — 96361 HYDRATE IV INFUSION ADD-ON: CPT

## 2022-10-21 PROCEDURE — 82948 REAGENT STRIP/BLOOD GLUCOSE: CPT

## 2022-10-21 PROCEDURE — 80053 COMPREHEN METABOLIC PANEL: CPT | Performed by: EMERGENCY MEDICINE

## 2022-10-21 PROCEDURE — 99285 EMERGENCY DEPT VISIT HI MDM: CPT | Performed by: EMERGENCY MEDICINE

## 2022-10-21 PROCEDURE — 74177 CT ABD & PELVIS W/CONTRAST: CPT

## 2022-10-21 PROCEDURE — 83690 ASSAY OF LIPASE: CPT | Performed by: EMERGENCY MEDICINE

## 2022-10-21 PROCEDURE — 81001 URINALYSIS AUTO W/SCOPE: CPT | Performed by: EMERGENCY MEDICINE

## 2022-10-21 PROCEDURE — 96375 TX/PRO/DX INJ NEW DRUG ADDON: CPT

## 2022-10-21 PROCEDURE — 99284 EMERGENCY DEPT VISIT MOD MDM: CPT

## 2022-10-21 PROCEDURE — C9113 INJ PANTOPRAZOLE SODIUM, VIA: HCPCS | Performed by: EMERGENCY MEDICINE

## 2022-10-21 PROCEDURE — 99223 1ST HOSP IP/OBS HIGH 75: CPT | Performed by: FAMILY MEDICINE

## 2022-10-21 PROCEDURE — 36415 COLL VENOUS BLD VENIPUNCTURE: CPT | Performed by: EMERGENCY MEDICINE

## 2022-10-21 RX ORDER — PANTOPRAZOLE SODIUM 40 MG/10ML
40 INJECTION, POWDER, LYOPHILIZED, FOR SOLUTION INTRAVENOUS ONCE
Status: COMPLETED | OUTPATIENT
Start: 2022-10-21 | End: 2022-10-21

## 2022-10-21 RX ORDER — SODIUM CHLORIDE 9 MG/ML
200 INJECTION, SOLUTION INTRAVENOUS CONTINUOUS
Status: DISCONTINUED | OUTPATIENT
Start: 2022-10-21 | End: 2022-10-21

## 2022-10-21 RX ORDER — LISINOPRIL 10 MG/1
10 TABLET ORAL DAILY
Refills: 0 | Status: DISCONTINUED | OUTPATIENT
Start: 2022-10-22 | End: 2022-10-22 | Stop reason: HOSPADM

## 2022-10-21 RX ORDER — INSULIN LISPRO 100 [IU]/ML
1-5 INJECTION, SOLUTION INTRAVENOUS; SUBCUTANEOUS EVERY 6 HOURS SCHEDULED
Status: DISCONTINUED | OUTPATIENT
Start: 2022-10-21 | End: 2022-10-22 | Stop reason: HOSPADM

## 2022-10-21 RX ORDER — ACETAMINOPHEN 325 MG/1
650 TABLET ORAL EVERY 6 HOURS PRN
Status: DISCONTINUED | OUTPATIENT
Start: 2022-10-21 | End: 2022-10-22 | Stop reason: HOSPADM

## 2022-10-21 RX ORDER — SODIUM CHLORIDE 9 MG/ML
125 INJECTION, SOLUTION INTRAVENOUS CONTINUOUS
Status: DISCONTINUED | OUTPATIENT
Start: 2022-10-21 | End: 2022-10-21

## 2022-10-21 RX ORDER — NICOTINE 21 MG/24HR
1 PATCH, TRANSDERMAL 24 HOURS TRANSDERMAL DAILY
Status: DISCONTINUED | OUTPATIENT
Start: 2022-10-22 | End: 2022-10-22 | Stop reason: HOSPADM

## 2022-10-21 RX ORDER — ONDANSETRON 2 MG/ML
4 INJECTION INTRAMUSCULAR; INTRAVENOUS ONCE
Status: COMPLETED | OUTPATIENT
Start: 2022-10-21 | End: 2022-10-21

## 2022-10-21 RX ORDER — SODIUM CHLORIDE, SODIUM LACTATE, POTASSIUM CHLORIDE, CALCIUM CHLORIDE 600; 310; 30; 20 MG/100ML; MG/100ML; MG/100ML; MG/100ML
150 INJECTION, SOLUTION INTRAVENOUS CONTINUOUS
Status: DISCONTINUED | OUTPATIENT
Start: 2022-10-21 | End: 2022-10-22

## 2022-10-21 RX ORDER — ONDANSETRON 2 MG/ML
4 INJECTION INTRAMUSCULAR; INTRAVENOUS EVERY 6 HOURS PRN
Status: DISCONTINUED | OUTPATIENT
Start: 2022-10-21 | End: 2022-10-22 | Stop reason: HOSPADM

## 2022-10-21 RX ORDER — MORPHINE SULFATE 4 MG/ML
4 INJECTION, SOLUTION INTRAMUSCULAR; INTRAVENOUS EVERY 6 HOURS PRN
Status: DISCONTINUED | OUTPATIENT
Start: 2022-10-21 | End: 2022-10-22 | Stop reason: HOSPADM

## 2022-10-21 RX ORDER — GABAPENTIN 400 MG/1
800 CAPSULE ORAL 3 TIMES DAILY
Status: DISCONTINUED | OUTPATIENT
Start: 2022-10-21 | End: 2022-10-22 | Stop reason: HOSPADM

## 2022-10-21 RX ORDER — ESCITALOPRAM OXALATE 10 MG/1
10 TABLET ORAL DAILY
Status: DISCONTINUED | OUTPATIENT
Start: 2022-10-22 | End: 2022-10-22 | Stop reason: HOSPADM

## 2022-10-21 RX ORDER — INSULIN LISPRO 100 [IU]/ML
1-5 INJECTION, SOLUTION INTRAVENOUS; SUBCUTANEOUS
Status: DISCONTINUED | OUTPATIENT
Start: 2022-10-22 | End: 2022-10-21

## 2022-10-21 RX ORDER — ALBUTEROL SULFATE 90 UG/1
2 AEROSOL, METERED RESPIRATORY (INHALATION) EVERY 6 HOURS PRN
Status: DISCONTINUED | OUTPATIENT
Start: 2022-10-21 | End: 2022-10-22 | Stop reason: HOSPADM

## 2022-10-21 RX ADMIN — INSULIN LISPRO 1 UNITS: 100 INJECTION, SOLUTION INTRAVENOUS; SUBCUTANEOUS at 21:43

## 2022-10-21 RX ADMIN — GABAPENTIN 800 MG: 400 CAPSULE ORAL at 21:43

## 2022-10-21 RX ADMIN — ONDANSETRON 4 MG: 2 INJECTION INTRAMUSCULAR; INTRAVENOUS at 12:49

## 2022-10-21 RX ADMIN — SODIUM CHLORIDE, SODIUM LACTATE, POTASSIUM CHLORIDE, AND CALCIUM CHLORIDE 150 ML/HR: .6; .31; .03; .02 INJECTION, SOLUTION INTRAVENOUS at 21:43

## 2022-10-21 RX ADMIN — SODIUM CHLORIDE 125 ML/HR: 0.9 INJECTION, SOLUTION INTRAVENOUS at 16:48

## 2022-10-21 RX ADMIN — SODIUM CHLORIDE 1000 ML: 0.9 INJECTION, SOLUTION INTRAVENOUS at 12:50

## 2022-10-21 RX ADMIN — IOHEXOL 100 ML: 300 INJECTION, SOLUTION INTRAVENOUS at 12:49

## 2022-10-21 RX ADMIN — ONDANSETRON 4 MG: 2 INJECTION INTRAMUSCULAR; INTRAVENOUS at 16:48

## 2022-10-21 RX ADMIN — PANTOPRAZOLE SODIUM 40 MG: 40 INJECTION, POWDER, FOR SOLUTION INTRAVENOUS at 12:49

## 2022-10-21 NOTE — ED PROVIDER NOTES
History  Chief Complaint   Patient presents with   • Abnormal Lab     Pain in upper R to back with nausea and diarrhea for a few days  Had labs done yesterday, told by Dr to go to ed poss pancreatitis     Patient presents for evaluation of upper abdominal pain worse on the right upper quadrant radiates to her back  She has also had some diarrhea for last few days  She saw her primary care physician outpatient blood work and an ultrasound done yesterday  Patient was called by her primary care physician told to come to the ER for possible pancreatitis  Patient states she vomited in the waiting room and although she is hungry every time he tries a year drink anything she gets sharp stabbing pain in her epigastric area along with nausea vomiting  No other apparent modifying factors for symptoms  History provided by:  Patient   used: No        Prior to Admission Medications   Prescriptions Last Dose Informant Patient Reported? Taking?    Dapagliflozin-metFORMIN HCl ER (Xigduo XR) 5-500 MG TB24 10/20/2022 at Unknown time  No Yes   Sig: Take 1 tablet by mouth daily   Multiple Vitamin (DAILY VALUE MULTIVITAMIN) TABS 10/20/2022 at Unknown time Self Yes Yes   Sig: Take by mouth daily    OneTouch Ultra test strip  Self No No   Sig: Use 1 each 2 (two) times a day   albuterol (PROVENTIL HFA,VENTOLIN HFA) 90 mcg/act inhaler More than a month at Unknown time Self No No   Sig: Inhale 2 puffs every 6 (six) hours as needed for wheezing or shortness of breath   ergocalciferol (VITAMIN D2) 50,000 units Not Taking at Unknown time  Yes No   Si,000 Units once a week    Patient not taking: No sig reported   escitalopram (LEXAPRO) 10 mg tablet Past Month at Unknown time Self No Yes   Sig: take 1 tablet by mouth once daily   gabapentin (NEURONTIN) 800 mg tablet 10/20/2022 at Unknown time Self No Yes   Sig: Take 1 tablet (800 mg total) by mouth 3 (three) times a day   linaGLIPtin (Tradjenta) 5 MG TABS 10/20/2022 at Unknown time Self Yes Yes   Sig: Take 5 mg by mouth daily Tradjenta 1 tablet daily   ondansetron (ZOFRAN-ODT) 8 mg disintegrating tablet 10/21/2022 at Unknown time Self No Yes   Sig: Take 1 tablet (8 mg total) by mouth every 8 (eight) hours as needed for nausea or vomiting   ramipril (ALTACE) 2 5 mg capsule 10/20/2022 at Unknown time Self No Yes   Sig: take 1 capsule by mouth once daily   repaglinide (PRANDIN) 2 mg tablet 10/20/2022 at Unknown time Self No Yes   Sig: Take 1 tablet (2 mg total) by mouth 3 (three) times a day before meals   rosuvastatin (CRESTOR) 5 mg tablet Not Taking at Unknown time  Yes No   Sig: Take 5 mg by mouth daily   Patient not taking: No sig reported      Facility-Administered Medications: None       Past Medical History:   Diagnosis Date   • Acute gastritis     [id number] RESOLVED   • Allergic    • Asthma    • Breast pain     [id number] RESOLVED   • COVID-19 virus infection 12/1/2021   • Diabetes ([facility] [address] )     MELLITUS   • Diabetes mellitus ([facility] [address] )    • Viral gastroenteritis     [id number] RESOLVED       Past Surgical History:   Procedure Laterality Date   • HAND SURGERY     • SKIN BIOPSY         Family History   Problem Relation Age of Onset   • Hypertension Mother    • Breast cancer Family    • Colon cancer Family    • Diabetes Family         MELLITUS   • Lung cancer Family    • Mental illness Neg Hx    • Substance Abuse Neg Hx      I have reviewed and agree with the history as documented      E-Cigarette/Vaping   • E-Cigarette Use Never User      E-Cigarette/Vaping Substances   • Nicotine No    • THC No    • CBD No    • Flavoring No    • Other No    • Unknown No      Social History     Tobacco Use   • Smoking status: Current Every Day Smoker     Packs/day: 0 50   • Smokeless tobacco: Never Used   Vaping Use   • Vaping Use: Never used   Substance Use Topics   • Alcohol use: Not Currently     Comment: 2-3 drinks    • Drug use: No       Review of Systems   Constitutional: Negative for chills and fever  HENT: Negative for ear pain and sore throat  Eyes: Negative for pain and visual disturbance  Respiratory: Negative for cough and shortness of breath  Cardiovascular: Negative for chest pain and palpitations  Gastrointestinal: Positive for abdominal pain, diarrhea, nausea and vomiting  Negative for blood in stool  Genitourinary: Negative for dysuria and hematuria  Musculoskeletal: Negative for arthralgias and back pain  Skin: Negative for color change and rash  Neurological: Negative for seizures and syncope  All other systems reviewed and are negative  Physical Exam  Physical Exam  Vitals and nursing note reviewed  Constitutional:       General: She is not in acute distress  HENT:      Head: Atraumatic  Right Ear: External ear normal       Left Ear: External ear normal       Nose: Nose normal       Mouth/Throat:      Mouth: Mucous membranes are moist       Pharynx: Oropharynx is clear  Eyes:      General: No scleral icterus  Conjunctiva/sclera: Conjunctivae normal    Cardiovascular:      Rate and Rhythm: Regular rhythm  Tachycardia present  Pulses: Normal pulses  Pulmonary:      Effort: Pulmonary effort is normal  No respiratory distress  Breath sounds: Normal breath sounds  Abdominal:      General: Abdomen is flat  Bowel sounds are normal  There is no distension  Palpations: Abdomen is soft  Tenderness: There is abdominal tenderness  There is no guarding or rebound  Comments: Epigastric and right upper quadrant tenderness   Musculoskeletal:         General: No deformity  Normal range of motion  Skin:     Capillary Refill: Capillary refill takes less than 2 seconds  Findings: No rash  Neurological:      General: No focal deficit present  Mental Status: She is alert and oriented to person, place, and time           Vital Signs  ED Triage Vitals [10/21/22 1131]   Temperature Pulse Respirations Blood Pressure SpO2 98 8 °F (37 1 °C) (!) 116 22 130/84 100 %      Temp Source Heart Rate Source Patient Position - Orthostatic VS BP Location FiO2 (%)   Tympanic Monitor Sitting Right arm --      Pain Score       5           Vitals:    10/21/22 1131 10/21/22 1516 10/21/22 1518 10/21/22 1908   BP: 130/84 124/80 124/80 113/65   Pulse: (!) 116  89 82   Patient Position - Orthostatic VS: Sitting            Visual Acuity      ED Medications  Medications   ondansetron (ZOFRAN) injection 4 mg (4 mg Intravenous Given 10/21/22 1648)   sodium chloride 0 9 % infusion (200 mL/hr Intravenous Rate/Dose Change 10/21/22 1804)   gabapentin (NEURONTIN) capsule 800 mg (has no administration in time range)   multivitamin stress formula tablet 1 tablet (has no administration in time range)   albuterol (PROVENTIL HFA,VENTOLIN HFA) inhaler 2 puff (has no administration in time range)   escitalopram (LEXAPRO) tablet 10 mg (has no administration in time range)   lisinopril (ZESTRIL) tablet 10 mg (has no administration in time range)   nicotine (NICODERM CQ) 14 mg/24hr TD 24 hr patch 1 patch (has no administration in time range)   sodium chloride 0 9 % bolus 1,000 mL (1,000 mL Intravenous New Bag 10/21/22 1250)   ondansetron (ZOFRAN) injection 4 mg (4 mg Intravenous Given 10/21/22 1249)   pantoprazole (PROTONIX) injection 40 mg (40 mg Intravenous Given 10/21/22 1249)   iohexol (OMNIPAQUE) 300 mg/mL injection 100 mL (100 mL Intravenous Given 10/21/22 1249)       Diagnostic Studies  Results Reviewed     Procedure Component Value Units Date/Time    Lipase [[id number]]  (Abnormal) Collected: 10/21/22 1235    Lab Status: Final result Specimen: Blood from Arm, Right Updated: 10/21/22 1326     Lipase 1,674 u/L     Comprehensive metabolic panel [[id number]]  (Abnormal) Collected: 10/21/22 1235    Lab Status: Final result Specimen: Blood from Arm, Right Updated: 10/21/22 1311     Sodium 127 mmol/L      Potassium 4 2 mmol/L      Chloride 95 mmol/L      CO2 24 mmol/L ANION GAP 8 mmol/L      BUN 14 mg/dL      Creatinine 0 94 mg/dL      Glucose 199 mg/dL      Calcium 9 3 mg/dL      AST 12 U/L      ALT 16 U/L      Alkaline Phosphatase 82 U/L      Total Protein 9 1 g/dL      Albumin 4 3 g/dL      Total Bilirubin 0 44 mg/dL      eGFR 72 ml/min/1 73sq m     Narrative:      [place] guidelines for Chronic Kidney Disease (CKD):   •  Stage 1 with normal or high GFR (GFR > 90 mL/min/1 73 square meters)  •  Stage 2 Mild CKD (GFR = 60-89 mL/min/1 73 square meters)  •  Stage 3A Moderate CKD (GFR = 45-59 mL/min/1 73 square meters)  •  Stage 3B Moderate CKD (GFR = 30-44 mL/min/1 73 square meters)  •  Stage 4 Severe CKD (GFR = 15-29 mL/min/1 73 square meters)  •  Stage 5 End Stage CKD (GFR <15 mL/min/1 73 square meters)  Note: GFR calculation is accurate only with a steady state creatinine    Urine Microscopic [[id number]]  (Abnormal) Collected: 10/21/22 1235    Lab Status: Final result Specimen: Urine, Clean Catch Updated: 10/21/22 1301     RBC, UA 0-1 /hpf      WBC, UA 1-2 /hpf      Epithelial Cells Occasional /hpf      Bacteria, UA Moderate /hpf      Hyaline Casts, UA 0-1 /lpf      Fine granular casts 5-10 /lpf     UA (URINE) with reflex to Scope [[id number]]  (Abnormal) Collected: 10/21/22 1235    Lab Status: Final result Specimen: Urine, Clean Catch Updated: 10/21/22 1252     Color, UA Yellow     Clarity, UA Clear     Specific Gravity, UA 1 025     pH, UA 5 5     Leukocytes, UA Elevated glucose may cause decreased leukocyte values   See urine microscopic for [facility] result/     Nitrite, UA Negative     Protein, UA Negative mg/dl      Glucose, UA >=1000 (1%) mg/dl      Ketones, UA 40 (2+) mg/dl      Urobilinogen, UA 0 2 E U /dl      Bilirubin, UA Negative     Occult Blood, UA Negative    CBC and differential [[id number]]  (Abnormal) Collected: 10/21/22 1235    Lab Status: Final result Specimen: Blood from Arm, Right Updated: 10/21/22 1244     WBC 11 99 Thousand/uL RBC 5 23 Million/uL      Hemoglobin 16 1 g/dL      Hematocrit 49 7 %      MCV 95 fL      MCH 30 8 pg      MCHC 32 4 g/dL      RDW 15 3 %      MPV 11 1 fL      Platelets 656 Thousands/uL      nRBC 0 /100 WBCs      Neutrophils Relative 80 %      Immat GRANS % 1 %      Lymphocytes Relative 11 %      Monocytes Relative 6 %      Eosinophils Relative 1 %      Basophils Relative 1 %      Neutrophils Absolute 9 56 Thousands/µL      Immature Grans Absolute 0 09 Thousand/uL      Lymphocytes Absolute 1 33 Thousands/µL      Monocytes Absolute 0 74 Thousand/µL      Eosinophils Absolute 0 17 Thousand/µL      Basophils Absolute 0 10 Thousands/µL                  CT abdomen pelvis with contrast   Final Result by Jewels Sims MD (10/21 1311)      Mild inflammatory stranding right anterior abdominal wall  3 mm pulmonary nodule left lower lobe  If patient is at high risk for malignancy, recommend short-term follow-up in 12 months  Workstation performed: [id number]                    Procedures  Procedures         ED Course  ED Course as of 10/21/22 2002   Fri Oct 21, 2022   1249 WBC(!): 11 99  Decreased from 14 on lab work done yesterday  1313 Sodium(!): 127  Corrected Na for glucose 129                                             MDM  Number of Diagnoses or Management Options  Hyponatremia  Pancreatitis  Diagnosis management comments: Pulse ox 100% on room air indicating adequate oxygenation  Lab work and ultrasound results reviewed from outpatient testing yesterday  Patient did have elevated WBC of 14 as well as elevated lipase of 653  Will recheck lab work today than assess for any changes  Ultrasound was negative for any gallbladder pathology showing a gallstone  Will obtain CT scan to rule out other pathologies as she continues to have symptoms and inability to tolerate p o  CT and lab work discussed with patient  Lipase is increased in size over 3 times the normal limit    This is consistent with acute pancreatitis  CT scan also showed nonspecific inflammation of the right abdominal wall is unclear origin as patient has no physical exam findings correlate this although she does have pain in this area  Amount and/or Complexity of Data Reviewed  Clinical lab tests: ordered and reviewed  Tests in the radiology section of CPT®: ordered and reviewed  Decide to obtain previous medical records or to obtain history from someone other than the patient: yes  Review and summarize past medical records: yes    Patient Progress  Patient progress: stable      Disposition  Final diagnoses:   Pancreatitis   Hyponatremia     Time reflects when diagnosis was documented in both MDM as applicable and the Disposition within this note     Time User Action Codes Description Comment    10/21/2022  1:40 PM Chacha BURCH Add [K85 90] Pancreatitis     10/21/2022  1:40 PM Sissy Burleson Add [E87 1] Hyponatremia       ED Disposition     ED Disposition   Admit    Condition   Stable    Date/Time   Fri Oct 21, 2022  1:40 PM    Comment   Case was discussed with Dr Nadine Hurtado and the patient's admission status was agreed to be Admission Status: inpatient status to the service of Dr Nadine Hurtado             Follow-up Information    None         Current Discharge Medication List      CONTINUE these medications which have NOT CHANGED    Details   Dapagliflozin-metFORMIN HCl ER (Xigduo XR) 5-500 MG TB24 Take 1 tablet by mouth daily  Qty: 30 tablet, Refills: 3    Associated Diagnoses: Diabetic polyneuropathy associated with type 2 diabetes mellitus (HCC)      escitalopram (LEXAPRO) 10 mg tablet take 1 tablet by mouth once daily  Qty: 90 tablet, Refills: 1    Associated Diagnoses: Anxiety and depression      gabapentin (NEURONTIN) 800 mg tablet Take 1 tablet (800 mg total) by mouth 3 (three) times a day  Qty: 90 tablet, Refills: 5    Associated Diagnoses: Paresthesia      linaGLIPtin (Tradjenta) 5 MG TABS Take 5 mg by mouth daily Tradjenta 1 tablet daily      Multiple Vitamin (DAILY VALUE MULTIVITAMIN) TABS Take by mouth daily       ondansetron (ZOFRAN-ODT) 8 mg disintegrating tablet Take 1 tablet (8 mg total) by mouth every 8 (eight) hours as needed for nausea or vomiting  Qty: 30 tablet, Refills: 0    Associated Diagnoses: Nausea and vomiting, unspecified vomiting type      ramipril (ALTACE) 2 5 mg capsule take 1 capsule by mouth once daily  Qty: 90 capsule, Refills: 0    Associated Diagnoses: Diabetic polyneuropathy associated with type 2 diabetes mellitus (HCC)      repaglinide (PRANDIN) 2 mg tablet Take 1 tablet (2 mg total) by mouth 3 (three) times a day before meals  Qty: 90 tablet, Refills: 5    Associated Diagnoses: Diabetic polyneuropathy associated with type 2 diabetes mellitus (HCC)      albuterol (PROVENTIL HFA,VENTOLIN HFA) 90 mcg/act inhaler Inhale 2 puffs every 6 (six) hours as needed for wheezing or shortness of breath  Qty: 1 Inhaler, Refills: 1    Comments: Substitution to a formulary equivalent within the same pharmaceutical class is authorized  Associated Diagnoses: Allergic asthma, mild intermittent, uncomplicated      ergocalciferol (VITAMIN D2) 50,000 units 50,000 Units once a week       OneTouch Ultra test strip Use 1 each 2 (two) times a day  Qty: 180 strip, Refills: 3    Associated Diagnoses: Diabetic polyneuropathy associated with type 2 diabetes mellitus (HCC)      rosuvastatin (CRESTOR) 5 mg tablet Take 5 mg by mouth daily             No discharge procedures on file      PDMP Review       Value Time User    PDMP Reviewed  Yes 10/21/2022  5:18 PM Cayetano Vo DO          ED Provider  Electronically Signed by           Haylee Zhao DO  10/21/22 [address] [place] [address],   10/21/22 2002

## 2022-10-21 NOTE — H&P
Martha 46  H&P- Darion Wood 1975, 56 y o  female MRN: [id number]  Unit/Bed#: [address] [place] [address] Encounter: [id number]  Primary Care Provider: Adry Pichardo   Date and time admitted to hospital: 10/21/2022 12:24 PM    Abdominal pain  Assessment & Plan  Patient presented to the ER with complaints of abdominal pain, nausea  Abdominal pain has been occurring for about 5 days  Had diarrhea for 1 5 days  Outpatient lab work on 10/20 showed lipase level of 653 and patient was advised to come to the ER  · CT abdomen/pelvis showed mild inflammatory stranding along the right anterior abdominal wall and left lower long lobe nodule  Findings discussed with patient and  at bedside  · Outpatient right upper quadrant ultrasound was normal  · Lipase level today 1674 however no signs of pancreatitis on imaging  · Keep patient NPO  IV fluids, pain management p r n  · Repeat lab work in a m  Check triglycerides  · Patient states she went to winery and had 2 bottles of wine which was shared between 3 people but otherwise drinks on rare occasions      Hyponatremia  Assessment & Plan  Sodium level of 127, corrected 129 likely hypovolemic hyponatremia  IV fluids and get repeat lab work    Primary hypertension  Assessment & Plan  Continue lisinopril and monitor blood pressures    Diabetes mellitus with polyneuropathy [facility])  Assessment & Plan  Lab Results   Component Value Date    HGBA1C 10 2 (H) 10/20/2022       No results for input(s): POCGLU in the last 72 hours  Hold home medications  Place patient on Accu-Cheks q 6 hours with sliding scale insulin while NPO      Anxiety and depression  Assessment & Plan  Continue Lexapro    VTE Pharmacologic Prophylaxis: VTE Score: 2 Low Risk (Score 0-2) - Encourage Ambulation  Code Status: Level 1 - Full Code   Discussion with family: Updated  ( and son) at bedside      Anticipated Length of Stay: Patient will be admitted on an inpatient basis with an anticipated length of stay of greater than 2 midnights secondary to Abdominal pain with elevated lipase, hyponatremia  Total Time for Visit, including Counseling / Coordination of Care: 45 minutes Greater than 50% of this total time spent on direct patient counseling and coordination of care  Chief Complaint:  Abdominal pain    History of Present Illness:  Joseph Espitia is a 56 y o  female who presents with abdominal pain  Patient reports abdominal pain above the umbilicus slightly to the right side which has been ongoing since 10/17  Patient states pain is intermittent in nature and stabbing in quality  8/10 in intensity at its worst and lasted few minutes before resolving  States that the pain seems worse after eating  Patient also reports nausea but no vomiting  Reports diarrhea for 1 5 days  Last bowel movement was today and was soft  Patient had right upper quadrant ultrasound done after seeing her PCP yesterday and also had outpatient lab work  On outpatient lab work lipase level was noted to be elevated and she was advised to come to the ER due to concerns for pancreatitis  Patient denies any illicit drug use or changes in medications  Drinks on rare occasions but did drink 2 bottles of wine that which showed between 3 people recently    Review of Systems:  Review of Systems   Constitutional: Negative for appetite change, chills and fever  HENT: Negative for congestion and trouble swallowing  Eyes: Negative for photophobia  Respiratory: Negative for chest tightness and shortness of breath  Cardiovascular: Negative for chest pain and leg swelling  Gastrointestinal: Positive for abdominal pain, diarrhea and nausea  Negative for blood in stool and vomiting  Genitourinary: Negative for dysuria, frequency and hematuria  Musculoskeletal: Positive for back pain  Skin: Negative for wound  Neurological: Negative for headaches     Hematological: Does not bruise/bleed easily  Psychiatric/Behavioral: Negative for agitation  Past Medical and Surgical History:   Past Medical History:   Diagnosis Date   • Acute gastritis     [id number] RESOLVED   • Allergic    • Asthma    • Breast pain     [id number] RESOLVED   • COVID-19 virus infection 12/1/2021   • Diabetes ([facility] [address] )     MELLITUS   • Diabetes mellitus ([facility] )    • Viral gastroenteritis     [id number] RESOLVED       Past Surgical History:   Procedure Laterality Date   • HAND SURGERY     • SKIN BIOPSY         Meds/Allergies:  Prior to Admission medications    Medication Sig Start Date End Date Taking?  Authorizing Provider   Dapagliflozin-metFORMIN HCl ER (Xigduo XR) 5-500 MG TB24 Take 1 tablet by mouth daily 8/19/22  Yes Kathy Dotson MD   escitalopram (LEXAPRO) 10 mg tablet take 1 tablet by mouth once daily 7/9/22  Yes ASA Ruiz   gabapentin (NEURONTIN) 800 mg tablet Take 1 tablet (800 mg total) by mouth 3 (three) times a day 6/30/22  Yes Loretta Bryant DO   linaGLIPtin (Tradjenta) 5 MG TABS Take 5 mg by mouth daily Tradjenta 1 tablet daily   Yes Historical Provider, MD   Multiple Vitamin (DAILY VALUE MULTIVITAMIN) TABS Take by mouth daily    Yes Historical Provider, MD   ondansetron (ZOFRAN-ODT) 8 mg disintegrating tablet Take 1 tablet (8 mg total) by mouth every 8 (eight) hours as needed for nausea or vomiting 5/23/22  Yes ASA Aly   ramipril (ALTACE) 2 5 mg capsule take 1 capsule by mouth once daily 3/12/20  Yes ASA Ruiz   repaglinide (PRANDIN) 2 mg tablet Take 1 tablet (2 mg total) by mouth 3 (three) times a day before meals 5/26/22  Yes ASA Ruiz   albuterol (PROVENTIL HFA,VENTOLIN HFA) 90 mcg/act inhaler Inhale 2 puffs every 6 (six) hours as needed for wheezing or shortness of breath 4/23/21   ASA Ruiz   ergocalciferol (VITAMIN D2) 50,000 units 50,000 Units once a week   Patient not taking: No sig reported 4/15/20   Historical Provider, MD   OneTouch Ultra test strip Use 1 each 2 (two) times a day 4/8/22   Odelia Nyhan, MD   rosuvastatin (CRESTOR) 5 mg tablet Take 5 mg by mouth daily  Patient not taking: No sig reported    Historical Provider, MD     I have reviewed home medications using recent Epic encounter  Allergies: No Known Allergies    Social History:  Marital Status: /Civil Union   Occupation: N/A  Patient Pre-hospital Living Situation: With spouse  Patient Pre-hospital Level of Mobility: walks  Patient Pre-hospital Diet Restrictions: none  Substance Use History:   Social History     Substance and Sexual Activity   Alcohol Use Not Currently    Comment: 2-3 drinks      Social History     Tobacco Use   Smoking Status Current Every Day Smoker   • Packs/day: 0 50   Smokeless Tobacco Never Used     Social History     Substance and Sexual Activity   Drug Use No       Family History:  Family History   Problem Relation Age of Onset   • Hypertension Mother    • Breast cancer Family    • Colon cancer Family    • Diabetes Family         MELLITUS   • Lung cancer Family    • Mental illness Neg Hx    • Substance Abuse Neg Hx        Physical Exam:     Vitals:   Blood Pressure: 124/80 (10/21/22 1518)  Pulse: 89 (10/21/22 1518)  Temperature: 97 9 °F (36 6 °C) (10/21/22 1518)  Temp Source: Tympanic (10/21/22 1131)  Respirations: 22 (10/21/22 1131)  SpO2: 98 % (10/21/22 1518)    Physical Exam  Constitutional:       General: She is not in acute distress  Appearance: She is well-developed  HENT:      Head: Normocephalic and atraumatic  Eyes:      General: No scleral icterus  Cardiovascular:      Rate and Rhythm: Normal rate and regular rhythm  Pulmonary:      Effort: Pulmonary effort is normal  No respiratory distress  Breath sounds: Normal breath sounds  No wheezing or rales  Abdominal:      General: Bowel sounds are normal  There is no distension  Palpations: Abdomen is soft  Tenderness:  There is abdominal tenderness (mild tenderness noted above the umbilicus and slightly to the right side, very mild tenderness noted in the left upper quadrant)  There is no guarding  Musculoskeletal:      Right lower leg: No edema  Left lower leg: No edema  Neurological:      Mental Status: She is alert and oriented to person, place, and time  Additional Data:     Lab Results:  Results from last 7 days   Lab Units 10/21/22  1235   WBC Thousand/uL 11 99*   HEMOGLOBIN g/dL 16 1*   HEMATOCRIT % 49 7*   PLATELETS Thousands/uL 239   NEUTROS PCT % 80*   LYMPHS PCT % 11*   MONOS PCT % 6   EOS PCT % 1     Results from last 7 days   Lab Units 10/21/22  1235   SODIUM mmol/L 127*   POTASSIUM mmol/L 4 2   CHLORIDE mmol/L 95*   CO2 mmol/L 24   BUN mg/dL 14   CREATININE mg/dL 0 94   ANION GAP mmol/L 8   CALCIUM mg/dL 9 3   ALBUMIN g/dL 4 3   TOTAL BILIRUBIN mg/dL 0 44   ALK PHOS U/L 82   ALT U/L 16   AST U/L 12   GLUCOSE RANDOM mg/dL 199*             Results from last 7 days   Lab Units 10/20/22  1431   HEMOGLOBIN A1C % 10 2*           Imaging: Reviewed radiology reports from this admission including: chest CT scan and ultrasound(s)  CT abdomen pelvis with contrast   Final Result by Haydee Devlin MD (10/21 1311)      Mild inflammatory stranding right anterior abdominal wall  3 mm pulmonary nodule left lower lobe  If patient is at high risk for malignancy, recommend short-term follow-up in 12 months  Workstation performed: [id number]             EKG and Other Studies Reviewed on Admission:   · No ekg noted    ** Please Note: This note has been constructed using a voice recognition system   **

## 2022-10-21 NOTE — PLAN OF CARE
Problem: PAIN - ADULT  Goal: Verbalizes/displays adequate comfort level or baseline comfort level  Description: Interventions:  - Encourage patient to monitor pain and request assistance  - Assess pain using appropriate pain scale  - Administer analgesics based on type and severity of pain and evaluate response  - Implement non-pharmacological measures as appropriate and evaluate response  - Consider cultural and social influences on pain and pain management  - Notify physician/advanced practitioner if interventions unsuccessful or patient reports new pain  Outcome: Progressing     Problem: SAFETY ADULT  Goal: Patient will remain free of falls  Description: INTERVENTIONS:  - Educate patient/family on patient safety including physical limitations  - Instruct patient to call for assistance with activity   - Consult OT/PT to assist with strengthening/mobility   - Keep Call bell within reach  - Keep bed low and locked with side rails adjusted as appropriate  - Keep care items and personal belongings within reach  - Initiate and maintain comfort rounds  - Make Fall Risk Sign visible to staff  - Offer Toileting every 2 Hours, in advance of need  - Initiate/Maintain bed alarm  - Obtain necessary fall risk management equipment: bed alarm, yellow socks   - Apply yellow socks and bracelet for high fall risk patients  - Consider moving patient to room near nurses station  Outcome: Progressing  Goal: Maintain or return to baseline ADL function  Description: INTERVENTIONS:  -  Assess patient's ability to carry out ADLs; assess patient's baseline for ADL function and identify physical deficits which impact ability to perform ADLs (bathing, care of mouth/teeth, toileting, grooming, dressing, etc )  - Assess/evaluate cause of self-care deficits   - Assess range of motion  - Assess patient's mobility; develop plan if impaired  - Assess patient's need for assistive devices and provide as appropriate  - Encourage maximum independence but intervene and supervise when necessary  - Involve family in performance of ADLs  - Assess for home care needs following discharge   - Consider OT consult to assist with ADL evaluation and planning for discharge  - Provide patient education as appropriate  Outcome: Progressing  Goal: Maintains/Returns to pre admission functional level  Description: INTERVENTIONS:  - Perform BMAT or MOVE assessment daily    - Set and communicate daily mobility goal to care team and patient/family/caregiver  - Collaborate with rehabilitation services on mobility goals if consulted  - Perform Range of Motion 3 times a day  - Reposition patient every 2 hours  - Dangle patient 3 times a day  - Stand patient 3 times a day  - Ambulate patient 3 times a day  - Out of bed to chair 3 times a day   - Out of bed for meals 3 times a day  - Out of bed for toileting  - Record patient progress and toleration of activity level   Outcome: Progressing     Problem: DISCHARGE PLANNING  Goal: Discharge to home or other facility with appropriate resources  Description: INTERVENTIONS:  - Identify barriers to discharge w/patient and caregiver  - Arrange for needed discharge resources and transportation as appropriate  - Identify discharge learning needs (meds, wound care, etc )  - Arrange for interpretive services to assist at discharge as needed  - Refer to Case Management Department for coordinating discharge planning if the patient needs post-hospital services based on physician/advanced practitioner order or complex needs related to functional status, cognitive ability, or social support system  Outcome: Progressing     Problem: Knowledge Deficit  Goal: Patient/family/caregiver demonstrates understanding of disease process, treatment plan, medications, and discharge instructions  Description: Complete learning assessment and assess knowledge base    Interventions:  - Provide teaching at level of understanding  - Provide teaching via preferred learning methods  Outcome: Progressing     Problem: GASTROINTESTINAL - ADULT  Goal: Minimal or absence of nausea and/or vomiting  Description: INTERVENTIONS:  - Administer IV fluids if ordered to ensure adequate hydration  - Maintain NPO status until nausea and vomiting are resolved  - Nasogastric tube if ordered  - Administer ordered antiemetic medications as needed  - Provide nonpharmacologic comfort measures as appropriate  - Advance diet as tolerated, if ordered  - Consider nutrition services referral to assist patient with adequate nutrition and appropriate food choices  Outcome: Progressing  Goal: Maintains adequate nutritional intake  Description: INTERVENTIONS:  - Monitor percentage of each meal consumed  - Identify factors contributing to decreased intake, treat as appropriate  - Assist with meals as needed  - Monitor I&O, weight, and lab values if indicated  - Obtain nutrition services referral as needed  Outcome: Progressing

## 2022-10-21 NOTE — TELEPHONE ENCOUNTER
Spoke w/ pt regarding labs, consistent with pancreatitis  She is still in a considerable amount of pain  Discussed ER for further management and repeat labs    She is agreeable and will go this AM  ASA Duarte

## 2022-10-22 VITALS
DIASTOLIC BLOOD PRESSURE: 76 MMHG | RESPIRATION RATE: 16 BRPM | HEART RATE: 77 BPM | TEMPERATURE: 98 F | OXYGEN SATURATION: 99 % | SYSTOLIC BLOOD PRESSURE: 123 MMHG

## 2022-10-22 LAB
ALBUMIN SERPL BCP-MCNC: 3.8 G/DL (ref 3.5–5)
ALP SERPL-CCNC: 77 U/L (ref 46–116)
ALT SERPL W P-5'-P-CCNC: 14 U/L (ref 12–78)
ANION GAP SERPL CALCULATED.3IONS-SCNC: 15 MMOL/L (ref 4–13)
AST SERPL W P-5'-P-CCNC: 13 U/L (ref 5–45)
BILIRUB SERPL-MCNC: 0.7 MG/DL (ref 0.2–1)
BUN SERPL-MCNC: 9 MG/DL (ref 5–25)
CALCIUM SERPL-MCNC: 8.8 MG/DL (ref 8.3–10.1)
CHLORIDE SERPL-SCNC: 101 MMOL/L (ref 96–108)
CO2 SERPL-SCNC: 21 MMOL/L (ref 21–32)
CREAT SERPL-MCNC: 0.89 MG/DL (ref 0.6–1.3)
ERYTHROCYTE [DISTWIDTH] IN BLOOD BY AUTOMATED COUNT: 15.2 % (ref 11.6–15.1)
GFR SERPL CREATININE-BSD FRML MDRD: 77 ML/MIN/1.73SQ M
GLUCOSE SERPL-MCNC: 188 MG/DL (ref 65–140)
GLUCOSE SERPL-MCNC: 191 MG/DL (ref 65–140)
GLUCOSE SERPL-MCNC: 191 MG/DL (ref 65–140)
GLUCOSE SERPL-MCNC: 84 MG/DL (ref 65–140)
HCG SERPL QL: NEGATIVE
HCT VFR BLD AUTO: 48.1 % (ref 34.8–46.1)
HGB BLD-MCNC: 15.4 G/DL (ref 11.5–15.4)
LIPASE SERPL-CCNC: 591 U/L (ref 73–393)
LIPASE SERPL-CCNC: 899 U/L (ref 73–393)
MAGNESIUM SERPL-MCNC: 2.1 MG/DL (ref 1.6–2.6)
MCH RBC QN AUTO: 30.7 PG (ref 26.8–34.3)
MCHC RBC AUTO-ENTMCNC: 32 G/DL (ref 31.4–37.4)
MCV RBC AUTO: 96 FL (ref 82–98)
PLATELET # BLD AUTO: 226 THOUSANDS/UL (ref 149–390)
PMV BLD AUTO: 11.5 FL (ref 8.9–12.7)
POTASSIUM SERPL-SCNC: 4.2 MMOL/L (ref 3.5–5.3)
PROT SERPL-MCNC: 8.1 G/DL (ref 6.4–8.4)
RBC # BLD AUTO: 5.02 MILLION/UL (ref 3.81–5.12)
SODIUM SERPL-SCNC: 137 MMOL/L (ref 135–147)
TRIGL SERPL-MCNC: 301 MG/DL
WBC # BLD AUTO: 6.78 THOUSAND/UL (ref 4.31–10.16)

## 2022-10-22 PROCEDURE — 80053 COMPREHEN METABOLIC PANEL: CPT | Performed by: FAMILY MEDICINE

## 2022-10-22 PROCEDURE — 85027 COMPLETE CBC AUTOMATED: CPT | Performed by: FAMILY MEDICINE

## 2022-10-22 PROCEDURE — 84703 CHORIONIC GONADOTROPIN ASSAY: CPT | Performed by: FAMILY MEDICINE

## 2022-10-22 PROCEDURE — 84478 ASSAY OF TRIGLYCERIDES: CPT | Performed by: FAMILY MEDICINE

## 2022-10-22 PROCEDURE — 99239 HOSP IP/OBS DSCHRG MGMT >30: CPT | Performed by: FAMILY MEDICINE

## 2022-10-22 PROCEDURE — 83735 ASSAY OF MAGNESIUM: CPT | Performed by: FAMILY MEDICINE

## 2022-10-22 PROCEDURE — 82948 REAGENT STRIP/BLOOD GLUCOSE: CPT

## 2022-10-22 PROCEDURE — 83690 ASSAY OF LIPASE: CPT | Performed by: FAMILY MEDICINE

## 2022-10-22 RX ORDER — SODIUM CHLORIDE, SODIUM LACTATE, POTASSIUM CHLORIDE, CALCIUM CHLORIDE 600; 310; 30; 20 MG/100ML; MG/100ML; MG/100ML; MG/100ML
200 INJECTION, SOLUTION INTRAVENOUS CONTINUOUS
Status: DISCONTINUED | OUTPATIENT
Start: 2022-10-22 | End: 2022-10-22 | Stop reason: HOSPADM

## 2022-10-22 RX ORDER — NICOTINE 21 MG/24HR
1 PATCH, TRANSDERMAL 24 HOURS TRANSDERMAL DAILY
Qty: 28 PATCH | Refills: 0 | Status: SHIPPED | OUTPATIENT
Start: 2022-10-23

## 2022-10-22 RX ADMIN — B-COMPLEX W/ C & FOLIC ACID TAB 1 TABLET: TAB at 08:38

## 2022-10-22 RX ADMIN — ESCITALOPRAM OXALATE 10 MG: 10 TABLET ORAL at 08:38

## 2022-10-22 RX ADMIN — NICOTINE 1 PATCH: 14 PATCH, EXTENDED RELEASE TRANSDERMAL at 08:38

## 2022-10-22 RX ADMIN — INSULIN LISPRO 1 UNITS: 100 INJECTION, SOLUTION INTRAVENOUS; SUBCUTANEOUS at 06:10

## 2022-10-22 RX ADMIN — GABAPENTIN 800 MG: 400 CAPSULE ORAL at 08:38

## 2022-10-22 RX ADMIN — SODIUM CHLORIDE, SODIUM LACTATE, POTASSIUM CHLORIDE, AND CALCIUM CHLORIDE 150 ML/HR: .6; .31; .03; .02 INJECTION, SOLUTION INTRAVENOUS at 06:10

## 2022-10-22 RX ADMIN — INSULIN LISPRO 1 UNITS: 100 INJECTION, SOLUTION INTRAVENOUS; SUBCUTANEOUS at 13:51

## 2022-10-22 NOTE — DISCHARGE INSTRUCTIONS
You were noted to have Mild inflammatory stranding of right anterior abdominal wall  You were also noted to have 3 mm left lung nodule  Since you are a smoker, recommend short-term follow-up in 12 months  This can be set up by your primary care doctor    Stop smoking  Refrain from any alcohol use for now till this episode of pancreatitis has resolved    Continue with the diabetic clear liquid diet for the next 24-48 hours  Keep yourself well hydrated    If you do to not have any recurrence of worsening abdominal pain, you can advance to yourself to low-fat diet     You can take an over-the-counter fish oil tab to help with your triglyceride level

## 2022-10-22 NOTE — ASSESSMENT & PLAN NOTE
Lab Results   Component Value Date    HGBA1C 10 2 (H) 10/20/2022       Recent Labs     10/21/22  2123 10/21/22  2357 10/22/22  0601 10/22/22  1245   POCGLU 170* 84 188* 191*       Held home medications while here but okay to restart on discharge  Spoke with patient about her uncontrolled diabetes and that she needs to have better control of her sugars    Patient not ready to start insulin yet    On Accu-Cheks with sliding scale insulin while here

## 2022-10-22 NOTE — ASSESSMENT & PLAN NOTE
Patient presented to the ER with complaints of abdominal pain, nausea  Abdominal pain has been occurring for about 5 days  Had diarrhea for 1 5 days  Outpatient lab work on 10/20 showed lipase level of 653 and patient was advised to come to the ER  · CT abdomen/pelvis showed mild inflammatory stranding along the right anterior abdominal wall and left lower long lobe nodule  Findings discussed with patient and  at bedside  · Outpatient right upper quadrant ultrasound was normal  · Lipase level today 1674 however no signs of pancreatitis on imaging  · Keep patient NPO  IV fluids, pain management p r n  · Repeat lab work in a m   Check triglycerides  · Patient states she went to [employer] and had 2 bottles of wine which was shared between 3 people but otherwise drinks on rare occasions

## 2022-10-22 NOTE — ASSESSMENT & PLAN NOTE
Lab Results   Component Value Date    HGBA1C 10 2 (H) 10/20/2022       No results for input(s): POCGLU in the last 72 hours  Hold home medications    Place patient on Accu-Cheks q 6 hours with sliding scale insulin while NPO

## 2022-10-22 NOTE — NURSING NOTE
Patient discharge instructions given to patient  No questions at this time  Patient left stable, all belongings at side

## 2022-10-22 NOTE — PLAN OF CARE
Problem: PAIN - ADULT  Goal: Verbalizes/displays adequate comfort level or baseline comfort level  Description: Interventions:  - Encourage patient to monitor pain and request assistance  - Assess pain using appropriate pain scale  - Administer analgesics based on type and severity of pain and evaluate response  - Implement non-pharmacological measures as appropriate and evaluate response  - Consider cultural and social influences on pain and pain management  - Notify physician/advanced practitioner if interventions unsuccessful or patient reports new pain  Outcome: Progressing     Problem: SAFETY ADULT  Goal: Patient will remain free of falls  Description: INTERVENTIONS:  - Educate patient/family on patient safety including physical limitations  - Instruct patient to call for assistance with activity   - Consult OT/PT to assist with strengthening/mobility   - Keep Call bell within reach  - Keep bed low and locked with side rails adjusted as appropriate  - Keep care items and personal belongings within reach  - Initiate and maintain comfort rounds  - Make Fall Risk Sign visible to staff  - Offer Toileting every 2 Hours, in advance of need  - Initiate/Maintain bed alarm  - Obtain necessary fall risk management equipment: yes  - Apply yellow socks and bracelet for high fall risk patients  - Consider moving patient to room near nurses station  Outcome: Progressing  Goal: Maintain or return to baseline ADL function  Description: INTERVENTIONS:  -  Assess patient's ability to carry out ADLs; assess patient's baseline for ADL function and identify physical deficits which impact ability to perform ADLs (bathing, care of mouth/teeth, toileting, grooming, dressing, etc )  - Assess/evaluate cause of self-care deficits   - Assess range of motion  - Assess patient's mobility; develop plan if impaired  - Assess patient's need for assistive devices and provide as appropriate  - Encourage maximum independence but intervene and supervise when necessary  - Involve family in performance of ADLs  - Assess for home care needs following discharge   - Consider OT consult to assist with ADL evaluation and planning for discharge  - Provide patient education as appropriate  Outcome: Progressing  Goal: Maintains/Returns to pre admission functional level  Description: INTERVENTIONS:  - Perform BMAT or MOVE assessment daily    - Set and communicate daily mobility goal to care team and patient/family/caregiver  - Collaborate with rehabilitation services on mobility goals if consulted  - Perform Range of Motion 3 times a day  - Reposition patient every 2 hours  - Dangle patient 3 times a day  - Stand patient 3 times a day  - Ambulate patient 3 times a day  - Out of bed to chair 3 times a day   - Out of bed for meals 3 times a day  - Out of bed for toileting  - Record patient progress and toleration of activity level   Outcome: Progressing     Problem: DISCHARGE PLANNING  Goal: Discharge to home or other facility with appropriate resources  Description: INTERVENTIONS:  - Identify barriers to discharge w/patient and caregiver  - Arrange for needed discharge resources and transportation as appropriate  - Identify discharge learning needs (meds, wound care, etc )  - Arrange for interpretive services to assist at discharge as needed  - Refer to Case Management Department for coordinating discharge planning if the patient needs post-hospital services based on physician/advanced practitioner order or complex needs related to functional status, cognitive ability, or social support system  Outcome: Progressing     Problem: Knowledge Deficit  Goal: Patient/family/caregiver demonstrates understanding of disease process, treatment plan, medications, and discharge instructions  Description: Complete learning assessment and assess knowledge base    Interventions:  - Provide teaching at level of understanding  - Provide teaching via preferred learning methods  Outcome: Progressing     Problem: GASTROINTESTINAL - ADULT  Goal: Minimal or absence of nausea and/or vomiting  Description: INTERVENTIONS:  - Administer IV fluids if ordered to ensure adequate hydration  - Maintain NPO status until nausea and vomiting are resolved  - Nasogastric tube if ordered  - Administer ordered antiemetic medications as needed  - Provide nonpharmacologic comfort measures as appropriate  - Advance diet as tolerated, if ordered  - Consider nutrition services referral to assist patient with adequate nutrition and appropriate food choices  Outcome: Progressing  Goal: Maintains adequate nutritional intake  Description: INTERVENTIONS:  - Monitor percentage of each meal consumed  - Identify factors contributing to decreased intake, treat as appropriate  - Assist with meals as needed  - Monitor I&O, weight, and lab values if indicated  - Obtain nutrition services referral as needed  Outcome: Progressing

## 2022-10-22 NOTE — ASSESSMENT & PLAN NOTE
Sodium level of 127, corrected 129 likely hypovolemic hyponatremia  IV fluids and get repeat lab work

## 2022-10-22 NOTE — DISCHARGE SUMMARY
Suzeabbie 46  Discharge- Rajendra Ponce 1975, 56 y o  female MRN: [id number]  Unit/Bed#: [address] [place] [address] Encounter: [id number]  Primary Care Provider: Adry Boogie   Date and time admitted to hospital: 10/21/2022 12:24 PM    * Abdominal pain  Assessment & Plan  Patient presented to the ER with complaints of abdominal pain, nausea  Abdominal pain has been occurring for about 5 days  Had diarrhea for 1 5 days  Outpatient lab work on 10/20 showed lipase level of 653 and patient was advised to come to the ER  · CT abdomen/pelvis showed mild inflammatory stranding along the right anterior abdominal wall and left lower long lobe nodule  Findings discussed with patient and  at bedside  · Outpatient right upper quadrant ultrasound was normal  · Lipase level today 1674 however no signs of pancreatitis on imaging  · Lipase level has trended down to 591 with resolution of abdominal pain  · Initially kept patient NPO  IV fluids, pain management p r n  transitioned to clears which patient is tolerating  · triglycerides 301  · Patient states she went to winery and had 2 bottles of wine which was shared between 3 people but otherwise drinks on rare occasions  · Follow-up with GI after discharge      Primary hypertension  Assessment & Plan  Continue lisinopril    Diabetes mellitus with polyneuropathy [facility])  Assessment & Plan  Lab Results   Component Value Date    HGBA1C 10 2 (H) 10/20/2022       Recent Labs     10/21/22  2123 10/21/22  2357 10/22/22  0601 10/22/22  1245   POCGLU 170* 84 188* 191*       Held home medications while here but okay to restart on discharge  Spoke with patient about her uncontrolled diabetes and that she needs to have better control of her sugars    Patient not ready to start insulin yet    On Accu-Cheks with sliding scale insulin while here    Anxiety and depression  Assessment & Plan  Continue Lexapro    Hyponatremia-resolved as of 10/23/2022  Assessment & Plan  Sodium level of 127, corrected 129 likely hypovolemic hyponatremia  Resolved with IV fluids    Medical Problems             Resolved Problems  Date Reviewed: 10/22/2022   None               Discharging Physician / Practitioner: Park Oreilly  PCP: Adry Frye  Admission Date:   Admission Orders (From admission, onward)     Ordered        10/21/22 1355  1 [facility] [place]  Once                      Discharge Date: 10/22/22    Consultations During Hospital Stay:  · none    Procedures Performed:   · none    Significant Findings / Test Results:   · none    Incidental Findings:   · Left lung nodule    Test Results Pending at Discharge (will require follow up):   · none     Outpatient Tests Requested:  · none    Complications:  None    Reason for Admission: Abdominal pain    Hospital Course:   Gabe Fiore is a 56 y o  female patient who originally presented to the hospital on 10/21/2022 due to Abdominal pain which was above the umbilicus post slightly to the right side, ongoing since 10/17  Patient stated pain is stabbing in nature and seemed worse after eating  Patient also reported nausea but no vomiting  Patient right upper quadrant ultrasound done as an outpatient along with lab work which showed elevated lipase and patient was advised to come to the ER for further management due to concern for pancreatitis  Lab work done in the ER showed lipase level was 1600s and patient was admitted  Patient stated that she really wants to go home today itself as she feels significantly better and abdominal pain has resolved and has to attend to some personal matters  Patient does understand that her lipase level was not completely normal and the pain may return  Patient showed understanding but pressing to go home    Advised patient to stay on clear liquid diet for the next 24-48 hours and then transition to low-fat/diabetic diet    The patient, initially admitted to the hospital as inpatient, was discharged earlier than expected given the following: Improvement in lipase level and resolution of abdominal pain  Please see above list of diagnoses and related plan for additional information  Condition at Discharge: stable    Discharge Day Visit / Exam:   Subjective:  Patient denies any further abdominal pain  Tolerating clears and really wants to get home as soon as possible    Vitals: Blood Pressure: 123/76 (10/22/22 1514)  Pulse: 77 (10/22/22 1514)  Temperature: 98 °F (36 7 °C) (10/22/22 1514)  Temp Source: Oral (10/21/22 1516)  Respirations: 16 (10/22/22 1514)  SpO2: 99 % (10/22/22 1514)  Exam:   Physical Exam  Constitutional:       General: She is not in acute distress  Appearance: She is well-developed  HENT:      Head: Normocephalic and atraumatic  Eyes:      General: No scleral icterus  Cardiovascular:      Rate and Rhythm: Normal rate and regular rhythm  Pulmonary:      Effort: Pulmonary effort is normal  No respiratory distress  Breath sounds: Normal breath sounds  No wheezing or rales  Abdominal:      General: Bowel sounds are normal  There is no distension  Palpations: Abdomen is soft  Tenderness: There is no abdominal tenderness  Musculoskeletal:      Right lower leg: No edema  Left lower leg: No edema  Neurological:      Mental Status: She is alert and oriented to person, place, and time  Discharge instructions/Information to patient and family:   See after visit summary for information provided to patient and family  Provisions for Follow-Up Care:  See after visit summary for information related to follow-up care and any pertinent home health orders  Disposition:   Home    Planned Readmission: NO     Discharge Statement:  I spent > 30 minutes discharging the patient  This time was spent on the day of discharge  I had direct contact with the patient on the day of discharge   Greater than 50% of the total time was spent examining patient, answering all patient questions, arranging and discussing plan of care with patient as well as directly providing post-discharge instructions  Additional time then spent on discharge activities  Discharge Medications:  See after visit summary for reconciled discharge medications provided to patient and/or family        **Please Note: This note may have been constructed using a voice recognition system**

## 2022-10-23 PROBLEM — E87.1 HYPONATREMIA: Status: RESOLVED | Noted: 2022-10-21 | Resolved: 2022-10-23

## 2022-10-23 NOTE — ASSESSMENT & PLAN NOTE
Patient presented to the ER with complaints of abdominal pain, nausea  Abdominal pain has been occurring for about 5 days  Had diarrhea for 1 5 days  Outpatient lab work on 10/20 showed lipase level of 653 and patient was advised to come to the ER  · CT abdomen/pelvis showed mild inflammatory stranding along the right anterior abdominal wall and left lower long lobe nodule  Findings discussed with patient and  at bedside  · Outpatient right upper quadrant ultrasound was normal  · Lipase level today 1674 however no signs of pancreatitis on imaging  · Lipase level has trended down to 591 with resolution of abdominal pain  · Initially kept patient NPO    IV fluids, pain management p r n  transitioned to clears which patient is tolerating  · triglycerides 301  · Patient states she went to winery and had 2 bottles of wine which was shared between 3 people but otherwise drinks on rare occasions  · Follow-up with GI after discharge

## 2022-10-23 NOTE — UTILIZATION REVIEW
Initial Clinical Review    Admission: Date/Time/Statement:   Admission Orders (From admission, onward)     Ordered        10/21/22 1355  [address] [facility]  Once                   Orders Placed This Encounter   Procedures   • INPATIENT ADMISSION     Standing Status:   Standing     Number of Occurrences:   1     Order Specific Question:   Level of Care     Answer:   Med Surg [16]     Order Specific Question:   Estimated length of stay     Answer:   More than 2 Midnights     Order Specific Question:   Certification     Answer:   I certify that inpatient services are medically necessary for this patient for a duration of greater than two midnights  See H&P and MD Progress Notes for additional information about the patient's course of treatment  ED Arrival Information     Expected   -    Arrival   10/21/2022 11:16    Acuity   Urgent            Means of arrival   Walk-In    Escorted by   Self    Service   Hospitalist    Admission type   Emergency            Arrival complaint   abnormal lab        Chief Complaint   Patient presents with   • Abnormal Lab     Pain in upper R to back with nausea and diarrhea for a few days  Had labs done yesterday, told by Dr to go to ed poss pancreatitis     Initial Presentation:   57 y/o female with PMHx Asthma, COVID-19 Infx - presents urgently to [facility] ED per PCP on 10/21/22 2nd 4 day history of intermittent "stabbing" 8/10 upper abdominal pain worse on the RUQ with radiation to her back, nausea and diarrhea x 1 1/2 days - pain worse after eating  RUQ US + outpatient labs done on 10/20/22 showed elevated Lipase with concern for Panbcreatitis  In ED -  Temp 98 8    RR 22   Exam: Epigastric and right upper quadrant tenderness  CT shows mild inflammatory stranding right anterior abdominal wall  Labs:  WBC 11,990   - 199  Na 127  H-A1C 10 2  Lipase 1,674   ED Tx: IVF NSS x 1 L, IV Protonix, IV Zofran    Admitted Inpatient 10/21/22 at [phone] 2nd abdominal pain with elevated Lipase - possible Acute Pancreatitis; Hyponatremia - NPO, IVF, IV Protonix, IV MS + IV Zofran prn    10/22/22:  Abdominal Pain with elevated Lipase - possible Acute Pancreatitis  · Lipase level has trended down to 591 with resolution of abdominal pain  · Initially kept patient NPO  IV fluids, pain management p r n  transitioned to clears which patient is tolerating  · triglycerides 301  · Follow-up with GI after discharge  ·   · Hyponatremia - Sodium level of 127, corrected 129 likely hypovolemic hyponatremia  Resolved with IV fluids       ED Triage Vitals [10/21/22 1131]   Temperature Pulse Respirations Blood Pressure SpO2   98 8 °F (37 1 °C) (!) 116 22 130/84 100 %      Temp Source Heart Rate Source Patient Position - Orthostatic VS BP Location FiO2 (%)   Tympanic Monitor Sitting Right arm --      Pain Score       5          Wt Readings from Last 1 Encounters:   10/20/22 64 4 kg (142 lb)     Additional Vital Signs:   Date/Time Temp Pulse Resp BP MAP (mmHg) SpO2 O2 Device Patient Position - Orthostatic VS   10/22/22 15:14:23 98 °F (36 7 °C) 77 16 123/76 92 99 % -- --   10/22/22 08:06:42 -- 73 -- 111/66 81 96 % -- --   10/22/22 0745 -- -- -- -- -- -- None (Room air) --   10/22/22 03:19:14 98 °F (36 7 °C) 81 19 102/63 76 99 % -- --   10/21/22 22:57:52 98 2 °F (36 8 °C) 76 16 97/57 70 97 % -- --   10/21/22 19:08:22 98 1 °F (36 7 °C) 82 18 113/65 81 98 % -- --   10/21/22 15:18:56 97 9 °F (36 6 °C) 89 -- 124/80 95 98 % -- --   10/21/22 15:16:57 97 9 °F (36 6 °C) -- -- 124/80 95 -- -- --   10/21/22 1131 98 8 °F (37 1 °C) 116 Abnormal  22 130/84 -- 100 % None (Room air) Sitting      10/21 0701   10/22 0700   I V  1392 5   Total Intake 1392 5   Net +1392 5       Unmeasured Urine Occurrence 3 x     Pertinent Labs/Diagnostic Test Results:   CT abdomen pelvis with contrast   Mild inflammatory stranding right anterior abdominal wall  3 mm pulmonary nodule left lower lobe    If patient is at high risk for malignancy, recommend short-term follow-up in 12 months  Results from last 7 days   Lab Units 10/22/22  0611 10/21/22  1235 10/20/22  1431   WBC Thousand/uL 6 78 11 99*  --    WHITE BLOOD CELL COUNT  x10E3/uL  --   --  14 4*   HEMOGLOBIN g/dL 15 4 16 1*  --    HEMOGLOBIN  g/dL  --   --  15 0   HEMATOCRIT % 48 1* 49 7*  --    HEMATOCRIT  %  --   --  44 8   PLATELETS Thousands/uL 226 239  --    PLATELETS  A39R2/ZT  --   --  235   NEUTROS ABS Thousands/µL  --  9 56*  --    NEUTROS ABS   x10E3/uL  --   --  11 6*       Results from last 7 days   Lab Units 10/22/22  0611 10/21/22  1235 10/20/22  1431   SODIUM mmol/L 137 127* 133*   POTASSIUM mmol/L 4 2 4 2 4 7   CHLORIDE mmol/L 101 95* 98   CO2 mmol/L 21 24 19*   ANION GAP mmol/L 15* 8  --    BUN mg/dL 9 14 9   CREATININE mg/dL 0 89 0 94 0 70   EGFR ml/min/1 73sq m 77 72 108   CALCIUM mg/dL 8 8 9 3  --    MAGNESIUM mg/dL 2 1  --   --      Results from last 7 days   Lab Units 10/22/22  0611 10/21/22  1235 10/20/22  1431   AST U/L 13 12 24   ALT U/L 14 16 12   ALK PHOS U/L 77 82  --    TOTAL PROTEIN g/dL 8 1 9 1* 7 6   ALBUMIN g/dL 3 8 4 3 4 9*   TOTAL BILIRUBIN mg/dL 0 70 0 44 0 5     Results from last 7 days   Lab Units 10/22/22  1245 10/22/22  0601 10/21/22  2357 10/21/22  2123   POC GLUCOSE mg/dl 191* 188* 84 170*     Results from last 7 days   Lab Units 10/22/22  0611 10/21/22  1235 10/20/22  1431   GLUCOSE RANDOM mg/dL 191* 199* 171*     Results from last 7 days   Lab Units 10/20/22  1431   HEMOGLOBIN A1C % 10 2*   EAG mg/dL 246     Results from last 7 days   Lab Units 10/22/22  1447 10/22/22  0611 10/21/22  1235   LIPASE u/L 591* 899* 1,674*     Results from last 7 days   Lab Units 10/21/22  1235   CLARITY UA  Clear   COLOR UA  Yellow   SPEC GRAV UA  1 025   PH UA  5 5   GLUCOSE UA mg/dl >=1000 (1%)*   KETONES UA mg/dl 40 (2+)*   BLOOD UA  Negative   PROTEIN UA mg/dl Negative   NITRITE UA  Negative   BILIRUBIN UA  Negative   UROBILINOGEN UA E U /dl 0 2   LEUKOCYTES UA Elevated glucose may cause decreased leukocyte values   See urine microscopic for [facility] result/*   WBC UA /hpf 1-2   RBC UA /hpf 0-1   BACTERIA UA /hpf Moderate*   EPITHELIAL CELLS WET PREP /hpf Occasional     ED Treatment:   Medication Administration from 10/21/2022 1116 to 10/21/2022 1502       Date/Time Order Dose Route Action     10/21/2022 1250 sodium chloride 0 9 % bolus 1,000 mL 1,000 mL Intravenous New Bag     10/21/2022 1249 ondansetron (ZOFRAN) injection 4 mg 4 mg Intravenous Given     10/21/2022 1249 pantoprazole (PROTONIX) injection 40 mg 40 mg Intravenous Given     10/21/2022 1249 iohexol (OMNIPAQUE) 300 mg/mL injection 100 mL 100 mL Intravenous Given     Past Medical History:   Diagnosis Date   • Acute gastritis     [id number] RESOLVED   • Allergic    • Asthma    • Breast pain     [id number] RESOLVED   • COVID-19 virus infection 12/1/2021   • Diabetes ([facility] [address] )     MELLITUS   • Diabetes mellitus ([facility] [address] )    • Viral gastroenteritis     [id number] RESOLVED     Present on Admission:  • Anxiety and depression  • Diabetes mellitus with polyneuropathy ([facility] [address] )  • Abdominal pain  • Primary hypertension    Admitting Diagnosis: Hyponatremia [E87 1]  Pancreatitis [K85 90]  Vomiting [R11 10]    Age/Sex: 56 y o  female    Admission Orders:  VS q4hrs  Continuous Pulse Oximetry  SCD  Up + OOB as tolerated  NPO - 10/22/22 START-  Diet Karan/CHO Controlled; Diabetic CL Liquids   I+O q shift    Scheduled Medications:  Medications 10/21 10/22   escitalopram (LEXAPRO) tablet 10 mg  Dose: 10 mg  Freq: Daily Route: PO  Start: 10/22/22 0900 End: 10/22/22 1830     0838     1830-D/C'd      gabapentin (NEURONTIN) capsule 800 mg  Dose: 800 mg  Freq: 3 times daily Route: PO  Start: 10/21/22 2100 End: 10/22/22 1830    2143      0838     1600     1830-D/C'd       insulin lispro (HumaLOG) 100 units/mL subcutaneous injection 1-5 Units  Dose: 1-5 Units  Freq: Every 6 hours scheduled Route: SC  Start: 10/21/22 2045 End: 10/22/22 1830    2143 0000)     0610     1351     1830-D/C'd       insulin lispro (HumaLOG) 100 units/mL subcutaneous injection 1-5 Units  Dose: 1-5 Units  Freq: 3 times daily before meals Route: SC  Start: 10/22/22 0700 End: 10/21/22 2032    2032-D/C'd       lisinopril (ZESTRIL) tablet 10 mg  Dose: 10 mg  Freq: Daily Route: PO  Start: 10/22/22 0900 End: 10/22/22 1830     (0810)     1830-D/C'd      multivitamin stress formula tablet 1 tablet  Dose: 1 tablet  Freq: Daily Route: PO  Start: 10/22/22 0900 End: 10/22/22 1830     0838     1830-D/C'd      nicotine (NICODERM CQ) 14 mg/24hr TD 24 hr patch 1 patch  Dose: 1 patch  Freq: Daily Route: TD  Start: 10/22/22 0900 End: 10/22/22 1830     0838     1629 [C]     1830-D/C'd      ondansetron (ZOFRAN) injection 4 mg  Dose: 4 mg  Freq: Once Route: IV  Start: 10/21/22 1245 End: 10/21/22 1249   Admin Instructions:   Push over 2 minutes  1249         pantoprazole (PROTONIX) injection 40 mg  Dose: 40 mg  Freq: Once Route: IV  Start: 10/21/22 1245 End: 10/21/22 1251    1249         sodium chloride 0 9 % bolus 1,000 mL  Dose: 1,000 mL  Freq:  Once Route: IV  Indications of Use: FLUID AND ELECTROLYTE DISTURBANCE  Last Dose: 1,000 mL (10/21/22 1250)  Start: 10/21/22 1230 End: 10/21/22 1350    1250         Medications 10/21 10/22     Continuous IV Infusions:  Medications 10/21 10/22   lactated ringers infusion  Rate: 200 mL/hr Dose: 200 mL/hr  Freq: Continuous Route: IV  Indications of Use: IV Hydration  Last Dose: Stopped (10/22/22 1629)  Start: 10/22/22 1015 End: 10/22/22 1830     1100     1629     1830-D/C'd      lactated ringers infusion  Rate: 150 mL/hr Dose: 150 mL/hr  Freq: Continuous Route: IV  Last Dose: 150 mL/hr (10/22/22 0610)  Start: 10/21/22 2045 End: 10/22/22 1005    2143      0610     1005-D/C'd      sodium chloride 0 9 % infusion  Rate: 200 mL/hr Dose: 200 mL/hr  Freq: Continuous Route: IV  Last Dose: 200 mL/hr (10/21/22 1804)  Start: 10/21/22 1730 End: 10/21/22 2034    1804 2034-D/C'd       sodium chloride 0 9 % infusion  Rate: 125 mL/hr Dose: 125 mL/hr  Freq: Continuous Route: IV  Last Dose: 125 mL/hr (10/21/22 1648)  Start: 10/21/22 1545 End: 10/21/22 1720    1648     1720-D/C'd         PRN Meds:  Medications 10/21 10/22   acetaminophen (TYLENOL) tablet 650 mg  Dose: 650 mg  Freq: Every 6 hours PRN Route: PO  PRN Reasons: mild pain,headaches,fever  Start: 10/21/22 2039 End: 10/22/22 1830     1830-D/C'd      albuterol (PROVENTIL HFA,VENTOLIN HFA) inhaler 2 puff  Dose: 2 puff  Freq: Every 6 hours PRN Route: IN  PRN Reasons: wheezing,shortness of breath  Start: 10/21/22 1717 End: 10/22/22 1830     1830-D/C'd      iohexol (OMNIPAQUE) 300 mg/mL injection 100 mL  Dose: 100 mL  Freq: Once in imaging Route: IV  PRN Reason: contrast  Start: 10/21/22 1249 End: 10/21/22 1249    1249         morphine injection 2 mg  Dose: 2 mg  Freq: Every 6 hours PRN Route: IV  PRN Reason: moderate pain  Start: 10/21/22 2038 End: 10/22/22 1830     1830-D/C'd      morphine injection 4 mg  Dose: 4 mg  Freq: Every 6 hours PRN Route: IV  PRN Reason: severe pain  Start: 10/21/22 2039 End: 10/22/22 1830     1830-D/C'd      ondansetron (ZOFRAN) injection 4 mg  Dose: 4 mg  Freq: Every 6 hours PRN Route: IV  PRN Reasons: nausea,vomiting  Start: 10/21/22 1601 End: 10/22/22 0612    7058      1830-D/C'd      Medications 10/21 10/22     Network Utilization Review Department  ATTENTION: Please call with any questions or concerns to [phone] and carefully listen to the prompts so that you are directed to the right person  All voicemails are confidential   Monserrat Gresham all requests for admission clinical reviews, approved or denied determinations and any other requests to dedicated fax number below belonging to the campus where the patient is receiving treatment   List of dedicated fax numbers for the Facilities:  [address] [place] [address] DENIALS (Administrative/Medical Necessity) [phone]   [address] [place] [address] (Maternity/NICU/Pediatrics) Gabby Olivares [address] Evelyn Duarte  [phone]   [address] [place] [address] [facility] [address] [facility] [address] [facility] 134 815 [facility] [phone]

## 2022-10-24 ENCOUNTER — TELEPHONE (OUTPATIENT)
Dept: FAMILY MEDICINE CLINIC | Facility: CLINIC | Age: 47
End: 2022-10-24

## 2022-10-24 ENCOUNTER — TRANSITIONAL CARE MANAGEMENT (OUTPATIENT)
Dept: FAMILY MEDICINE CLINIC | Facility: CLINIC | Age: 47
End: 2022-10-24

## 2022-10-24 DIAGNOSIS — K85.90 ACUTE PANCREATITIS, UNSPECIFIED COMPLICATION STATUS, UNSPECIFIED PANCREATITIS TYPE: Primary | ICD-10-CM

## 2022-10-24 NOTE — TELEPHONE ENCOUNTER
I spoke with Jagdish Rowland during my hospital discharge outreach  She states that she left the hospital earlier than she was supposed to  She didn't want to miss her son's homecoming  She was allowed to leave as long as she promised to maintain a clear liquid diet at home  and  if her lipase was below 600, (it was 591) she would be allowed to leave   Now, she regrets her decision since she isn't feeling well  No worse, but not really better  She still has some abd discomfort/bloating but tolerating her liquids and is afebrile  She is resting  (slept a lot yesterday) She is coming in tomorrow am but wants to know if you think she should have her Lipase checked again today? She knows to go to the ER if any worsening s/s   [facility]

## 2022-10-25 ENCOUNTER — TELEPHONE (OUTPATIENT)
Dept: GASTROENTEROLOGY | Facility: CLINIC | Age: 47
End: 2022-10-25

## 2022-10-25 ENCOUNTER — OFFICE VISIT (OUTPATIENT)
Dept: FAMILY MEDICINE CLINIC | Facility: CLINIC | Age: 47
End: 2022-10-25
Payer: COMMERCIAL

## 2022-10-25 VITALS
SYSTOLIC BLOOD PRESSURE: 104 MMHG | BODY MASS INDEX: 26.68 KG/M2 | HEIGHT: 62 IN | RESPIRATION RATE: 16 BRPM | HEART RATE: 80 BPM | WEIGHT: 145 LBS | TEMPERATURE: 97 F | DIASTOLIC BLOOD PRESSURE: 70 MMHG

## 2022-10-25 DIAGNOSIS — K85.80 OTHER ACUTE PANCREATITIS, UNSPECIFIED COMPLICATION STATUS: Primary | ICD-10-CM

## 2022-10-25 DIAGNOSIS — E11.42 DIABETIC POLYNEUROPATHY ASSOCIATED WITH TYPE 2 DIABETES MELLITUS (HCC): ICD-10-CM

## 2022-10-25 DIAGNOSIS — R10.11 RIGHT UPPER QUADRANT ABDOMINAL PAIN: ICD-10-CM

## 2022-10-25 LAB — LIPASE SERPL-CCNC: 130 U/L (ref 14–72)

## 2022-10-25 PROCEDURE — 92250 FUNDUS PHOTOGRAPHY W/I&R: CPT | Performed by: NURSE PRACTITIONER

## 2022-10-25 PROCEDURE — 99495 TRANSJ CARE MGMT MOD F2F 14D: CPT | Performed by: NURSE PRACTITIONER

## 2022-10-25 NOTE — PROGRESS NOTES
Assessment/Plan:    Lipase normalizing  Symptoms improving  Will be seeing GI    1  Other acute pancreatitis, unspecified complication status    2  Diabetic polyneuropathy associated with type 2 diabetes mellitus ([address] )  Assessment & Plan:  She will call to reschedule endo appt  Discussed importance of good diabetic control    Lab Results   Component Value Date    HGBA1C 10 2 (H) 10/20/2022       Orders:  -     IRIS Diabetic eye exam    3  Right upper quadrant abdominal pain  Assessment & Plan:  She will call GI for sooner appt  To let me know if she has any worsening or new symptoms              There are no Patient Instructions on file for this visit  No follow-ups on file  Subjective:      Patient ID: Tarik Stubbs is a 56 y o  female  Chief Complaint   Patient presents with   • Transition of Care Management     Mehul BUENROSTRO       Following up today on hospitalization for abdominal pain and elevated lipase  Was discharged home, still had continued abdominal pain, had outpatient lipase drawn yesterday  Abdominal pain much better today  Still not eating many solids  Taking Prilosec and Pepcid  Scheduled GI f/u online, appt in 1 month  Due for f/u with endo      TCM Call     Date and time call was made  10/24/2022 10:03 AM    Hospital care reviewed  Records reviewed    Patient was hospitialized at  [address] [place] [address]    Date of Admission  10/21/22    Date of discharge  10/22/22    Diagnosis  Abdominal Pain    Disposition  Home    Were the patients medications reviewed and updated  Yes    Current Symptoms  Fatigue  Abdominal discomfort- bloating " a few sharp pains occasionally" Right upper back sore      TCM Call     Post hospital issues  None    Should patient be enrolled in anticoag monitoring? No    Scheduled for follow up?   Yes    Patients specialists  Other (comment)    Other specialists names  [address] [facility] [place] [facility] (Gastroenterology)    Did you obtain your prescribed medications  Yes    Do you need help managing your prescriptions or medications  No    Is transportation to your appointment needed  No    I have advised the patient to call PCP with any new or worsening symptoms  [facility] [address] [place] [address] or [facility] other    Support System  Family    The type of support provided  Physical; Emotional    Do you have social support  Yes, as much as I need    Are you recieving any outpatient services  No    Are you recieving home care services  No    Interperter language line needed  No    Counseling  Patient    Counseling topics  Activities of daily living; Importance of RX compliance; instructions for management; Risk factor reduction; patient and family education    Comments  I spoke maidamike Chandra who states that she is still not feeling well  She left the hospital earlier than she was supposed to but she didn't want to miss her son's homecoming  She is not worse, but not much better  She still has some abd discomfort/bloating but is tolerating her clear liquid diet  No fever  Appt made for tomorrow am but she knows to go to the ER if any chest pain, dypsnea, worsening pain, fever, vomiting, etc Enoc Henderson        The following portions of the patient's history were reviewed and updated as appropriate: allergies, current medications, past family history, past medical history, past social history, past surgical history and problem list     Review of Systems   Constitutional: Negative  Respiratory: Negative  Cardiovascular: Negative  Gastrointestinal: Positive for abdominal pain and nausea  Negative for constipation and vomiting  Genitourinary: Negative            Current Outpatient Medications   Medication Sig Dispense Refill   • albuterol (PROVENTIL HFA,VENTOLIN HFA) 90 mcg/act inhaler Inhale 2 puffs every 6 (six) hours as needed for wheezing or shortness of breath 1 Inhaler 1   • Dapagliflozin-metFORMIN HCl ER (Xigduo XR) 5-500 MG TB24 Take 1 tablet by mouth daily 30 tablet 3   • escitalopram (LEXAPRO) 10 mg tablet take 1 tablet by mouth once daily 90 tablet 1   • gabapentin (NEURONTIN) 800 mg tablet Take 1 tablet (800 mg total) by mouth 3 (three) times a day 90 tablet 5   • linaGLIPtin (Tradjenta) 5 MG TABS Take 5 mg by mouth daily Tradjenta 1 tablet daily     • Multiple Vitamin (DAILY VALUE MULTIVITAMIN) TABS Take by mouth daily      • ondansetron (ZOFRAN-ODT) 8 mg disintegrating tablet Take 1 tablet (8 mg total) by mouth every 8 (eight) hours as needed for nausea or vomiting 30 tablet 0   • OneTouch Ultra test strip Use 1 each 2 (two) times a day 180 strip 3   • ramipril (ALTACE) 2 5 mg capsule take 1 capsule by mouth once daily 90 capsule 0   • repaglinide (PRANDIN) 2 mg tablet Take 1 tablet (2 mg total) by mouth 3 (three) times a day before meals 90 tablet 5   • nicotine (NICODERM CQ) 14 mg/24hr TD 24 hr patch Place 1 patch on the skin daily Do not start before October 23, 2022  (Patient not taking: Reported on 10/25/2022) 28 patch 0     No current facility-administered medications for this visit  Objective:    /70   Pulse 80   Temp (!) 97 °F (36 1 °C)   Resp 16   Ht 5' 2" (1 575 m)   Wt 65 8 kg (145 lb)   LMP 10/02/2022 (Approximate)   BMI 26 52 kg/m²        Physical Exam  Vitals and nursing note reviewed  Constitutional:       Appearance: She is well-developed  Cardiovascular:      Rate and Rhythm: Normal rate and regular rhythm  Heart sounds: Normal heart sounds  No murmur heard  Pulmonary:      Effort: Pulmonary effort is normal       Breath sounds: Normal breath sounds  Abdominal:      General: Bowel sounds are normal       Tenderness: There is abdominal tenderness (mild, RUQ/epigastric)  Skin:     General: Skin is warm and dry  Neurological:      Mental Status: She is alert     Psychiatric:         Mood and Affect: Mood normal          Behavior: Behavior normal                 Marlin Modestaith

## 2022-10-25 NOTE — LETTER
October 25, 2022     Patient: Neymar Mobley  YOB: 1975  Date of Visit: 10/25/2022      To Whom it May Concern:    Jeny Thomas is under my professional care  Perry Schultz was seen in my office on 10/25/2022  Please excuse from work week of 10/24/22  If you have any questions or concerns, please don't hesitate to call           Sincerely,          ASA Amor        CC: No Recipients

## 2022-10-25 NOTE — ASSESSMENT & PLAN NOTE
She will call to reschedule endo appt     Discussed importance of good diabetic control    Lab Results   Component Value Date    HGBA1C 10 2 (H) 10/20/2022

## 2022-10-25 NOTE — TELEPHONE ENCOUNTER
lmm for pt to call back and r/s appt from 10/26/22 to a different day due to provider unavailable that day   sb

## 2022-10-25 NOTE — UTILIZATION REVIEW
NOTIFICATION OF ADMISSION DISCHARGE   This is a Notification of Discharge from [address]  Please be advised that this patient has been discharge from our facility  Below you will find the admission and discharge date and time including the patient’s disposition  UTILIZATION REVIEW CONTACT:  Rodney Burk  Utilization   Network Utilization Review Department  Phone: [phone] x carefully listen to the prompts  All voicemails are confidential   Email: [email or web address]     ADMISSION INFORMATION  PRESENTATION DATE: 10/21/2022 12:24 PM  OBERVATION ADMISSION DATE:   INPATIENT ADMISSION DATE: 10/21/22  1:56 PM   DISCHARGE DATE: 10/22/2022  4:29 PM  DISPOSITION: Home/Self Care Home/Self Care      IMPORTANT INFORMATION:  Send all requests for admission clinical reviews, approved or denied determinations and any other requests to dedicated fax number below belonging to the campus where the patient is receiving treatment   List of dedicated fax numbers:  [address] [place] [address] DENIALS (Administrative/Medical Necessity) [phone]   [address] (Maternity/NICU/Pediatrics) [phone]   [facility] [phone]   [facility] [phone]   [address] [phone]   220 [facility] [phone] 90 [facility] [phone]   [address] [place], [place] [address] 119 [phone]   [facility]  [phone] 4058 [facility] [phone]   412 [facility] 850 E [facility] [phone]

## 2022-10-26 ENCOUNTER — OFFICE VISIT (OUTPATIENT)
Dept: ENDOCRINOLOGY | Facility: CLINIC | Age: 47
End: 2022-10-26

## 2022-10-26 VITALS
BODY MASS INDEX: 26.79 KG/M2 | SYSTOLIC BLOOD PRESSURE: 138 MMHG | HEIGHT: 62 IN | WEIGHT: 145.6 LBS | DIASTOLIC BLOOD PRESSURE: 88 MMHG | HEART RATE: 90 BPM

## 2022-10-26 DIAGNOSIS — K31.84 GASTROPARESIS DUE TO DM (HCC): ICD-10-CM

## 2022-10-26 DIAGNOSIS — E11.43 GASTROPARESIS DUE TO DM (HCC): ICD-10-CM

## 2022-10-26 DIAGNOSIS — E11.65 TYPE 2 DIABETES MELLITUS WITH HYPERGLYCEMIA, WITHOUT LONG-TERM CURRENT USE OF INSULIN (HCC): Primary | ICD-10-CM

## 2022-10-26 DIAGNOSIS — E11.42 DIABETIC POLYNEUROPATHY ASSOCIATED WITH TYPE 2 DIABETES MELLITUS (HCC): ICD-10-CM

## 2022-10-26 RX ORDER — METFORMIN HYDROCHLORIDE 500 MG/1
500 TABLET, EXTENDED RELEASE ORAL 2 TIMES DAILY WITH MEALS
Qty: 180 TABLET | Refills: 1 | Status: SHIPPED | OUTPATIENT
Start: 2022-10-26

## 2022-10-26 NOTE — LETTER
Medical Fitness Referral    Patient: Regis Cain  : 1975  MRN: [id number]  Address: [facility] [address] [place] [address]  Phone Number: [phone]  E-mail: [email or web address]    [] Medical Disorders (Ex  Diabetes)   [] Cardiovascular Disorders (Ex  Hypertension)   [] Pulmonary Disorders (Ex  Asthma)   [] Cancer Disorders (Ex  Breast, Prostate)   [] Immunological & Hematological Disorders (Rheumatoid Arthritis)   [] Neurological Disorders (Ex  Parkinson's Disease)   [] Cognitive Disorders (Ex  Dementia)   [] Children & Adolescents (Ex  BMI)   [] Older Adults (Ex  Balance & Ambulation)   [] Female Specific Disorders (Ex  Osteoporosis)       Diagnoses:   1  Type 2 diabetes mellitus with hyperglycemia, without long-term current use of insulin (HCC)  Dapagliflozin Propanediol 5 MG TABS    metFORMIN (GLUCOPHAGE-XR) 500 mg 24 hr tablet   2  Gastroparesis due to DM ([facility] )     3   Diabetic polyneuropathy associated with type 2 diabetes mellitus ([facility] )       Additional Comments:    Service Requested:  [x] Medical Fitness Consult- Screening For Appropriateness of Medical Fitness Program   [x] Medical Fitness Program- Assessment of Body Composition, Aerobic, Anaerobic, Muscle Strength & Endurance, Flexibility, Neuromuscular & Functional Fitness   [x] Medical Fitness Assessment- Individualized Evidence-Based Exercise Program       Requesting Provider: Navi Bragg  Date: 10/26/22

## 2022-10-26 NOTE — PROGRESS NOTES
Follow-up Patient Progress Note      CC: type 2 diabetes    History of Present Illness:   56 yr female with type 2 diabetes for 12 yrs, hx of GDM in 2005, gastroparesis, neuropathy, vitamin D deficiency, asthma  She was recently treated for acute mild pancreatitis that improved with 24 hrs of NPO and IVF  She was previously on insulin last year  In past she used a cgm but couldn't avoid it  Home blood glucose monitoring: checks 1/day and notes glucose in 170-250 mg/dL  Current meds:  Xigduo(dapa/metformin) 5-500 po daily  Tradjenta 5mg po daily  Prandin 2mg po tidac    Opthamology: yes,   Podiatry:   vaccination:   Dental:  Pancreatitis: yes    Ace/ARB: ramipril  Statin: no  Thyroid issues: no    Patient Active Problem List   Diagnosis   • Adverse reaction to statin medication   • Allergic asthma, mild intermittent, uncomplicated   • Anxiety and depression   • Diabetes mellitus with neurological manifestations, uncontrolled   • Diabetes mellitus with polyneuropathy (HCC)   • Burning sensation   • Fatigue   • Left breast mass   • Low back pain   • Nicotine dependence   • Overweight (BMI 25 0-29  9)   • AC joint arthropathy   • Plantar fasciitis   • Onychomycosis   • Globus sensation   • Gastroparesis due to DM [facility])   • Primary hypertension   • Abdominal pain     Past Medical History:   Diagnosis Date   • Acute gastritis     [id number] RESOLVED   • Allergic    • Asthma    • Breast pain     [id number] RESOLVED   • COVID-19 virus infection 12/1/2021   • Diabetes ([address] )     MELLITUS   • Diabetes mellitus ([facility] )    • Viral gastroenteritis     [id number] RESOLVED      Past Surgical History:   Procedure Laterality Date   • HAND SURGERY     • SKIN BIOPSY        Family History   Problem Relation Age of Onset   • Hypertension Mother    • Breast cancer Family    • Colon cancer Family    • Diabetes Family         MELLITUS   • Lung cancer Family    • Mental illness Neg Hx    • Substance Abuse Neg Hx      Social History Tobacco Use   • Smoking status: Current Every Day Smoker     Packs/day: 0 50   • Smokeless tobacco: Never Used   Substance Use Topics   • Alcohol use: Not Currently     Comment: 2-3 drinks      No Known Allergies      Current Outpatient Medications:   •  albuterol (PROVENTIL HFA,VENTOLIN HFA) 90 mcg/act inhaler, Inhale 2 puffs every 6 (six) hours as needed for wheezing or shortness of breath, Disp: 1 Inhaler, Rfl: 1  •  Dapagliflozin-metFORMIN HCl ER (Xigduo XR) 5-500 MG TB24, Take 1 tablet by mouth daily, Disp: 30 tablet, Rfl: 3  •  escitalopram (LEXAPRO) 10 mg tablet, take 1 tablet by mouth once daily, Disp: 90 tablet, Rfl: 1  •  gabapentin (NEURONTIN) 800 mg tablet, Take 1 tablet (800 mg total) by mouth 3 (three) times a day, Disp: 90 tablet, Rfl: 5  •  linaGLIPtin (Tradjenta) 5 MG TABS, Take 5 mg by mouth daily Tradjenta 1 tablet daily, Disp: , Rfl:   •  Multiple Vitamin (DAILY VALUE MULTIVITAMIN) TABS, Take by mouth daily , Disp: , Rfl:   •  nicotine (NICODERM CQ) 14 mg/24hr TD 24 hr patch, Place 1 patch on the skin daily Do not start before October 23, 2022  (Patient not taking: Reported on 10/25/2022), Disp: 28 patch, Rfl: 0  •  ondansetron (ZOFRAN-ODT) 8 mg disintegrating tablet, Take 1 tablet (8 mg total) by mouth every 8 (eight) hours as needed for nausea or vomiting, Disp: 30 tablet, Rfl: 0  •  OneTouch Ultra test strip, Use 1 each 2 (two) times a day, Disp: 180 strip, Rfl: 3  •  ramipril (ALTACE) 2 5 mg capsule, take 1 capsule by mouth once daily, Disp: 90 capsule, Rfl: 0  •  repaglinide (PRANDIN) 2 mg tablet, Take 1 tablet (2 mg total) by mouth 3 (three) times a day before meals, Disp: 90 tablet, Rfl: 5    Review of Systems   Constitutional: Positive for fatigue  HENT: Negative  Eyes: Negative  Respiratory: Negative  Cardiovascular: Negative  Gastrointestinal: Negative  Endocrine: Negative  Musculoskeletal: Negative  Skin: Negative  Allergic/Immunologic: Negative  Neurological: Negative  Hematological: Negative  Psychiatric/Behavioral: Negative  Physical Exam:  Body mass index is 26 63 kg/m²  /88 (BP Location: Left arm, Patient Position: Sitting, Cuff Size: Standard)   Pulse 90   Ht 5' 2" (1 575 m)   Wt 66 kg (145 lb 9 6 oz)   LMP 10/02/2022 (Approximate)   BMI 26 63 kg/m²    Vitals:    10/26/22 1123   Weight: 66 kg (145 lb 9 6 oz)        Physical Exam  Constitutional:       Appearance: She is well-developed  HENT:      Head: Normocephalic  Eyes:      Pupils: Pupils are equal, round, and reactive to light  Neck:      Thyroid: No thyromegaly  Cardiovascular:      Rate and Rhythm: Normal rate  Heart sounds: Normal heart sounds  Pulmonary:      Effort: Pulmonary effort is normal       Breath sounds: Normal breath sounds  Abdominal:      General: Bowel sounds are normal       Palpations: Abdomen is soft  Musculoskeletal:         General: No deformity  Cervical back: Normal range of motion  Skin:     Capillary Refill: Capillary refill takes less than 2 seconds  Coloration: Skin is not pale  Findings: No rash  Neurological:      Mental Status: She is alert and oriented to person, place, and time           Labs:   Lab Results   Component Value Date    HGBA1C 10 2 (H) 10/20/2022       Lab Results   Component Value Date    ZEH6LULIKONO 0 79 06/07/2017       Lab Results   Component Value Date    CREATININE 0 89 10/22/2022    CREATININE 0 94 10/21/2022    CREATININE 0 70 10/20/2022    BUN 9 10/22/2022     06/07/2017    K 4 2 10/22/2022     10/22/2022    CO2 21 10/22/2022     eGFR   Date Value Ref Range Status   10/22/2022 77 ml/min/1 73sq m Final       Lab Results   Component Value Date    ALT 14 10/22/2022    AST 13 10/22/2022    ALKPHOS 77 10/22/2022    BILITOT 0 6 06/07/2017       Lab Results   Component Value Date    CHOLESTEROL 140 01/24/2018     Lab Results   Component Value Date    HDL 55 01/24/2018 HDL 56 02/17/2017     Lab Results   Component Value Date    TRIG 301 (H) 10/22/2022    TRIG 52 01/24/2018    TRIG 72 02/17/2017     Lab Results   Component Value Date    [address] 107 02/17/2017         Impression:  1  Type 2 diabetes mellitus with hyperglycemia, without long-term current use of insulin ([place] [address] )    2  Gastroparesis due to DM ([place] [address] )    3  Diabetic polyneuropathy associated with type 2 diabetes mellitus ([place] [address] )         Plan:    Diagnoses and all orders for this visit:    Type 2 diabetes mellitus with hyperglycemia, without long-term current use of insulin ([place] [address] )  She seems uncontrolled with A1c 9%  Goal is 7%  Today we discussed all aspects of diabetes including pathophysiology, risk factors, complications, SAGM, CGM, diet, lifestyle modifications, medical fitness training, diabetes education, goals of therapy, follow up needs and medications including insulin, metformin and SGLT2i  Advised to maintain goal blood sugars 70-180mg/dL  Today we increased metformin, continue dapagliflozin, stopped linagliptin due to pancreatitis concern and continued Prandin  She will start diet and lifestyle changes  Advised to send glucose logs  Follow up in 3 months    -     Dapagliflozin Propanediol 5 MG TABS; Take 1 tablet (5 mg total) by mouth daily  -     metFORMIN (GLUCOPHAGE-XR) 500 mg 24 hr tablet; Take 1 tablet (500 mg total) by mouth 2 (two) times a day with meals    Gastroparesis due to DM ([place] [address] )    Diabetic polyneuropathy associated with type 2 diabetes mellitus ([place] [address] )        I have spent 54 minutes with patient today in which greater than 50% of this time was spent in counseling/coordination of care  Discussed with the patient and all questioned fully answered  She will call me if any problems arise  Educated/ Counseled patient on diagnostic test results, prognosis, risk vs benefit of treatment options, importance of treatment compliance, healthy life and lifestyle choices        Dajuan Garcia Fritz

## 2022-10-27 NOTE — TELEPHONE ENCOUNTER
I lmom for pt to please call back to reschedule her appt that we had to cancel  Will call pt again in one week if do not hear back from her

## 2022-11-01 ENCOUNTER — OFFICE VISIT (OUTPATIENT)
Dept: FAMILY MEDICINE CLINIC | Facility: CLINIC | Age: 47
End: 2022-11-01

## 2022-11-01 VITALS
BODY MASS INDEX: 26.31 KG/M2 | DIASTOLIC BLOOD PRESSURE: 70 MMHG | RESPIRATION RATE: 16 BRPM | HEART RATE: 88 BPM | TEMPERATURE: 97.4 F | WEIGHT: 143 LBS | HEIGHT: 62 IN | SYSTOLIC BLOOD PRESSURE: 120 MMHG

## 2022-11-01 DIAGNOSIS — F41.9 ANXIETY AND DEPRESSION: ICD-10-CM

## 2022-11-01 DIAGNOSIS — E11.42 DIABETIC POLYNEUROPATHY ASSOCIATED WITH TYPE 2 DIABETES MELLITUS (HCC): ICD-10-CM

## 2022-11-01 DIAGNOSIS — F32.A ANXIETY AND DEPRESSION: ICD-10-CM

## 2022-11-01 DIAGNOSIS — R10.84 GENERALIZED ABDOMINAL PAIN: Primary | ICD-10-CM

## 2022-11-01 RX ORDER — DICYCLOMINE HYDROCHLORIDE 10 MG/1
10 CAPSULE ORAL
Qty: 30 CAPSULE | Refills: 1 | Status: SHIPPED | OUTPATIENT
Start: 2022-11-01

## 2022-11-01 NOTE — ASSESSMENT & PLAN NOTE
Saw bert, medications were adjusted     Has f/u arranged     Lab Results   Component Value Date    HGBA1C 10 2 (H) 10/20/2022

## 2022-11-01 NOTE — LETTER
November 1, 2022     Patient: Latisha Nunez  YOB: 1975  Date of Visit: 11/1/2022      To Whom it May Concern:    Joshua Julian is under my professional care  Jann Snow was seen in my office on 11/1/2022  Please excuse from work 10/31/22    If you have any questions or concerns, please don't hesitate to call           Sincerely,          ASA Nolen        CC: No Recipients

## 2022-11-01 NOTE — PROGRESS NOTES
Assessment/Plan:    Unclear etiology  Has f/u with GI scheduled  Will start on Bentyl prn    1  Generalized abdominal pain  -     dicyclomine (BENTYL) 10 mg capsule; Take 1 capsule (10 mg total) by mouth 4 (four) times a day (before meals and at bedtime)    2  Diabetic polyneuropathy associated with type 2 diabetes mellitus ([facility] [address] )  Assessment & Plan:  Saw endo, medications were adjusted  Has f/u arranged     Lab Results   Component Value Date    HGBA1C 10 2 (H) 10/20/2022         3  Anxiety and depression  Assessment & Plan:  Stable, continue Escitalopram              There are no Patient Instructions on file for this visit  No follow-ups on file  Subjective:      Patient ID: Ina Ren is a 56 y o  female  Chief Complaint   Patient presents with   • Follow-up   • Fatigue     States that she is still not feeling well  Fatigued and appetite is fair and has some abd discomfort [id number]       Following up on abdominal pain  Has continued discomfort, increased gas, fatigue, and generally doesn't feel well  Still taking Prilosec  Has more frequent, loose bowels  Has f/u with GI arranged  Lipase previously checked and was improving        The following portions of the patient's history were reviewed and updated as appropriate: allergies, current medications, past family history, past medical history, past social history, past surgical history and problem list     Review of Systems   Constitutional: Positive for fatigue  Gastrointestinal: Positive for abdominal pain and nausea  All other systems reviewed and are negative          Current Outpatient Medications   Medication Sig Dispense Refill   • albuterol (PROVENTIL HFA,VENTOLIN HFA) 90 mcg/act inhaler Inhale 2 puffs every 6 (six) hours as needed for wheezing or shortness of breath 1 Inhaler 1   • Dapagliflozin Propanediol 5 MG TABS Take 1 tablet (5 mg total) by mouth daily 90 tablet 1   • dicyclomine (BENTYL) 10 mg capsule Take 1 capsule (10 mg total) by mouth 4 (four) times a day (before meals and at bedtime) 30 capsule 1   • escitalopram (LEXAPRO) 10 mg tablet take 1 tablet by mouth once daily 90 tablet 1   • gabapentin (NEURONTIN) 800 mg tablet Take 1 tablet (800 mg total) by mouth 3 (three) times a day 90 tablet 5   • metFORMIN (GLUCOPHAGE-XR) 500 mg 24 hr tablet Take 1 tablet (500 mg total) by mouth 2 (two) times a day with meals 180 tablet 1   • Multiple Vitamin (DAILY VALUE MULTIVITAMIN) TABS Take by mouth daily      • ondansetron (ZOFRAN-ODT) 8 mg disintegrating tablet Take 1 tablet (8 mg total) by mouth every 8 (eight) hours as needed for nausea or vomiting 30 tablet 0   • OneTouch Ultra test strip Use 1 each 2 (two) times a day 180 strip 3   • ramipril (ALTACE) 2 5 mg capsule take 1 capsule by mouth once daily 90 capsule 0   • repaglinide (PRANDIN) 2 mg tablet Take 1 tablet (2 mg total) by mouth 3 (three) times a day before meals 90 tablet 5   • nicotine (NICODERM CQ) 14 mg/24hr TD 24 hr patch Place 1 patch on the skin daily Do not start before October 23, 2022  (Patient not taking: No sig reported) 28 patch 0     No current facility-administered medications for this visit  Objective:    /70   Pulse 88   Temp (!) 97 4 °F (36 3 °C)   Resp 16   Ht 5' 2" (1 575 m)   Wt 64 9 kg (143 lb)   LMP 10/26/2022 (Exact Date)   BMI 26 16 kg/m²         Physical Exam  Vitals and nursing note reviewed  Constitutional:       Appearance: Normal appearance  She is well-developed  Cardiovascular:      Rate and Rhythm: Normal rate and regular rhythm  Pulses: Normal pulses  Heart sounds: Normal heart sounds  No murmur heard  Pulmonary:      Effort: Pulmonary effort is normal       Breath sounds: Normal breath sounds  Abdominal:      Tenderness: There is abdominal tenderness in the epigastric area and periumbilical area  Skin:     General: Skin is warm and dry  Neurological:      Mental Status: She is alert     Psychiatric: Behavior: Behavior normal                 Marlin Ellis

## 2022-11-04 ENCOUNTER — OFFICE VISIT (OUTPATIENT)
Dept: GASTROENTEROLOGY | Facility: CLINIC | Age: 47
End: 2022-11-04

## 2022-11-04 VITALS
TEMPERATURE: 98.1 F | DIASTOLIC BLOOD PRESSURE: 70 MMHG | WEIGHT: 142 LBS | HEIGHT: 62 IN | HEART RATE: 88 BPM | BODY MASS INDEX: 26.13 KG/M2 | SYSTOLIC BLOOD PRESSURE: 124 MMHG

## 2022-11-04 DIAGNOSIS — Z12.11 SCREEN FOR COLON CANCER: ICD-10-CM

## 2022-11-04 DIAGNOSIS — R74.8 ELEVATED LIPASE: ICD-10-CM

## 2022-11-04 DIAGNOSIS — R10.9 ABDOMINAL PAIN: ICD-10-CM

## 2022-11-04 DIAGNOSIS — R10.13 DYSPEPSIA: Primary | ICD-10-CM

## 2022-11-04 NOTE — PATIENT INSTRUCTIONS
Scheduled date of colonoscopy/egd (as of today): 12/1/22  Physician performing colonoscopy: Dr Kyle Barrera  Location of colonoscopy: [facility]  Bowel prep reviewed with patient: kunal/dulcolax  Instructions reviewed with patient by: Pepe Dior  Clearances:   n/a

## 2022-11-04 NOTE — PROGRESS NOTES
[facility] - Outpatient Consultation  Olga Lidia Ron 56 y o  female MRN: [id number]  Encounter: [id number]          ASSESSMENT AND PLAN:    1  Dyspepsia  EGD      2  Abdominal pain  Ambulatory referral to Gastroenterology    EGD      3  Elevated lipase  Ambulatory referral to Gastroenterology      4  Screen for colon cancer  Colonoscopy    polyethylene glycol (GOLYTELY) 4000 mL solution    bisacodyl (DULCOLAX) 5 mg EC tablet        Persistent pain and discomfort improved after coming off of linagliptin (known to cause pancreatitis)  Doing much better but with occasional bouts of pain  No history of EGD and colonoscopy in the past   She is due for colorectal cancer screening given her age so will proceed with EGD and colonoscopy to further evaluate  The rationale for endoscopy/colonoscopy with possible biopsy, possible polypectomy, with IV sedation as well as all risks, benefits, and alternatives were discussed with the patient in detail  Risks including but not limited to perforation, bleeding, need for blood transfusion or emergent surgery, and missed neoplasm were reviewed in detail with the patient  The patient demonstrates full understanding and wishes to proceed  ______________________________________________________________    HPI:  Olga Lidia Ron is a 56y o  year old female who presents to the office for evaluation of pancreatitis  Patient was admitted to [facility] on 10/21 with abdominal pain and elevated lipase  Prior to admission she was having abdominal pain for 5 days and diarrhea for 2 days  Outpatient blood work revealed mildly elevated lipase of 653  CT scan revealed no evidence of pancreatitis  There is no evidence of choledocholithiasis or cholelithiasis  Of note, there was noted to be right-sided abdominal wall inflammation noted on the ultrasound  I personally reviewed imaging in PACS      She does have history of type 2 diabetes for 12 years complicated by neuropathy, gastroparesis  She was seen by endocrinology last week  She was on linagliptin in the could be trigger for her pancreatitis  She has been discontinued from this medicine and continued on prandin and dapagliflozin, with an increase in her metformin  Does take TUMS and prevacid when her stomach acts up  Does have significant burping all day long  Will have occasional bouts of nausea  Today patient has resolution of symptoms  REVIEW OF SYSTEMS:    CONSTITUTIONAL: Denies any fever, chills, rigors, and weight loss  HEENT: No earache or tinnitus  Denies hearing loss or visual disturbances  CARDIOVASCULAR: No chest pain or palpitations  RESPIRATORY: Denies any cough, hemoptysis, shortness of breath or dyspnea on exertion  GASTROINTESTINAL: As noted in the History of Present Illness  GENITOURINARY: No problems with urination  Denies any hematuria or dysuria  NEUROLOGIC: No dizziness or vertigo, denies headaches  MUSCULOSKELETAL: Denies any muscle or joint pain  SKIN: Denies skin rashes or itching  ENDOCRINE: Denies excessive thirst  Denies intolerance to heat or cold  PSYCHOSOCIAL: Denies depression or anxiety  Denies any recent memory loss         Historical Information   Past Medical History:   Diagnosis Date   • Acute gastritis     [id number] RESOLVED   • Allergic    • Asthma    • Breast pain     [id number] RESOLVED   • COVID-19 virus infection 12/1/2021   [facility]     MELLITUS   • Diabetes mellitus ([facility] )    • Viral gastroenteritis     [id number] RESOLVED     Past Surgical History:   Procedure Laterality Date   • HAND SURGERY     • SKIN BIOPSY       Social History   Social History     Substance and Sexual Activity   Alcohol Use Not Currently    Comment: 2-3 drinks      Social History     Substance and Sexual Activity   Drug Use No     Social History     Tobacco Use   Smoking Status Current Every Day Smoker   • Packs/day: 0 50   Smokeless Tobacco Never Used Family History   Problem Relation Age of Onset   • Hypertension Mother    • Breast cancer Family    • Colon cancer Family    • Diabetes Family         MELLITUS   • Lung cancer Family    • Mental illness Neg Hx    • Substance Abuse Neg Hx        Meds/Allergies       Current Outpatient Medications:   •  albuterol (PROVENTIL HFA,VENTOLIN HFA) 90 mcg/act inhaler  •  Dapagliflozin Propanediol 5 MG TABS  •  dicyclomine (BENTYL) 10 mg capsule  •  escitalopram (LEXAPRO) 10 mg tablet  •  gabapentin (NEURONTIN) 800 mg tablet  •  metFORMIN (GLUCOPHAGE-XR) 500 mg 24 hr tablet  •  Multiple Vitamin (DAILY VALUE MULTIVITAMIN) TABS  •  ondansetron (ZOFRAN-ODT) 8 mg disintegrating tablet  •  OneTouch Ultra test strip  •  ramipril (ALTACE) 2 5 mg capsule  •  repaglinide (PRANDIN) 2 mg tablet  •  nicotine (NICODERM CQ) 14 mg/24hr TD 24 hr patch    No Known Allergies        Objective     Blood pressure 124/70, pulse 88, temperature 98 1 °F (36 7 °C), temperature source Tympanic, height 5' 2" (1 575 m), weight 64 4 kg (142 lb), last menstrual period 10/26/2022, not currently breastfeeding  Body mass index is 25 97 kg/m²  PHYSICAL EXAM:      General Appearance:   Alert, cooperative, no distress   HEENT:   Normocephalic, atraumatic, anicteric  Lungs:   Equal chest rise and unlabored breathing, normal cough   Heart:   No visualized JVD   Abdomen:   Soft, non-tender, non-distended; no masses, no organomegaly    Genitalia:   Deferred    Rectal:   Deferred    Extremities:  No cyanosis, clubbing or edema    Pulses:  Musculoskeletal:  2+ and symmetric  Normal range of motion visualized    Skin:  Neuro:  No jaundice, rashes, or lesions   Alert and appropriate       Lab Results:   No visits with results within 1 Day(s) from this visit     Latest known visit with results is:   Telephone on 10/24/2022   Component Date Value   • Lipase, Serum 10/24/2022 130 (A)         Radiology Results:   US right upper quadrant    Result Date: 10/20/2022  Narrative: RIGHT UPPER QUADRANT ULTRASOUND INDICATION:     R10 11: Right upper quadrant pain  COMPARISON:  None TECHNIQUE:   Real-time ultrasound of the right upper quadrant was performed with a curvilinear transducer with both volumetric sweeps and still imaging techniques  FINDINGS: PANCREAS:  Visualized portions of the pancreas are within normal limits  AORTA AND IVC:  Visualized portions are normal for patient age  LIVER: Size:  Within normal range  The liver measures 16 3 cm in the midclavicular line  Contour:  Surface contour is smooth  Parenchyma:  Echogenicity and echotexture are within normal limits  No liver mass identified  Limited imaging of the main portal vein shows it to be patent and hepatopetal   BILIARY: No gallbladder findings  No intrahepatic biliary dilatation  CBD measures 4 0 mm  No choledocholithiasis  KIDNEY: Right kidney measures 10 7 x 5 6 x 4 9 cm  Volume 154 0 mL Kidney within normal limits  ASCITES:   None  Impression: Normal  Workstation performed: [id number]     CT abdomen pelvis with contrast    Result Date: 10/21/2022  Narrative: CT ABDOMEN AND PELVIS WITH IV CONTRAST INDICATION:   abd pain  COMPARISON:  Correlation is made with the right upper quadrant abdominal ultrasound study dated 10/20/2022  TECHNIQUE:  CT examination of the abdomen and pelvis was performed  Axial, sagittal, and coronal 2D reformatted images were created from the source data and submitted for interpretation  Radiation dose length product (DLP) for this visit:  452 92 mGy-cm   This examination, like all CT scans performed in the [facility], was performed utilizing techniques to minimize radiation dose exposure, including the use of iterative  reconstruction and automated exposure control  IV Contrast:  100 mL of iohexol (OMNIPAQUE) Enteric Contrast:  Enteric contrast was not administered   FINDINGS: ABDOMEN LOWER CHEST:  3 mm pulmonary nodule left lower lobe on series 2, image 13  LIVER/BILIARY TREE:  Unremarkable  GALLBLADDER:  No calcified gallstones  No pericholecystic inflammatory change  SPLEEN:  Unremarkable  PANCREAS:  Unremarkable  ADRENAL GLANDS:  Unremarkable  KIDNEYS/URETERS:  Unremarkable  No hydronephrosis  STOMACH AND BOWEL:  There is no bowel obstruction  No focal inflammatory process  APPENDIX:  A normal appendix was visualized  ABDOMINOPELVIC CAVITY:  No ascites  No pneumoperitoneum  No lymphadenopathy  VESSELS:  The abdominal aorta is calcified but normal in caliber  No retroperitoneal hematoma  PELVIS REPRODUCTIVE ORGANS:  Unremarkable for patient's age  URINARY BLADDER:  Unremarkable  ABDOMINAL WALL/INGUINAL REGIONS:  Nonspecific mild inflammatory stranding in the right anterior abdominal wall as seen on series 2, image 38  Tiny fat-containing periumbilical hernia  OSSEOUS STRUCTURES:  No acute fracture or destructive osseous lesion  Impression: Mild inflammatory stranding right anterior abdominal wall  3 mm pulmonary nodule left lower lobe  If patient is at high risk for malignancy, recommend short-term follow-up in 12 months   Workstation performed: [id number]

## 2022-12-02 ENCOUNTER — OFFICE VISIT (OUTPATIENT)
Dept: URGENT CARE | Facility: CLINIC | Age: 47
End: 2022-12-02

## 2022-12-02 VITALS
TEMPERATURE: 97 F | BODY MASS INDEX: 23.92 KG/M2 | HEIGHT: 62 IN | SYSTOLIC BLOOD PRESSURE: 133 MMHG | WEIGHT: 130 LBS | RESPIRATION RATE: 18 BRPM | DIASTOLIC BLOOD PRESSURE: 69 MMHG | OXYGEN SATURATION: 98 % | HEART RATE: 98 BPM

## 2022-12-02 DIAGNOSIS — J20.9 ACUTE BRONCHITIS, UNSPECIFIED ORGANISM: Primary | ICD-10-CM

## 2022-12-02 RX ORDER — ALBUTEROL SULFATE 90 UG/1
2 AEROSOL, METERED RESPIRATORY (INHALATION) EVERY 6 HOURS PRN
Qty: 6.7 G | Refills: 0 | Status: SHIPPED | OUTPATIENT
Start: 2022-12-02

## 2022-12-02 RX ORDER — PREDNISONE 20 MG/1
20 TABLET ORAL 2 TIMES DAILY WITH MEALS
Qty: 7 TABLET | Refills: 0 | Status: SHIPPED | OUTPATIENT
Start: 2022-12-02 | End: 2022-12-04

## 2022-12-02 NOTE — PROGRESS NOTES
[facility]        NAME: Mary Wooten is a 56 y o  female  : 1975    MRN: [id number]  DATE: 2022  TIME: 1:37 PM    Assessment and Plan   Acute bronchitis, unspecified organism [J20 9]  1  Acute bronchitis, unspecified organism  predniSONE 20 mg tablet    albuterol (Proventil HFA) 90 mcg/act inhaler        Acute bronchitis per clinical evaluation  Will prescribe a 5 day burst of steroid with p r n  Albuterol  Patient Instructions     Follow up with PCP in 3-5 days  Proceed to  ER if symptoms worsen  Chief Complaint     Chief Complaint   Patient presents with   • Cough     Pt reports of worsening cough and congestion x 6 days ago  History of Present Illness     61-year-old female presents today with about 1 week of flu-like symptoms including diaphoresis nasal congestion, postnasal drip, rhinorrhea, coughing, chest tightness associated with chest pain  Also reports abdominal pain which may be associated with her pancreatitis and other GI issues  Also reports dizziness and headaches  Reports that her son had similar symptoms prior to the onset of hers, but did not have an official diagnosis of influenza  Review of Systems   Review of Systems   Constitutional: Positive for diaphoresis  Negative for chills and fever  HENT: Positive for congestion, postnasal drip and rhinorrhea  Respiratory: Positive for cough, chest tightness and shortness of breath  Cardiovascular: Positive for chest pain  Gastrointestinal: Positive for abdominal pain  Neurological: Positive for dizziness and headaches       Current Medications       Current Outpatient Medications:   •  albuterol (Proventil HFA) 90 mcg/act inhaler, Inhale 2 puffs every 6 (six) hours as needed for wheezing or shortness of breath, Disp: 6 7 g, Rfl: 0  •  predniSONE 20 mg tablet, Take 1 tablet (20 mg total) by mouth 2 (two) times a day with meals for 2 days then 1 tablet in the morning for the next 3 days , Disp: 7 tablet, Rfl: 0  •  albuterol (PROVENTIL HFA,VENTOLIN HFA) 90 mcg/act inhaler, Inhale 2 puffs every 6 (six) hours as needed for wheezing or shortness of breath, Disp: 1 Inhaler, Rfl: 1  •  bisacodyl (DULCOLAX) 5 mg EC tablet, Take 2 tablets (10 mg total) by mouth once for 1 dose, Disp: 2 tablet, Rfl: 0  •  Dapagliflozin Propanediol 5 MG TABS, Take 1 tablet (5 mg total) by mouth daily, Disp: 90 tablet, Rfl: 1  •  dicyclomine (BENTYL) 10 mg capsule, Take 1 capsule (10 mg total) by mouth 4 (four) times a day (before meals and at bedtime), Disp: 30 capsule, Rfl: 1  •  escitalopram (LEXAPRO) 10 mg tablet, take 1 tablet by mouth once daily, Disp: 90 tablet, Rfl: 1  •  gabapentin (NEURONTIN) 800 mg tablet, Take 1 tablet (800 mg total) by mouth 3 (three) times a day, Disp: 90 tablet, Rfl: 5  •  metFORMIN (GLUCOPHAGE-XR) 500 mg 24 hr tablet, Take 1 tablet (500 mg total) by mouth 2 (two) times a day with meals, Disp: 180 tablet, Rfl: 1  •  Multiple Vitamin (DAILY VALUE MULTIVITAMIN) TABS, Take by mouth daily , Disp: , Rfl:   •  nicotine (NICODERM CQ) 14 mg/24hr TD 24 hr patch, Place 1 patch on the skin daily Do not start before October 23, 2022   (Patient not taking: No sig reported), Disp: 28 patch, Rfl: 0  •  ondansetron (ZOFRAN-ODT) 8 mg disintegrating tablet, Take 1 tablet (8 mg total) by mouth every 8 (eight) hours as needed for nausea or vomiting, Disp: 30 tablet, Rfl: 0  •  OneTouch Ultra test strip, Use 1 each 2 (two) times a day, Disp: 180 strip, Rfl: 3  •  polyethylene glycol (GOLYTELY) 4000 mL solution, Take 4,000 mL by mouth once for 1 dose, Disp: 4000 mL, Rfl: 0  •  ramipril (ALTACE) 2 5 mg capsule, take 1 capsule by mouth once daily, Disp: 90 capsule, Rfl: 0  •  repaglinide (PRANDIN) 2 mg tablet, Take 1 tablet (2 mg total) by mouth 3 (three) times a day before meals, Disp: 90 tablet, Rfl: 5    Current Allergies     Allergies as of 12/02/2022   • (No Known Allergies)            The following portions of the patient's history were reviewed and updated as appropriate: allergies, current medications, past family history, past medical history, past social history, past surgical history and problem list      Past Medical History:   Diagnosis Date   • Acute gastritis     [id number] RESOLVED   • Allergic    • Asthma    • Breast pain     [id number] RESOLVED   • COVID-19 virus infection 12/1/2021   • [facility] )     MELLITUS   • [facility]    • Viral gastroenteritis     [id number] RESOLVED       Past Surgical History:   Procedure Laterality Date   • HAND SURGERY     • SKIN BIOPSY         Family History   Problem Relation Age of Onset   • Hypertension Mother    • Breast cancer Family    • Colon cancer Family    • Diabetes Family         MELLITUS   • Lung cancer Family    • Mental illness Neg Hx    • Substance Abuse Neg Hx          Medications have been verified  Objective   /69   Pulse 98   Temp (!) 97 °F (36 1 °C)   Resp 18   Ht 5' 2" (1 575 m)   Wt 59 kg (130 lb)   SpO2 98%   BMI 23 78 kg/m²   No LMP recorded  Physical Exam     Physical Exam  Vitals and nursing note reviewed  Constitutional:       General: She is in acute distress  Appearance: Normal appearance  She is normal weight  She is not ill-appearing, toxic-appearing or diaphoretic  HENT:      Head: Normocephalic and atraumatic  Nose:      Comments: Inflamed nasal mucosa     Mouth/Throat:      Mouth: Mucous membranes are moist       Pharynx: No posterior oropharyngeal erythema  Eyes:      General:         Right eye: No discharge  Left eye: No discharge  Conjunctiva/sclera: Conjunctivae normal    Cardiovascular:      Rate and Rhythm: Normal rate and regular rhythm  Heart sounds: Normal heart sounds  Pulmonary:      Effort: No respiratory distress  Breath sounds: Normal breath sounds  No wheezing, rhonchi or rales        Comments: Deep respirations trigger coughing  Skin:     General: Skin is warm  Findings: No erythema  Neurological:      General: No focal deficit present  Mental Status: She is alert and oriented to person, place, and time  Psychiatric:         Mood and Affect: Mood normal          Behavior: Behavior normal          Thought Content:  Thought content normal          Judgment: Judgment normal

## 2022-12-06 ENCOUNTER — TELEPHONE (OUTPATIENT)
Dept: ENDOCRINOLOGY | Facility: CLINIC | Age: 47
End: 2022-12-06

## 2022-12-07 ENCOUNTER — OFFICE VISIT (OUTPATIENT)
Dept: ENDOCRINOLOGY | Facility: CLINIC | Age: 47
End: 2022-12-07

## 2022-12-07 ENCOUNTER — OFFICE VISIT (OUTPATIENT)
Dept: FAMILY MEDICINE CLINIC | Facility: CLINIC | Age: 47
End: 2022-12-07

## 2022-12-07 VITALS
SYSTOLIC BLOOD PRESSURE: 106 MMHG | HEIGHT: 62 IN | OXYGEN SATURATION: 96 % | WEIGHT: 139 LBS | BODY MASS INDEX: 25.58 KG/M2 | TEMPERATURE: 97.9 F | HEART RATE: 92 BPM | RESPIRATION RATE: 18 BRPM | DIASTOLIC BLOOD PRESSURE: 66 MMHG

## 2022-12-07 VITALS
BODY MASS INDEX: 25.4 KG/M2 | OXYGEN SATURATION: 96 % | DIASTOLIC BLOOD PRESSURE: 76 MMHG | SYSTOLIC BLOOD PRESSURE: 122 MMHG | WEIGHT: 138 LBS | HEART RATE: 86 BPM | TEMPERATURE: 98 F | HEIGHT: 62 IN

## 2022-12-07 DIAGNOSIS — K31.84 GASTROPARESIS DUE TO DM (HCC): ICD-10-CM

## 2022-12-07 DIAGNOSIS — E11.42 DIABETIC POLYNEUROPATHY ASSOCIATED WITH TYPE 2 DIABETES MELLITUS (HCC): ICD-10-CM

## 2022-12-07 DIAGNOSIS — E11.65 TYPE 2 DIABETES MELLITUS WITH HYPERGLYCEMIA, WITHOUT LONG-TERM CURRENT USE OF INSULIN (HCC): Primary | ICD-10-CM

## 2022-12-07 DIAGNOSIS — R68.89 FLU-LIKE SYMPTOMS: Primary | ICD-10-CM

## 2022-12-07 DIAGNOSIS — F32.A ANXIETY AND DEPRESSION: ICD-10-CM

## 2022-12-07 DIAGNOSIS — F41.9 ANXIETY AND DEPRESSION: ICD-10-CM

## 2022-12-07 DIAGNOSIS — F17.218 CIGARETTE NICOTINE DEPENDENCE WITH OTHER NICOTINE-INDUCED DISORDER: ICD-10-CM

## 2022-12-07 DIAGNOSIS — R05.3 CHRONIC COUGH: ICD-10-CM

## 2022-12-07 DIAGNOSIS — N76.0 ACUTE VAGINITIS: ICD-10-CM

## 2022-12-07 DIAGNOSIS — E11.43 GASTROPARESIS DUE TO DM (HCC): ICD-10-CM

## 2022-12-07 RX ORDER — ALBUTEROL SULFATE 2.5 MG/3ML
2.5 SOLUTION RESPIRATORY (INHALATION) EVERY 4 HOURS PRN
Qty: 100 ML | Refills: 0 | Status: SHIPPED | OUTPATIENT
Start: 2022-12-07

## 2022-12-07 RX ORDER — INSULIN GLARGINE 300 U/ML
20 INJECTION, SOLUTION SUBCUTANEOUS
Qty: 12 ML | Refills: 0 | Status: SHIPPED | OUTPATIENT
Start: 2022-12-07

## 2022-12-07 RX ORDER — FLUCONAZOLE 150 MG/1
TABLET ORAL
Qty: 2 TABLET | Refills: 0 | Status: SHIPPED | OUTPATIENT
Start: 2022-12-07 | End: 2022-12-08

## 2022-12-07 NOTE — LETTER
December 7, 2022     Patient: Elva Holbrook  YOB: 1975  Date of Visit: 12/7/2022      To Whom it May Concern:    Sunil Fink is under my professional care  Ekta Bal was seen in my office on 12/7/2022  Ekta Phelan may return to work on 12/19/21  She reports severe lethargy from a recent onset influenza and poorly controlled hyperglycemia  If you have any questions or concerns, please don't hesitate to call           Sincerely,          Colleen Moran MD        CC: No Recipients

## 2022-12-07 NOTE — PROGRESS NOTES
Assessment/Plan:    Will start albuterol nebulizer  Continue symptomatic treatment  PFT ordered when recovered, suspect COPD given smoking hsitory  Will complete forms for FMLA to return 12/27  1  Flu-like symptoms  -     albuterol (2 5 mg/3 mL) 0 083 % nebulizer solution; Take 3 mL (2 5 mg total) by nebulization every 4 (four) hours as needed for wheezing or shortness of breath    2  Acute vaginitis  -     fluconazole (DIFLUCAN) 150 mg tablet; 1 tab PO x1 dose, may repeat in 3 days if still symptomatic    3  Chronic cough  -     Complete PFT with post bronchodilator; Future    4  Cigarette nicotine dependence with other nicotine-induced disorder  -     Complete PFT with post bronchodilator; Future    5  Diabetic polyneuropathy associated with type 2 diabetes mellitus ([facility] )  Assessment & Plan:  Management per endo, starting insulin    Lab Results   Component Value Date    HGBA1C 10 2 (H) 10/20/2022         6  Anxiety and depression  Assessment & Plan:  stable              There are no Patient Instructions on file for this visit  Return if symptoms worsen or fail to improve  Subjective:      Patient ID: Rosa Spivey is a 56 y o  female  Chief Complaint   Patient presents with   • Follow-up     Diabetic f/u  [facility]       She has been sick with flu like symptoms  Was seen at urgent care and started on Prednisone  Still coughing with breathing difficulties  Saw endo, sugars have been running very high, has blurred vision  Was started on CGM and insulin  Requesting some time off to recover from all of this, has been sick a lot over the past few months  The following portions of the patient's history were reviewed and updated as appropriate: allergies, current medications, past family history, past medical history, past social history, past surgical history and problem list     Review of Systems   Constitutional: Positive for fatigue  Negative for chills and fever     HENT: Positive for congestion  Negative for ear pain, postnasal drip, rhinorrhea, sinus pressure and sore throat  Respiratory: Positive for cough  Negative for shortness of breath and wheezing  Cardiovascular: Negative for chest pain  Gastrointestinal: Negative for abdominal pain, diarrhea, nausea and vomiting  Musculoskeletal: Negative for arthralgias  Skin: Negative for rash  Neurological: Negative for headaches           Current Outpatient Medications   Medication Sig Dispense Refill   • albuterol (2 5 mg/3 mL) 0 083 % nebulizer solution Take 3 mL (2 5 mg total) by nebulization every 4 (four) hours as needed for wheezing or shortness of breath 100 mL 0   • albuterol (Proventil HFA) 90 mcg/act inhaler Inhale 2 puffs every 6 (six) hours as needed for wheezing or shortness of breath 6 7 g 0   • bisacodyl (DULCOLAX) 5 mg EC tablet Take 2 tablets (10 mg total) by mouth once for 1 dose 2 tablet 0   • Dapagliflozin Propanediol 5 MG TABS Take 1 tablet (5 mg total) by mouth daily 90 tablet 1   • dicyclomine (BENTYL) 10 mg capsule Take 1 capsule (10 mg total) by mouth 4 (four) times a day (before meals and at bedtime) 30 capsule 1   • escitalopram (LEXAPRO) 10 mg tablet take 1 tablet by mouth once daily 90 tablet 1   • fluconazole (DIFLUCAN) 150 mg tablet 1 tab PO x1 dose, may repeat in 3 days if still symptomatic 2 tablet 0   • gabapentin (NEURONTIN) 800 mg tablet Take 1 tablet (800 mg total) by mouth 3 (three) times a day 90 tablet 5   • insulin glargine (Toujeo Max SoloStar) 300 units/mL CONCENTRATED U-300 injection pen (2-unit dial) Inject 20 Units under the skin daily at bedtime 12 mL 0   • metFORMIN (GLUCOPHAGE-XR) 500 mg 24 hr tablet Take 1 tablet (500 mg total) by mouth 2 (two) times a day with meals 180 tablet 1   • Multiple Vitamin (DAILY VALUE MULTIVITAMIN) TABS Take by mouth daily      • nicotine (NICODERM CQ) 14 mg/24hr TD 24 hr patch Place 1 patch on the skin daily Do not start before October 23, 2022  28 patch 0 • ondansetron (ZOFRAN-ODT) 8 mg disintegrating tablet Take 1 tablet (8 mg total) by mouth every 8 (eight) hours as needed for nausea or vomiting 30 tablet 0   • OneTouch Ultra test strip Use 1 each 2 (two) times a day 180 strip 3   • polyethylene glycol (GOLYTELY) 4000 mL solution Take 4,000 mL by mouth once for 1 dose 4000 mL 0   • ramipril (ALTACE) 2 5 mg capsule take 1 capsule by mouth once daily 90 capsule 0   • repaglinide (PRANDIN) 2 mg tablet Take 1 tablet (2 mg total) by mouth 3 (three) times a day before meals 90 tablet 5     No current facility-administered medications for this visit  Objective:    /66   Pulse 92   Temp 97 9 °F (36 6 °C)   Resp 18   Ht 5' 2" (1 575 m)   Wt 63 kg (139 lb)   LMP  (LMP Unknown)   SpO2 96%   BMI 25 42 kg/m²        Physical Exam  Vitals and nursing note reviewed  Constitutional:       General: She is not in acute distress  Appearance: Normal appearance  She is well-developed  She is ill-appearing  HENT:      Nose: Nose normal       Mouth/Throat:      Pharynx: Oropharynx is clear  Eyes:      Conjunctiva/sclera: Conjunctivae normal    Cardiovascular:      Rate and Rhythm: Normal rate and regular rhythm  Pulses: Normal pulses  Heart sounds: Normal heart sounds  No murmur heard  Pulmonary:      Effort: Pulmonary effort is normal       Breath sounds: Wheezing and rhonchi present  Skin:     General: Skin is warm and dry  Neurological:      Mental Status: She is alert     Psychiatric:         Mood and Affect: Mood normal          Behavior: Behavior normal                 Goodman Salt, CRNP

## 2022-12-07 NOTE — PROGRESS NOTES
Follow-up Patient Progress Note      CC: type 2 diabetes     History of Present Illness:   56 yr female with type 2 diabetes for 12 yrs, hx of GDM in 2005, gastroparesis, neuropathy, vitamin D deficiency, asthma  Last visit was 10/26/2022  Since last visit she continues to have some significant personal stressors and has had significant hyperglycemia      Home blood glucose monitoring: checks 1/day and notes glucose in 170-250 mg/dL      Current meds:  Dapagliflozin 5 mg q day  Metformin 500 mg twice a day  Prandin 2mg po tidac     Opthamology: yes,   Podiatry:   vaccination:   Dental:  Pancreatitis: yes     Ace/ARB: ramipril  Statin: no  Thyroid issues: no       Patient Active Problem List   Diagnosis   • Adverse reaction to statin medication   • Allergic asthma, mild intermittent, uncomplicated   • Anxiety and depression   • Diabetes mellitus with polyneuropathy (HCC)   • Burning sensation   • Fatigue   • Left breast mass   • Low back pain   • Nicotine dependence   • Overweight (BMI 25 0-29  9)   • AC joint arthropathy   • Plantar fasciitis   • Onychomycosis   • Globus sensation   • Gastroparesis due to DM [facility])   • Primary hypertension   • Abdominal pain     Past Medical History:   Diagnosis Date   • Acute gastritis     [id number] RESOLVED   • Allergic    • Asthma    • Breast pain     [id number] RESOLVED   • COVID-19 virus infection 12/1/2021   • Diabetes ([facility] [address] )     MELLITUS   • [facility] )    • Viral gastroenteritis     [id number] RESOLVED      Past Surgical History:   Procedure Laterality Date   • HAND SURGERY     • SKIN BIOPSY        Family History   Problem Relation Age of Onset   • Hypertension Mother    • Breast cancer Family    • Colon cancer Family    • Diabetes Family         MELLITUS   • Lung cancer Family    • Mental illness Neg Hx    • Substance Abuse Neg Hx      Social History     Tobacco Use   • Smoking status: Every Day     Packs/day: 0 50     Types: Cigarettes   • Smokeless tobacco: Never   Substance Use Topics   • Alcohol use: Not Currently     Comment: 2-3 drinks      No Known Allergies      Current Outpatient Medications:   •  albuterol (Proventil HFA) 90 mcg/act inhaler, Inhale 2 puffs every 6 (six) hours as needed for wheezing or shortness of breath, Disp: 6 7 g, Rfl: 0  •  Dapagliflozin Propanediol 5 MG TABS, Take 1 tablet (5 mg total) by mouth daily, Disp: 90 tablet, Rfl: 1  •  dicyclomine (BENTYL) 10 mg capsule, Take 1 capsule (10 mg total) by mouth 4 (four) times a day (before meals and at bedtime), Disp: 30 capsule, Rfl: 1  •  escitalopram (LEXAPRO) 10 mg tablet, take 1 tablet by mouth once daily, Disp: 90 tablet, Rfl: 1  •  gabapentin (NEURONTIN) 800 mg tablet, Take 1 tablet (800 mg total) by mouth 3 (three) times a day, Disp: 90 tablet, Rfl: 5  •  metFORMIN (GLUCOPHAGE-XR) 500 mg 24 hr tablet, Take 1 tablet (500 mg total) by mouth 2 (two) times a day with meals, Disp: 180 tablet, Rfl: 1  •  Multiple Vitamin (DAILY VALUE MULTIVITAMIN) TABS, Take by mouth daily , Disp: , Rfl:   •  ondansetron (ZOFRAN-ODT) 8 mg disintegrating tablet, Take 1 tablet (8 mg total) by mouth every 8 (eight) hours as needed for nausea or vomiting, Disp: 30 tablet, Rfl: 0  •  OneTouch Ultra test strip, Use 1 each 2 (two) times a day, Disp: 180 strip, Rfl: 3  •  ramipril (ALTACE) 2 5 mg capsule, take 1 capsule by mouth once daily, Disp: 90 capsule, Rfl: 0  •  repaglinide (PRANDIN) 2 mg tablet, Take 1 tablet (2 mg total) by mouth 3 (three) times a day before meals, Disp: 90 tablet, Rfl: 5  •  albuterol (PROVENTIL HFA,VENTOLIN HFA) 90 mcg/act inhaler, Inhale 2 puffs every 6 (six) hours as needed for wheezing or shortness of breath (Patient not taking: Reported on 12/7/2022), Disp: 1 Inhaler, Rfl: 1  •  bisacodyl (DULCOLAX) 5 mg EC tablet, Take 2 tablets (10 mg total) by mouth once for 1 dose, Disp: 2 tablet, Rfl: 0  •  nicotine (NICODERM CQ) 14 mg/24hr TD 24 hr patch, Place 1 patch on the skin daily Do not start before October 23, 2022  (Patient not taking: Reported on 10/25/2022), Disp: 28 patch, Rfl: 0  •  polyethylene glycol (GOLYTELY) 4000 mL solution, Take 4,000 mL by mouth once for 1 dose, Disp: 4000 mL, Rfl: 0    Review of Systems   Constitutional: Positive for fatigue  HENT: Negative  Eyes: Negative  Respiratory: Negative  Cardiovascular: Negative  Gastrointestinal: Negative  Endocrine: Negative  Musculoskeletal: Negative  Skin: Negative  Allergic/Immunologic: Negative  Neurological: Negative  Hematological: Negative  Psychiatric/Behavioral: Negative  Physical Exam:  Body mass index is 25 24 kg/m²  /76 (BP Location: Left arm, Patient Position: Sitting, Cuff Size: Large)   Pulse 86   Temp 98 °F (36 7 °C) (Tympanic)   Ht 5' 2" (1 575 m)   Wt 62 6 kg (138 lb)   SpO2 96%   BMI 25 24 kg/m²    Vitals:    12/07/22 1324   Weight: 62 6 kg (138 lb)        Physical Exam  Constitutional:       Appearance: She is well-developed  HENT:      Head: Normocephalic  Eyes:      Pupils: Pupils are equal, round, and reactive to light  Neck:      Thyroid: No thyromegaly  Cardiovascular:      Rate and Rhythm: Normal rate  Heart sounds: Normal heart sounds  Pulmonary:      Effort: Pulmonary effort is normal       Breath sounds: Normal breath sounds  Abdominal:      General: Bowel sounds are normal       Palpations: Abdomen is soft  Musculoskeletal:         General: No deformity  Cervical back: Normal range of motion  Skin:     Capillary Refill: Capillary refill takes less than 2 seconds  Coloration: Skin is not pale  Findings: No rash  Neurological:      Mental Status: She is alert and oriented to person, place, and time           Labs:   Lab Results   Component Value Date    HGBA1C 10 2 (H) 10/20/2022       Lab Results   Component Value Date    [id number] 0 79 06/07/2017       Lab Results   Component Value Date    CREATININE 0 89 10/22/2022 CREATININE 0 94 10/21/2022    CREATININE 0 70 10/20/2022    BUN 9 10/22/2022     06/07/2017    K 4 2 10/22/2022     10/22/2022    CO2 21 10/22/2022     eGFR   Date Value Ref Range Status   10/22/2022 77 ml/min/1 73sq m Final       Lab Results   Component Value Date    ALT 14 10/22/2022    AST 13 10/22/2022    ALKPHOS 77 10/22/2022    BILITOT 0 6 06/07/2017       Lab Results   Component Value Date    CHOLESTEROL 140 01/24/2018     Lab Results   Component Value Date    HDL 55 01/24/2018    HDL 56 02/17/2017     Lab Results   Component Value Date    TRIG 301 (H) 10/22/2022    TRIG 52 01/24/2018    TRIG 72 02/17/2017     Lab Results   Component Value Date    [facility] 107 02/17/2017         Impression:  1  Type 2 diabetes mellitus with hyperglycemia, without long-term current use of insulin ([facility] )    2  Gastroparesis due to DM [facility])         Plan:    Maddie Kelley was seen today for follow-up  Diagnoses and all orders for this visit:    Type 2 diabetes mellitus with hyperglycemia, without long-term current use of insulin ([facility] [address] )  She remains uncontrolled with A1c slightly worsening than before  Today we discussed options again and this time she agreed to initiate a basal insulin therapy to help improve her diabetes  This will be in addition to her current regimen of dapagliflozin, Prandin and metformin  Advised to send glucose logs in a few weeks  Advised to consider personal CGM  A sample Melvin 2 was provided  Follow-up in 3 months  -     Hemoglobin A1C; Future  -     Microalbumin / creatinine urine ratio; Future  -     insulin glargine (Toujeo Max SoloStar) 300 units/mL CONCENTRATED U-300 injection pen (2-unit dial); Inject 20 Units under the skin daily at bedtime  -     Ambulatory referral to Diabetic Education; Future    Gastroparesis due to DM ([facility] [address] )        I have spent 35 minutes with patient today in which greater than 50% of this time was spent in counseling/coordination of care        Discussed with the patient and all questioned fully answered  She will call me if any problems arise  Educated/ Counseled patient on diagnostic test results, prognosis, risk vs benefit of treatment options, importance of treatment compliance, healthy life and lifestyle choices        [address]

## 2022-12-08 NOTE — ASSESSMENT & PLAN NOTE
Management per endo, starting insulin    Lab Results   Component Value Date    HGBA1C 10 2 (H) 10/20/2022

## 2022-12-09 ENCOUNTER — TELEPHONE (OUTPATIENT)
Dept: FAMILY MEDICINE CLINIC | Facility: CLINIC | Age: 47
End: 2022-12-09

## 2022-12-15 ENCOUNTER — ANESTHESIA EVENT (OUTPATIENT)
Dept: GASTROENTEROLOGY | Facility: HOSPITAL | Age: 47
End: 2022-12-15

## 2022-12-15 ENCOUNTER — HOSPITAL ENCOUNTER (OUTPATIENT)
Dept: GASTROENTEROLOGY | Facility: HOSPITAL | Age: 47
Setting detail: OUTPATIENT SURGERY
End: 2022-12-15

## 2022-12-15 ENCOUNTER — ANESTHESIA (OUTPATIENT)
Dept: GASTROENTEROLOGY | Facility: HOSPITAL | Age: 47
End: 2022-12-15

## 2022-12-15 VITALS
HEIGHT: 62 IN | OXYGEN SATURATION: 100 % | RESPIRATION RATE: 16 BRPM | SYSTOLIC BLOOD PRESSURE: 116 MMHG | DIASTOLIC BLOOD PRESSURE: 65 MMHG | BODY MASS INDEX: 25.21 KG/M2 | TEMPERATURE: 97.2 F | HEART RATE: 72 BPM | WEIGHT: 137 LBS

## 2022-12-15 DIAGNOSIS — R10.9 ABDOMINAL PAIN: ICD-10-CM

## 2022-12-15 DIAGNOSIS — R10.13 DYSPEPSIA: ICD-10-CM

## 2022-12-15 DIAGNOSIS — Z12.11 SCREEN FOR COLON CANCER: ICD-10-CM

## 2022-12-15 LAB — GLUCOSE SERPL-MCNC: 145 MG/DL (ref 65–140)

## 2022-12-15 RX ORDER — LIDOCAINE HYDROCHLORIDE 20 MG/ML
INJECTION, SOLUTION EPIDURAL; INFILTRATION; INTRACAUDAL; PERINEURAL AS NEEDED
Status: DISCONTINUED | OUTPATIENT
Start: 2022-12-15 | End: 2022-12-15

## 2022-12-15 RX ORDER — SODIUM CHLORIDE, SODIUM LACTATE, POTASSIUM CHLORIDE, CALCIUM CHLORIDE 600; 310; 30; 20 MG/100ML; MG/100ML; MG/100ML; MG/100ML
INJECTION, SOLUTION INTRAVENOUS CONTINUOUS PRN
Status: DISCONTINUED | OUTPATIENT
Start: 2022-12-15 | End: 2022-12-15

## 2022-12-15 RX ORDER — PROPOFOL 10 MG/ML
INJECTION, EMULSION INTRAVENOUS AS NEEDED
Status: DISCONTINUED | OUTPATIENT
Start: 2022-12-15 | End: 2022-12-15

## 2022-12-15 RX ADMIN — PROPOFOL 30 MG: 10 INJECTION, EMULSION INTRAVENOUS at 08:08

## 2022-12-15 RX ADMIN — LIDOCAINE HYDROCHLORIDE 80 MG: 20 INJECTION, SOLUTION EPIDURAL; INFILTRATION; INTRACAUDAL; PERINEURAL at 08:05

## 2022-12-15 RX ADMIN — PROPOFOL 30 MG: 10 INJECTION, EMULSION INTRAVENOUS at 08:28

## 2022-12-15 RX ADMIN — PROPOFOL 140 MG: 10 INJECTION, EMULSION INTRAVENOUS at 08:07

## 2022-12-15 RX ADMIN — PROPOFOL 30 MG: 10 INJECTION, EMULSION INTRAVENOUS at 08:25

## 2022-12-15 RX ADMIN — PROPOFOL 30 MG: 10 INJECTION, EMULSION INTRAVENOUS at 08:13

## 2022-12-15 RX ADMIN — PROPOFOL 30 MG: 10 INJECTION, EMULSION INTRAVENOUS at 08:19

## 2022-12-15 RX ADMIN — SODIUM CHLORIDE, SODIUM LACTATE, POTASSIUM CHLORIDE, AND CALCIUM CHLORIDE: .6; .31; .03; .02 INJECTION, SOLUTION INTRAVENOUS at 07:55

## 2022-12-15 RX ADMIN — PROPOFOL 30 MG: 10 INJECTION, EMULSION INTRAVENOUS at 08:10

## 2022-12-15 RX ADMIN — PROPOFOL 30 MG: 10 INJECTION, EMULSION INTRAVENOUS at 08:22

## 2022-12-15 RX ADMIN — PROPOFOL 30 MG: 10 INJECTION, EMULSION INTRAVENOUS at 08:16

## 2022-12-15 RX ADMIN — PROPOFOL 30 MG: 10 INJECTION, EMULSION INTRAVENOUS at 08:31

## 2022-12-15 NOTE — ANESTHESIA PREPROCEDURE EVALUATION
Procedure:  EGD  COLONOSCOPY    Relevant Problems   CARDIO   (+) Primary hypertension      MUSCULOSKELETAL   (+) Low back pain      NEURO/PSYCH   (+) Anxiety and depression   (+) Diabetes mellitus with polyneuropathy (HCC)   (+) Gastroparesis due to DM (HCC)      PULMONARY   (+) Allergic asthma, mild intermittent, uncomplicated        Physical Exam    Airway    Mallampati score: II  TM Distance: >3 FB  Neck ROM: full     Dental   No notable dental hx     Cardiovascular  Cardiovascular exam normal    Pulmonary  Pulmonary exam normal     Other Findings        Anesthesia Plan  ASA Score- 2     Anesthesia Type- IV sedation with anesthesia with ASA Monitors  Additional Monitors:   Airway Plan:           Plan Factors-Exercise tolerance (METS): >4 METS  Chart reviewed  Imaging results reviewed  Existing labs reviewed  Patient summary reviewed  Patient is not a current smoker  Induction-     Postoperative Plan-     Informed Consent- Anesthetic plan and risks discussed with patient  I personally reviewed this patient with the CRNA  Discussed and agreed on the Anesthesia Plan with the CRNA  Graham Quinteros

## 2022-12-15 NOTE — ANESTHESIA POSTPROCEDURE EVALUATION
Post-Op Assessment Note    CV Status:  Stable    Pain management: adequate     Mental Status:  Alert and awake   Hydration Status:  Euvolemic   PONV Controlled:  Controlled   Airway Patency:  Patent      Post Op Vitals Reviewed: Yes      Staff: CRNA         No notable events documented      BP   102/67   Temp      Pulse  84   Resp   18   SpO2   99

## 2022-12-15 NOTE — H&P
History and Physical - [facility]  Sujatha Ridley 56 y o  female MRN: [id number]                  HPI: Sujatha Ridley is a 56y o  year old female who presents for EGD/colonoscopy      REVIEW OF SYSTEMS: Per the HPI, and otherwise unremarkable      Historical Information   Past Medical History:   Diagnosis Date   • Acute gastritis     [id number] RESOLVED   • Allergic    • Asthma    • Breast pain     [id number] RESOLVED   • COVID-19 virus infection 12/1/2021   • Diabetes ([facility] )     MELLITUS   • Diabetes mellitus ([facility] )    • Viral gastroenteritis     [id number] RESOLVED     Past Surgical History:   Procedure Laterality Date   • HAND SURGERY     • SKIN BIOPSY       Social History   Social History     Substance and Sexual Activity   Alcohol Use Not Currently    Comment: 2-3 drinks      Social History     Substance and Sexual Activity   Drug Use No     Social History     Tobacco Use   Smoking Status Every Day   • Packs/day: 0 50   • Types: Cigarettes   Smokeless Tobacco Never     Family History   Problem Relation Age of Onset   • Hypertension Mother    • Breast cancer Family    • Colon cancer Family    • Diabetes Family         MELLITUS   • Lung cancer Family    • Mental illness Neg Hx    • Substance Abuse Neg Hx        Meds/Allergies       Current Outpatient Medications:   •  albuterol (2 5 mg/3 mL) 0 083 % nebulizer solution  •  albuterol (Proventil HFA) 90 mcg/act inhaler  •  Dapagliflozin Propanediol 5 MG TABS  •  dicyclomine (BENTYL) 10 mg capsule  •  escitalopram (LEXAPRO) 10 mg tablet  •  gabapentin (NEURONTIN) 800 mg tablet  •  insulin glargine (Toujeo Max SoloStar) 300 units/mL CONCENTRATED U-300 injection pen (2-unit dial)  •  metFORMIN (GLUCOPHAGE-XR) 500 mg 24 hr tablet  •  Multiple Vitamin (DAILY VALUE MULTIVITAMIN) TABS  •  ondansetron (ZOFRAN-ODT) 8 mg disintegrating tablet  •  ramipril (ALTACE) 2 5 mg capsule  •  repaglinide (PRANDIN) 2 mg tablet  •  bisacodyl (DULCOLAX) 5 mg EC tablet  •  nicotine (NICODERM CQ) 14 mg/24hr TD 24 hr patch  •  OneTouch Ultra test strip  •  polyethylene glycol (GOLYTELY) 4000 mL solution    No Known Allergies    Objective     LMP 11/16/2022 (Approximate)       PHYSICAL EXAM    Gen: NAD  Head: NCAT  CV: RRR  CHEST: Clear  ABD: soft, NT/ND  EXT: no edema      ASSESSMENT/PLAN:  This is a 56y o  year old female here for egd/colon, and she is stable and optimized for her procedure

## 2022-12-16 ENCOUNTER — TELEPHONE (OUTPATIENT)
Dept: PLASTIC SURGERY | Facility: CLINIC | Age: 47
End: 2022-12-16

## 2022-12-20 ENCOUNTER — OFFICE VISIT (OUTPATIENT)
Dept: FAMILY MEDICINE CLINIC | Facility: CLINIC | Age: 47
End: 2022-12-20

## 2022-12-20 ENCOUNTER — TELEPHONE (OUTPATIENT)
Dept: GASTROENTEROLOGY | Facility: CLINIC | Age: 47
End: 2022-12-20

## 2022-12-20 VITALS
RESPIRATION RATE: 16 BRPM | HEIGHT: 62 IN | SYSTOLIC BLOOD PRESSURE: 122 MMHG | OXYGEN SATURATION: 97 % | WEIGHT: 140 LBS | BODY MASS INDEX: 25.76 KG/M2 | TEMPERATURE: 97.3 F | DIASTOLIC BLOOD PRESSURE: 70 MMHG | HEART RATE: 98 BPM

## 2022-12-20 DIAGNOSIS — F32.A ANXIETY AND DEPRESSION: ICD-10-CM

## 2022-12-20 DIAGNOSIS — J01.00 ACUTE NON-RECURRENT MAXILLARY SINUSITIS: Primary | ICD-10-CM

## 2022-12-20 DIAGNOSIS — E11.42 DIABETIC POLYNEUROPATHY ASSOCIATED WITH TYPE 2 DIABETES MELLITUS (HCC): ICD-10-CM

## 2022-12-20 DIAGNOSIS — F41.9 ANXIETY AND DEPRESSION: ICD-10-CM

## 2022-12-20 RX ORDER — ESCITALOPRAM OXALATE 20 MG/1
20 TABLET ORAL DAILY
Qty: 90 TABLET | Refills: 1 | Status: SHIPPED | OUTPATIENT
Start: 2022-12-20

## 2022-12-20 RX ORDER — AMOXICILLIN AND CLAVULANATE POTASSIUM 875; 125 MG/1; MG/1
1 TABLET, FILM COATED ORAL EVERY 12 HOURS SCHEDULED
Qty: 20 TABLET | Refills: 0 | Status: SHIPPED | OUTPATIENT
Start: 2022-12-20 | End: 2022-12-30

## 2022-12-20 NOTE — TELEPHONE ENCOUNTER
----- Message from Acey Bosworth, MD sent at 12/19/2022  4:40 PM EST -----  Biopsies from stomach and duodenum were negative for h pylori or celiac disease respectively  Single pre cancerous colon polyp, repeat colonoscopy in 7 years as originally discussed

## 2022-12-20 NOTE — PROGRESS NOTES
Assessment/Plan:    Continue with symptomatic treatment  Take antibiotics until finished  Time off from work extended d/t prolonged illness  1  Acute non-recurrent maxillary sinusitis  -     amoxicillin-clavulanate (AUGMENTIN) 875-125 mg per tablet; Take 1 tablet by mouth every 12 (twelve) hours for 10 days    2  Anxiety and depression  Assessment & Plan:  Not controlled  Will increase Lexapro to 20mg daily  Orders:  -     escitalopram (LEXAPRO) 20 mg tablet; Take 1 tablet (20 mg total) by mouth daily    3  Diabetic polyneuropathy associated with type 2 diabetes mellitus ([facility] [address] )  Assessment & Plan:  Has been having episodes of hypoglycemia since starting insulin  Will be dropping off Dexcom readings to endo tomorrow for review   Lab Results   Component Value Date    HGBA1C 10 2 (H) 10/20/2022                 Patient Instructions   2000 units of Vitamin D daily  Zinc supplement and extra Vitamin C when you are sick  No follow-ups on file  Subjective:      Patient ID: Delicia Borges is a 56 y o  female  Chief Complaint   Patient presents with   • Nasal Congestion     Onset: 3 days  Klcma   • Headache   • Sinus Problem   • Shortness of Breath   • Sore Throat   • Fatigue   • Cough       Following up on sinus issues  Has been sick on and off for the past several weeks  Has a lot of congestion, sinus pressure and cough  Breathing difficulties improved  The following portions of the patient's history were reviewed and updated as appropriate: allergies, current medications, past family history, past medical history, past social history, past surgical history and problem list     Review of Systems   Constitutional: Positive for fatigue  Negative for chills and fever  HENT: Positive for congestion, postnasal drip, sinus pressure and sore throat  Negative for ear pain and rhinorrhea  Respiratory: Positive for cough  Negative for shortness of breath and wheezing      Cardiovascular: Negative for chest pain  Gastrointestinal: Negative for abdominal pain, diarrhea, nausea and vomiting  Musculoskeletal: Negative for arthralgias  Skin: Negative for rash  Neurological: Positive for headaches           Current Outpatient Medications   Medication Sig Dispense Refill   • albuterol (2 5 mg/3 mL) 0 083 % nebulizer solution Take 3 mL (2 5 mg total) by nebulization every 4 (four) hours as needed for wheezing or shortness of breath 100 mL 0   • albuterol (Proventil HFA) 90 mcg/act inhaler Inhale 2 puffs every 6 (six) hours as needed for wheezing or shortness of breath 6 7 g 0   • amoxicillin-clavulanate (AUGMENTIN) 875-125 mg per tablet Take 1 tablet by mouth every 12 (twelve) hours for 10 days 20 tablet 0   • bisacodyl (DULCOLAX) 5 mg EC tablet Take 2 tablets (10 mg total) by mouth once for 1 dose 2 tablet 0   • Dapagliflozin Propanediol 5 MG TABS Take 1 tablet (5 mg total) by mouth daily 90 tablet 1   • dicyclomine (BENTYL) 10 mg capsule Take 1 capsule (10 mg total) by mouth 4 (four) times a day (before meals and at bedtime) 30 capsule 1   • escitalopram (LEXAPRO) 20 mg tablet Take 1 tablet (20 mg total) by mouth daily 90 tablet 1   • gabapentin (NEURONTIN) 800 mg tablet Take 1 tablet (800 mg total) by mouth 3 (three) times a day 90 tablet 5   • insulin glargine (Toujeo Max SoloStar) 300 units/mL CONCENTRATED U-300 injection pen (2-unit dial) Inject 20 Units under the skin daily at bedtime 12 mL 0   • metFORMIN (GLUCOPHAGE-XR) 500 mg 24 hr tablet Take 1 tablet (500 mg total) by mouth 2 (two) times a day with meals 180 tablet 1   • Multiple Vitamin (DAILY VALUE MULTIVITAMIN) TABS Take by mouth daily      • nicotine (NICODERM CQ) 14 mg/24hr TD 24 hr patch Place 1 patch on the skin daily Do not start before October 23, 2022  28 patch 0   • ondansetron (ZOFRAN-ODT) 8 mg disintegrating tablet Take 1 tablet (8 mg total) by mouth every 8 (eight) hours as needed for nausea or vomiting 30 tablet 0   • OneTouch Ultra test strip Use 1 each 2 (two) times a day 180 strip 3   • ramipril (ALTACE) 2 5 mg capsule take 1 capsule by mouth once daily 90 capsule 0   • repaglinide (PRANDIN) 2 mg tablet Take 1 tablet (2 mg total) by mouth 3 (three) times a day before meals 90 tablet 5   • Multiple Vitamin (DAILY VALUE MULTIVITAMIN) TABS Take by mouth daily      • ondansetron (ZOFRAN-ODT) 8 mg disintegrating tablet Take 1 tablet (8 mg total) by mouth every 8 (eight) hours as needed for nausea or vomiting 30 tablet 0   • polyethylene glycol (GOLYTELY) 4000 mL solution Take 4,000 mL by mouth once for 1 dose 4000 mL 0   • ramipril (ALTACE) 2 5 mg capsule take 1 capsule by mouth once daily 90 capsule 0     No current facility-administered medications for this visit  Objective:    /70   Pulse 98   Temp (!) 97 3 °F (36 3 °C)   Resp 16   Ht 5' 2" (1 575 m)   Wt 63 5 kg (140 lb)   SpO2 97%   BMI 25 61 kg/m²        Physical Exam  Vitals and nursing note reviewed  Constitutional:       General: She is not in acute distress  Appearance: Normal appearance  She is well-developed  She is ill-appearing  HENT:      Right Ear: Tympanic membrane normal       Left Ear: Tympanic membrane normal       Nose: Congestion present  Mouth/Throat:      Pharynx: Oropharynx is clear  Eyes:      Conjunctiva/sclera: Conjunctivae normal    Cardiovascular:      Rate and Rhythm: Normal rate and regular rhythm  Pulses: Normal pulses  Heart sounds: Normal heart sounds  No murmur heard  Pulmonary:      Effort: Pulmonary effort is normal       Breath sounds: Normal breath sounds  Lymphadenopathy:      Cervical: No cervical adenopathy  Skin:     General: Skin is warm and dry  Neurological:      Mental Status: She is alert     Psychiatric:         Mood and Affect: Mood normal          Behavior: Behavior normal                 ASA Wolff

## 2022-12-20 NOTE — LETTER
December 20, 2022     Patient: Edward Blanco  YOB: 1975  Date of Visit: 12/20/2022      To Whom it May Concern:    Luna Mary Ann is under my professional care  Kingvanessa Rowe was seen in my office on 12/20/2022  Her leave is being extended through 1/3/23  If you have any questions or concerns, please don't hesitate to call           Sincerely,          ASA Trevizo        CC: No Recipients

## 2022-12-22 NOTE — ASSESSMENT & PLAN NOTE
Has been having episodes of hypoglycemia since starting insulin    Will be dropping off Dexcom readings to endo tomorrow for review   Lab Results   Component Value Date    HGBA1C 10 2 (H) 10/20/2022

## 2023-01-03 ENCOUNTER — TELEPHONE (OUTPATIENT)
Dept: FAMILY MEDICINE CLINIC | Facility: CLINIC | Age: 48
End: 2023-01-03

## 2023-01-03 NOTE — TELEPHONE ENCOUNTER
ZAN    Patient needs a clearance note to return to work  Please also change the date on the paperwork to Andres 3,2023 and refax  Please advise

## 2023-01-04 NOTE — TELEPHONE ENCOUNTER
At her recent visit, we discussed returning today 1/4  There is already a work note in her chart for those dates

## 2023-01-04 NOTE — TELEPHONE ENCOUNTER
Patient is calling stating she left two messages yesterday and has not heard back  Needs to know when she can go back to work

## 2023-01-04 NOTE — TELEPHONE ENCOUNTER
Pt needs note adjusted to say she may return to full duty with no restrictions  Please refax when letter is ready

## 2023-01-09 LAB
LEFT EYE DIABETIC RETINOPATHY: NORMAL
RIGHT EYE DIABETIC RETINOPATHY: NORMAL

## 2023-01-09 PROCEDURE — 2023F DILAT RTA XM W/O RTNOPTHY: CPT | Performed by: NURSE PRACTITIONER

## 2023-02-16 NOTE — PROGRESS NOTES
FAMILY PRACTICE HEALTH MAINTENANCE OFFICE VISIT  [facility] - [address]    NAME: Rachel Allen  AGE: 38 y o  SEX: female  : 1975     DATE: 2019    Assessment and Plan     Problem List Items Addressed This Visit        Endocrine    Diabetes mellitus with neurological manifestations, uncontrolled ([address] )    Relevant Orders    IRIS Diabetic eye exam      Other Visit Diagnoses     Well adult exam    -  Primary    Need for vaccination        Relevant Orders    TDAP VACCINE GREATER THAN OR EQUAL TO 8YO IM (Completed)    BMI 25 0-25 9,adult                · Patient Counseling:   · Nutrition: Stressed importance of a well balanced diet, moderation of sodium/saturated fat, caloric balance and sufficient intake of fiber  · Exercise: Stressed the importance of regular exercise with a goal of 150 minutes per week  · Dental Health: Discussed daily flossing and brushing and regular dental visits     · Immunizations reviewed, adacel given  · Discussed benefits of screening, states up to date with [place] BMI Counseling: Body mass index is 25 6 kg/m²  Discussed with patient's BMI with her  The BMI is above average  BMI counseling and education was provided to the patient  Nutrition recommendations include reducing portion sizes and decreasing overall calorie intake  Exercise recommendations include moderate aerobic physical activity for 150 minutes/week  Return in about 6 months (around 2020)  Chief Complaint     Chief Complaint   Patient presents with    Physical Exam     Pt here for a CPE  History of Present Illness     Here for CPE  Is upset that her A1C is up and her endocrinologist changed some things, goes back end of the month  Has been getting frequent infections under her pannus, thinks this is due to new medication from endocrinologist and she will check back with them        Well Adult Physical   Patient here for a comprehensive physical exam       Diet and Physical Activity  Diet: well balanced diet  Exercise: daily      Depression Screen  PHQ-9 Depression Screening    PHQ-9:    Frequency of the following problems over the past two weeks:       Little interest or pleasure in doing things:  0 - not at all  Feeling down, depressed, or hopeless:  0 - not at all  PHQ-2 Score:  0          General Health  Hearing: Normal:  bilateral  Vision: no vision problems  Dental: regular dental visits        The following portions of the patient's history were reviewed and updated as appropriate: allergies, current medications, past family history, past medical history, past social history, past surgical history and problem list     Review of Systems     Review of Systems   Constitutional: Negative  HENT: Negative  Eyes: Negative  Respiratory: Negative  Cardiovascular: Negative  Gastrointestinal: Negative  Endocrine: Negative  Genitourinary: Negative  Musculoskeletal: Negative  Skin: Negative  Allergic/Immunologic: Negative  Neurological: Negative  Hematological: Negative  Psychiatric/Behavioral: Negative          Past Medical History     Past Medical History:   Diagnosis Date    Acute gastritis     [id number] RESOLVED    Breast pain     [id number] RESOLVED    Diabetes ([address] )     MELLITUS    Viral gastroenteritis     [id number] RESOLVED       Past Surgical History     Past Surgical History:   Procedure Laterality Date    HAND SURGERY      SKIN BIOPSY         Social History     Social History     Socioeconomic History    Marital status: /Civil Union     Spouse name: None    Number of children: None    Years of education: None    Highest education level: None   Occupational History    None   Social Needs    Financial resource strain: None    Food insecurity:     Worry: None     Inability: None    Transportation needs:     Medical: None     Non-medical: None   Tobacco Use    Smoking status: Current Every Day Smoker    Smokeless tobacco: Never Used   Substance and Sexual Activity    Alcohol use: Yes     Comment: 2-3 drinks     Drug use: No    Sexual activity: None   Lifestyle    Physical activity:     Days per week: None     Minutes per session: None    Stress: None   Relationships    Social connections:     Talks on phone: None     Gets together: None     Attends [employer] service: None     Active member of club or organization: None     Attends meetings of clubs or organizations: None     Relationship status: None    Intimate partner violence:     Fear of current or ex partner: None     Emotionally abused: None     Physically abused: None     Forced sexual activity: None   Other Topics Concern    None   Social History Narrative    None       Family History     Family History   Problem Relation Age of Onset    Hypertension Mother     Breast cancer Family     Colon cancer Family     Diabetes Family         MELLITUS    Lung cancer Family     Mental illness Neg Hx     Substance Abuse Neg Hx        Current Medications       Current Outpatient Medications:     ACCU-CHEK SOFTCLIX LANCETS lancets, by Does not apply route, Disp: , Rfl:     gabapentin (NEURONTIN) 800 mg tablet, take 1 tablet by mouth four times a day, Disp: 120 tablet, Rfl: 2    Insulin Pen Needle (NOVOFINE) 32G X 6 MM MISC, by Does not apply route, Disp: , Rfl:     INVOKAMET -1000 MG TB24, 2 (two) times a day  , Disp: , Rfl:     JANUVIA 100 MG tablet, Take 100 mg by mouth daily , Disp: , Rfl: 0    ONE TOUCH ULTRA TEST test strip, TEST twice a day, Disp: 100 each, Rfl: 1    ramipril (ALTACE) 2 5 mg capsule, take 1 capsule by mouth once daily, Disp: 30 capsule, Rfl: 0    repaglinide (PRANDIN) 2 mg tablet, Take 2 mg by mouth 3 (three) times a day before meals , Disp: , Rfl:     Liraglutide (VICTOZA) 18 MG/3ML SOPN, Inject under the skin daily, Disp: , Rfl:     ondansetron (ZOFRAN) 8 mg tablet, Take 1 tablet by mouth every 8 (eight) hours, Disp: , Rfl:      Allergies     No Known Allergies    Objective     /90 (BP Location: Right arm, Patient Position: Sitting, Cuff Size: Adult)   Pulse 80   Temp 98 2 °F (36 8 °C)   Resp 12   Ht 5' 3 75" (1 619 m)   Wt 67 1 kg (148 lb)   LMP 08/15/2019 (Exact Date)   BMI 25 60 kg/m²      Physical Exam   Constitutional: She is oriented to person, place, and time  She appears well-developed and well-nourished  No distress  HENT:   Head: Normocephalic and atraumatic  Right Ear: External ear normal    Left Ear: External ear normal    Mouth/Throat: Oropharynx is clear and moist    Eyes: EOM are normal    Neck: Normal range of motion  Neck supple  No thyromegaly present  Cardiovascular: Normal rate, regular rhythm, normal heart sounds and intact distal pulses  Exam reveals no gallop and no friction rub  Pulses are no weak pulses  No murmur heard  Pulses:       Dorsalis pedis pulses are 2+ on the right side, and 2+ on the left side  Pulmonary/Chest: Effort normal and breath sounds normal  She has no wheezes  She has no rales  Abdominal: Soft  Bowel sounds are normal  She exhibits no mass  There is no tenderness  There is no rebound  Musculoskeletal: Normal range of motion  She exhibits no deformity  Feet:   Right Foot:   Skin Integrity: Negative for ulcer, skin breakdown, erythema, warmth, callus or dry skin  Left Foot:   Skin Integrity: Negative for ulcer, skin breakdown, erythema, warmth, callus or dry skin  Lymphadenopathy:     She has no cervical adenopathy  Neurological: She is alert and oriented to person, place, and time  She has normal reflexes  She displays normal reflexes  No cranial nerve deficit  She exhibits normal muscle tone  Coordination normal    Skin: Skin is warm and dry  No rash noted  She is not diaphoretic  Psychiatric: She has a normal mood and affect  Her behavior is normal  Judgment and thought content normal        Patient's shoes and socks removed  Right Foot/Ankle   Right Foot Inspection  Skin Exam: skin normal and skin intact no dry skin, no warmth, no callus, no erythema, no maceration, no abnormal color, no pre-ulcer, no ulcer and no callus                          Toe Exam: ROM and strength within normal limits  Sensory       Monofilament testing: diminished  Vascular  Capillary refills: < 3 seconds  The right DP pulse is 2+  Left Foot/Ankle  Left Foot Inspection  Skin Exam: skin normal and skin intactno dry skin, no warmth, no erythema, no maceration, normal color, no pre-ulcer, no ulcer and no callus                         Toe Exam: ROM and strength within normal limits                   Sensory       Monofilament: diminished  Vascular  Capillary refills: < 3 seconds  The left DP pulse is 2+  Assign Risk Category:  No deformity present; Loss of protective sensation; No weak pulses       Risk: 2      Visual Acuity Screening    Right eye Left eye Both eyes   Without correction: 20/25 20/25 20/20   With correction:      Comments: Pt correctly identified the colors  aleah/MD SUNDAY [facility] Dressing: dry sterile dressing

## 2023-03-11 DIAGNOSIS — E11.65 TYPE 2 DIABETES MELLITUS WITH HYPERGLYCEMIA, WITHOUT LONG-TERM CURRENT USE OF INSULIN (HCC): ICD-10-CM

## 2023-03-13 RX ORDER — METFORMIN HYDROCHLORIDE 500 MG/1
TABLET, EXTENDED RELEASE ORAL
Qty: 180 TABLET | Refills: 1 | Status: SHIPPED | OUTPATIENT
Start: 2023-03-13

## 2023-03-14 ENCOUNTER — VBI (OUTPATIENT)
Dept: ADMINISTRATIVE | Facility: OTHER | Age: 48
End: 2023-03-14

## 2023-03-16 DIAGNOSIS — R20.2 PARESTHESIA: ICD-10-CM

## 2023-03-16 RX ORDER — GABAPENTIN 800 MG/1
TABLET ORAL
Qty: 90 TABLET | Refills: 5 | Status: SHIPPED | OUTPATIENT
Start: 2023-03-16

## 2023-06-15 ENCOUNTER — OFFICE VISIT (OUTPATIENT)
Dept: FAMILY MEDICINE CLINIC | Facility: CLINIC | Age: 48
End: 2023-06-15
Payer: COMMERCIAL

## 2023-06-15 VITALS
SYSTOLIC BLOOD PRESSURE: 118 MMHG | HEIGHT: 62 IN | DIASTOLIC BLOOD PRESSURE: 62 MMHG | WEIGHT: 146 LBS | TEMPERATURE: 96.6 F | BODY MASS INDEX: 26.87 KG/M2 | HEART RATE: 93 BPM | RESPIRATION RATE: 18 BRPM

## 2023-06-15 DIAGNOSIS — E11.42 DIABETIC POLYNEUROPATHY ASSOCIATED WITH TYPE 2 DIABETES MELLITUS (HCC): ICD-10-CM

## 2023-06-15 DIAGNOSIS — K29.00 OTHER ACUTE GASTRITIS WITHOUT HEMORRHAGE: Primary | ICD-10-CM

## 2023-06-15 DIAGNOSIS — F41.9 ANXIETY AND DEPRESSION: ICD-10-CM

## 2023-06-15 DIAGNOSIS — F32.A ANXIETY AND DEPRESSION: ICD-10-CM

## 2023-06-15 DIAGNOSIS — R10.10 UPPER ABDOMINAL PAIN: ICD-10-CM

## 2023-06-15 PROCEDURE — 99214 OFFICE O/P EST MOD 30 MIN: CPT | Performed by: NURSE PRACTITIONER

## 2023-06-15 RX ORDER — OMEPRAZOLE 40 MG/1
40 CAPSULE, DELAYED RELEASE ORAL DAILY
Qty: 30 CAPSULE | Refills: 1 | Status: SHIPPED | OUTPATIENT
Start: 2023-06-15

## 2023-06-15 NOTE — ASSESSMENT & PLAN NOTE
Management per endo, A1c drawn today   Lab Results   Component Value Date    HGBA1C 10 2 (H) 10/20/2022

## 2023-06-15 NOTE — PROGRESS NOTES
Assessment/Plan:    Labs drawn  To start Omeprazole in addition to Pepcid  Will f/u with results    1  Other acute gastritis without hemorrhage  -     omeprazole (PriLOSEC) 40 MG capsule; Take 1 capsule (40 mg total) by mouth daily    2  Diabetic polyneuropathy associated with type 2 diabetes mellitus ([facility] [address] )  Assessment & Plan:  Management per endo, A1c drawn today   Lab Results   Component Value Date    HGBA1C 10 2 (H) 10/20/2022       Orders:  -     CBC and differential  -     Hemoglobin A1C    3  Upper abdominal pain  -     CBC and differential  -     Comprehensive metabolic panel  -     Lipase    4  Anxiety and depression  Assessment & Plan:  stable              There are no Patient Instructions on file for this visit  Return if symptoms worsen or fail to improve  Subjective:      Patient ID: Ryan Kelly is a 53 y o  female  Chief Complaint   Patient presents with   • Abdominal Pain     Upper abdominal pain, hurts when push on it  Heart burn, burp after eating more then normal, and feeling sick started two weeks ago       Here today with upper abdominal pain  Ongoing x 2weeks  Concerned given hx of recent pancreatitis  Hurts with laying down or pushing on it  Pain is throbbing  Has had some increased stress  Increased heartburn and nausea, especially after eating  Normal BMS, however more loose  No fevers   Trying to hydrate  Taking Tums  The following portions of the patient's history were reviewed and updated as appropriate: allergies, current medications, past family history, past medical history, past social history, past surgical history and problem list     Review of Systems   Constitutional: Negative  Respiratory: Negative  Cardiovascular: Negative  Gastrointestinal: Positive for abdominal pain and nausea  Genitourinary: Negative  Neurological: Negative            Current Outpatient Medications   Medication Sig Dispense Refill   • albuterol (2 5 mg/3 mL) "0 083 % nebulizer solution Take 3 mL (2 5 mg total) by nebulization every 4 (four) hours as needed for wheezing or shortness of breath 100 mL 0   • albuterol (Proventil HFA) 90 mcg/act inhaler Inhale 2 puffs every 6 (six) hours as needed for wheezing or shortness of breath 6 7 g 0   • Dapagliflozin Propanediol 5 MG TABS Take 1 tablet (5 mg total) by mouth daily 90 tablet 1   • dicyclomine (BENTYL) 10 mg capsule Take 1 capsule (10 mg total) by mouth 4 (four) times a day (before meals and at bedtime) 30 capsule 1   • escitalopram (LEXAPRO) 20 mg tablet Take 1 tablet (20 mg total) by mouth daily 90 tablet 1   • gabapentin (NEURONTIN) 800 mg tablet take 1 tablet by mouth three times a day 90 tablet 5   • metFORMIN (GLUCOPHAGE-XR) 500 mg 24 hr tablet take 1 tablet by mouth twice a day with meals 180 tablet 1   • Multiple Vitamin (DAILY VALUE MULTIVITAMIN) TABS Take by mouth daily      • omeprazole (PriLOSEC) 40 MG capsule Take 1 capsule (40 mg total) by mouth daily 30 capsule 1   • ondansetron (ZOFRAN-ODT) 8 mg disintegrating tablet Take 1 tablet (8 mg total) by mouth every 8 (eight) hours as needed for nausea or vomiting 30 tablet 0   • OneTouch Ultra test strip Use 1 each 2 (two) times a day 180 strip 3   • ramipril (ALTACE) 2 5 mg capsule take 1 capsule by mouth once daily 90 capsule 0   • repaglinide (PRANDIN) 2 mg tablet Take 1 tablet (2 mg total) by mouth 3 (three) times a day before meals 90 tablet 5   • bisacodyl (DULCOLAX) 5 mg EC tablet Take 2 tablets (10 mg total) by mouth once for 1 dose (Patient not taking: Reported on 6/15/2023) 2 tablet 0     No current facility-administered medications for this visit  Objective:    /62   Pulse 93   Temp (!) 96 6 °F (35 9 °C) (Tympanic)   Resp 18   Ht 5' 2\" (1 575 m)   Wt 66 2 kg (146 lb)   LMP  (LMP Unknown)   BMI 26 70 kg/m²        Physical Exam  Vitals and nursing note reviewed  Constitutional:       Appearance: She is well-developed   " Cardiovascular:      Rate and Rhythm: Normal rate and regular rhythm  Pulses: no weak pulses          Dorsalis pedis pulses are 1+ on the right side and 1+ on the left side  Posterior tibial pulses are 1+ on the right side and 1+ on the left side  Heart sounds: Normal heart sounds  No murmur heard  Pulmonary:      Effort: Pulmonary effort is normal       Breath sounds: Normal breath sounds  Abdominal:      General: Bowel sounds are normal       Tenderness: There is abdominal tenderness (upper abdominal)  Feet:      Right foot:      Skin integrity: No ulcer, skin breakdown, erythema, warmth, callus or dry skin  Left foot:      Skin integrity: No ulcer, skin breakdown, erythema, warmth, callus or dry skin  Skin:     General: Skin is warm and dry  Neurological:      Mental Status: She is alert  Diabetic Foot Exam    Patient's shoes and socks removed  Right Foot/Ankle   Right Foot Inspection  Skin Exam: skin normal and skin intact  No dry skin, no warmth, no callus, no erythema, no maceration, no abnormal color, no pre-ulcer, no ulcer and no callus  Toe Exam: ROM and strength within normal limits  Sensory   Vibration: intact  Proprioception: intact  Monofilament testing: diminished    Vascular  Capillary refills: < 3 seconds  The right DP pulse is 1+  The right PT pulse is 1+  Left Foot/Ankle  Left Foot Inspection  Skin Exam: skin normal and skin intact  No dry skin, no warmth, no erythema, no maceration, normal color, no pre-ulcer, no ulcer and no callus  Toe Exam: ROM and strength within normal limits  Sensory   Vibration: intact  Proprioception: intact  Monofilament testing: diminished    Vascular  Capillary refills: < 3 seconds  The left DP pulse is 1+  The left PT pulse is 1+       Assign Risk Category  No deformity present  Loss of protective sensation  No weak pulses  Risk: 1         ASA Ruiz

## 2023-06-16 LAB
ALBUMIN SERPL-MCNC: 4 G/DL (ref 3.8–4.8)
ALBUMIN/GLOB SERPL: 1.7 {RATIO} (ref 1.2–2.2)
ALP SERPL-CCNC: 75 IU/L (ref 44–121)
ALT SERPL-CCNC: 9 IU/L (ref 0–32)
AST SERPL-CCNC: 12 IU/L (ref 0–40)
BASOPHILS # BLD AUTO: 0.1 X10E3/UL (ref 0–0.2)
BASOPHILS NFR BLD AUTO: 1 %
BILIRUB SERPL-MCNC: 0.3 MG/DL (ref 0–1.2)
BUN SERPL-MCNC: 8 MG/DL (ref 6–24)
BUN/CREAT SERPL: 11 (ref 9–23)
CALCIUM SERPL-MCNC: 9.1 MG/DL (ref 8.7–10.2)
CHLORIDE SERPL-SCNC: 96 MMOL/L (ref 96–106)
CO2 SERPL-SCNC: 22 MMOL/L (ref 20–29)
CREAT SERPL-MCNC: 0.71 MG/DL (ref 0.57–1)
EGFR: 105 ML/MIN/1.73
EOSINOPHIL # BLD AUTO: 0.1 X10E3/UL (ref 0–0.4)
EOSINOPHIL NFR BLD AUTO: 1 %
ERYTHROCYTE [DISTWIDTH] IN BLOOD BY AUTOMATED COUNT: 14.1 % (ref 11.7–15.4)
EST. AVERAGE GLUCOSE BLD GHB EST-MCNC: 289 MG/DL
GLOBULIN SER-MCNC: 2.4 G/DL (ref 1.5–4.5)
GLUCOSE SERPL-MCNC: 424 MG/DL (ref 70–99)
HBA1C MFR BLD: 11.7 % (ref 4.8–5.6)
HCT VFR BLD AUTO: 43.7 % (ref 34–46.6)
HGB BLD-MCNC: 14.6 G/DL (ref 11.1–15.9)
IMM GRANULOCYTES # BLD: 0.1 X10E3/UL (ref 0–0.1)
IMM GRANULOCYTES NFR BLD: 1 %
LIPASE SERPL-CCNC: 67 U/L (ref 14–72)
LYMPHOCYTES # BLD AUTO: 1.8 X10E3/UL (ref 0.7–3.1)
LYMPHOCYTES NFR BLD AUTO: 14 %
MCH RBC QN AUTO: 30.8 PG (ref 26.6–33)
MCHC RBC AUTO-ENTMCNC: 33.4 G/DL (ref 31.5–35.7)
MCV RBC AUTO: 92 FL (ref 79–97)
MONOCYTES # BLD AUTO: 0.6 X10E3/UL (ref 0.1–0.9)
MONOCYTES NFR BLD AUTO: 5 %
NEUTROPHILS # BLD AUTO: 9.8 X10E3/UL (ref 1.4–7)
NEUTROPHILS NFR BLD AUTO: 78 %
PLATELET # BLD AUTO: 219 X10E3/UL (ref 150–450)
POTASSIUM SERPL-SCNC: 4.4 MMOL/L (ref 3.5–5.2)
PROT SERPL-MCNC: 6.4 G/DL (ref 6–8.5)
RBC # BLD AUTO: 4.74 X10E6/UL (ref 3.77–5.28)
SODIUM SERPL-SCNC: 135 MMOL/L (ref 134–144)
WBC # BLD AUTO: 12.4 X10E3/UL (ref 3.4–10.8)

## 2023-06-19 DIAGNOSIS — E11.65 TYPE 2 DIABETES MELLITUS WITH HYPERGLYCEMIA, WITHOUT LONG-TERM CURRENT USE OF INSULIN (HCC): ICD-10-CM

## 2023-06-20 RX ORDER — DAPAGLIFLOZIN 5 MG/1
TABLET, FILM COATED ORAL
Qty: 90 TABLET | Refills: 1 | Status: SHIPPED | OUTPATIENT
Start: 2023-06-20

## 2023-07-03 ENCOUNTER — TELEPHONE (OUTPATIENT)
Dept: ENDOCRINOLOGY | Facility: CLINIC | Age: 48
End: 2023-07-03

## 2023-07-03 NOTE — TELEPHONE ENCOUNTER
Problem: Hyperglycemia (high blood sugar)    1. What is your reading? 300 or above         500 was the highest a couple of days ago. 2. Do you wear a device? (Melvin/Dexom) or did you fingerstick? Fingerstick  3. Ask patient to send their numbers for the last 3 days (72 hours) provider will review date. No Dexcom or Melvin  4. What symptoms are you having? Blurred vision, tiredness, shakiness, weakness. Patient missed follow up in March and never rescheduled. Put her in for Friday this week, 7/7. She noted that she has not been taking her insulin because she does not want to be on it. Any recommendations for her while she awaits her appt?

## 2023-07-07 ENCOUNTER — OFFICE VISIT (OUTPATIENT)
Dept: ENDOCRINOLOGY | Facility: CLINIC | Age: 48
End: 2023-07-07
Payer: COMMERCIAL

## 2023-07-07 VITALS
OXYGEN SATURATION: 98 % | SYSTOLIC BLOOD PRESSURE: 108 MMHG | WEIGHT: 146 LBS | DIASTOLIC BLOOD PRESSURE: 64 MMHG | BODY MASS INDEX: 26.87 KG/M2 | TEMPERATURE: 98.2 F | HEART RATE: 75 BPM | HEIGHT: 62 IN

## 2023-07-07 DIAGNOSIS — E55.9 VITAMIN D DEFICIENCY: ICD-10-CM

## 2023-07-07 DIAGNOSIS — E11.42 DIABETIC POLYNEUROPATHY ASSOCIATED WITH TYPE 2 DIABETES MELLITUS (HCC): ICD-10-CM

## 2023-07-07 DIAGNOSIS — E11.65 TYPE 2 DIABETES MELLITUS WITH HYPERGLYCEMIA, WITHOUT LONG-TERM CURRENT USE OF INSULIN (HCC): Primary | ICD-10-CM

## 2023-07-07 PROCEDURE — 99214 OFFICE O/P EST MOD 30 MIN: CPT | Performed by: INTERNAL MEDICINE

## 2023-07-07 RX ORDER — METFORMIN HYDROCHLORIDE 500 MG/1
500 TABLET, EXTENDED RELEASE ORAL 2 TIMES DAILY WITH MEALS
Qty: 180 TABLET | Refills: 1 | Status: SHIPPED | OUTPATIENT
Start: 2023-07-07

## 2023-07-07 RX ORDER — INSULIN LISPRO-AABC 100 [IU]/ML
6 INJECTION, SOLUTION SUBCUTANEOUS
Qty: 15 ML | Refills: 1 | Status: SHIPPED | OUTPATIENT
Start: 2023-07-07

## 2023-07-07 RX ORDER — INSULIN GLARGINE 300 U/ML
18 INJECTION, SOLUTION SUBCUTANEOUS
Qty: 15 ML | Refills: 0 | Status: SHIPPED | OUTPATIENT
Start: 2023-07-07

## 2023-07-07 RX ORDER — BLOOD-GLUCOSE,RECEIVER,CONT
1 EACH MISCELLANEOUS CONTINUOUS
Qty: 1 EACH | Refills: 0 | Status: SHIPPED | OUTPATIENT
Start: 2023-07-07

## 2023-07-07 RX ORDER — BLOOD-GLUCOSE SENSOR
1 EACH MISCELLANEOUS
Qty: 3 EACH | Refills: 5 | Status: SHIPPED | OUTPATIENT
Start: 2023-07-07

## 2023-07-07 NOTE — PROGRESS NOTES
Follow-up Patient Progress Note      CC: type 2 diabetes     History of Present Illness:   56 yr female with type 2 diabetes for 12 yrs, hx of GDM in 2005, gastroparesis, neuropathy, vitamin D deficiency, asthma. Last visit was 12/7/2022.     Since last visit, she gained 6 lbs. she stopped using insulin end of February 23. She continues to have some significant personal stressors and has had significant hyperglycemia.     Home blood glucose monitoring: checks 1/day and notes glucose in 170-250 mg/dL.     Current meds:  Dapagliflozin 5 mg q.day  Metformin 500 mg twice a day  Prandin 2mg po tidac    Opthamology: yes,   Podiatry:   vaccination:   Dental:  Pancreatitis: yes     Ace/ARB: ramipril  Statin: no  Thyroid issues: no    Patient Active Problem List   Diagnosis   • Adverse reaction to statin medication   • Allergic asthma, mild intermittent, uncomplicated   • Anxiety and depression   • Diabetes mellitus with polyneuropathy (HCC)   • Burning sensation   • Fatigue   • Left breast mass   • Low back pain   • Nicotine dependence   • Overweight (BMI 25.0-29. 9)   • AC joint arthropathy   • Plantar fasciitis   • Onychomycosis   • Globus sensation   • Gastroparesis due to DM [facility])   • Primary hypertension   • Abdominal pain     Past Medical History:   Diagnosis Date   • Acute gastritis     [id number] RESOLVED   • Allergic    • Asthma    • Breast pain     [id number] RESOLVED   • COVID-19 virus infection 12/1/2021   • Diabetes ([address])     MELLITUS   • Diabetes mellitus ([address])    • Viral gastroenteritis     [id number] RESOLVED      Past Surgical History:   Procedure Laterality Date   • HAND SURGERY     • SKIN BIOPSY        Family History   Problem Relation Age of Onset   • Hypertension Mother    • Breast cancer Family    • Colon cancer Family    • Diabetes Family         MELLITUS   • Lung cancer Family    • Mental illness Neg Hx    • Substance Abuse Neg Hx      Social History     Tobacco Use   • Smoking status: Every Day Packs/day: 0.50     Types: Cigarettes   • Smokeless tobacco: Never   Substance Use Topics   • Alcohol use: Not Currently     Comment: 2-3 drinks      No Known Allergies      Current Outpatient Medications:   •  albuterol (2.5 mg/3 mL) 0.083 % nebulizer solution, Take 3 mL (2.5 mg total) by nebulization every 4 (four) hours as needed for wheezing or shortness of breath, Disp: 100 mL, Rfl: 0  •  albuterol (Proventil HFA) 90 mcg/act inhaler, Inhale 2 puffs every 6 (six) hours as needed for wheezing or shortness of breath, Disp: 6.7 g, Rfl: 0  •  bisacodyl (DULCOLAX) 5 mg EC tablet, Take 2 tablets (10 mg total) by mouth once for 1 dose (Patient not taking: Reported on 6/15/2023), Disp: 2 tablet, Rfl: 0  •  dicyclomine (BENTYL) 10 mg capsule, Take 1 capsule (10 mg total) by mouth 4 (four) times a day (before meals and at bedtime), Disp: 30 capsule, Rfl: 1  •  escitalopram (LEXAPRO) 20 mg tablet, Take 1 tablet (20 mg total) by mouth daily, Disp: 90 tablet, Rfl: 1  •  Farxiga 5 MG TABS, take 1 tablet by mouth once daily, Disp: 90 tablet, Rfl: 1  •  gabapentin (NEURONTIN) 800 mg tablet, take 1 tablet by mouth three times a day, Disp: 90 tablet, Rfl: 5  •  metFORMIN (GLUCOPHAGE-XR) 500 mg 24 hr tablet, take 1 tablet by mouth twice a day with meals, Disp: 180 tablet, Rfl: 1  •  Multiple Vitamin (DAILY VALUE MULTIVITAMIN) TABS, Take by mouth daily , Disp: , Rfl:   •  omeprazole (PriLOSEC) 40 MG capsule, Take 1 capsule (40 mg total) by mouth daily, Disp: 30 capsule, Rfl: 1  •  ondansetron (ZOFRAN-ODT) 8 mg disintegrating tablet, Take 1 tablet (8 mg total) by mouth every 8 (eight) hours as needed for nausea or vomiting, Disp: 30 tablet, Rfl: 0  •  OneTouch Ultra test strip, Use 1 each 2 (two) times a day, Disp: 180 strip, Rfl: 3  •  ramipril (ALTACE) 2.5 mg capsule, take 1 capsule by mouth once daily, Disp: 90 capsule, Rfl: 0  •  repaglinide (PRANDIN) 2 mg tablet, Take 1 tablet (2 mg total) by mouth 3 (three) times a day before meals, Disp: 90 tablet, Rfl: 5    Review of Systems   HENT: Negative. Eyes: Negative. Respiratory: Negative. Cardiovascular: Negative. Gastrointestinal: Negative. Endocrine: Negative. Musculoskeletal: Negative. Skin: Negative. Allergic/Immunologic: Negative. Neurological: Negative. Hematological: Negative. Psychiatric/Behavioral: Negative. Physical Exam:  Body mass index is 26.7 kg/m². /64   Pulse 75   Temp 98.2 °F (36.8 °C)   Ht 5' 2" (1.575 m)   Wt 66.2 kg (146 lb)   LMP  (LMP Unknown)   SpO2 98%   BMI 26.70 kg/m²    Vitals:    07/07/23 1101   Weight: 66.2 kg (146 lb)        Physical Exam  Constitutional:       General: She is not in acute distress. Appearance: She is well-developed. She is not ill-appearing. HENT:      Head: Normocephalic and atraumatic. Nose: Nose normal.      Mouth/Throat:      Pharynx: Oropharynx is clear. Eyes:      Extraocular Movements: Extraocular movements intact. Conjunctiva/sclera: Conjunctivae normal.   Neck:      Thyroid: No thyromegaly. Cardiovascular:      Rate and Rhythm: Normal rate. Pulmonary:      Effort: Pulmonary effort is normal.   Musculoskeletal:         General: No deformity. Cervical back: Normal range of motion. Skin:     Capillary Refill: Capillary refill takes less than 2 seconds. Coloration: Skin is not pale. Findings: No rash. Neurological:      Mental Status: She is alert and oriented to person, place, and time.    Psychiatric:         Behavior: Behavior normal.         Labs:   Lab Results   Component Value Date    HGBA1C 11.7 (H) 06/15/2023       Lab Results   Component Value Date    [id number] 0.79 06/07/2017       Lab Results   Component Value Date    CREATININE 0.71 06/15/2023    CREATININE 0.89 10/22/2022    CREATININE 0.94 10/21/2022    BUN 8 06/15/2023     06/07/2017    K 4.4 06/15/2023    CL 96 06/15/2023    CO2 22 06/15/2023     eGFR   Date Value Ref Range Status   06/15/2023 105 >59 mL/min/1.73 Final   10/22/2022 77 ml/min/1.73sq m Final       Lab Results   Component Value Date    ALT 9 06/15/2023    AST 12 06/15/2023    ALKPHOS 77 10/22/2022    BILITOT 0.6 06/07/2017       Lab Results   Component Value Date    CHOLESTEROL 140 01/24/2018     Lab Results   Component Value Date    HDL 55 01/24/2018    HDL 56 02/17/2017     Lab Results   Component Value Date    TRIG 301 (H) 10/22/2022    TRIG 52 01/24/2018    TRIG 72 02/17/2017     Lab Results   Component Value Date    NONHDLC 107 02/17/2017         Impression:  1. Type 2 diabetes mellitus with hyperglycemia, without long-term current use of insulin ([address])    2. Diabetic polyneuropathy associated with type 2 diabetes mellitus ([address])    3. Vitamin D deficiency         Plan:    Car Rodrigues was seen today for diabetes mellitus. Diagnoses and all orders for this visit:    Type 2 diabetes mellitus with hyperglycemia, without long-term current use of insulin ([address] She is poorly controlled with A1c 11.7% partly due to nonadherence with insulin therapy. Today we discussed options and agreed to reinitiate basal and prandial insulin as listed below. She may continue metformin as listed below. We will stop both Prandin and dapagliflozin at this time. We may consider reinitiation later. She will benefit from a personal CGM given requirement for multiple fingersticks for glucose monitoring and for insulin injections daily. This is medically necessary for optimal care. Follow-up in 4 to 6 weeks. -     metFORMIN (GLUCOPHAGE-XR) 500 mg 24 hr tablet; Take 1 tablet (500 mg total) by mouth 2 (two) times a day with meals  -     Continuous Blood Gluc Sensor (Dexcom G7 Sensor); Use 1 Device every 10 days  -     Continuous Blood Gluc  (Dexcom G7 ) JAVAN; Use 1 each continuous  -     insulin glargine (Toujeo Max SoloStar) 300 units/mL CONCENTRATED U-300 injection pen (2-unit dial);  Inject 18 Units under the skin daily at bedtime  -     Insulin Lispro-aabc, 1 U Dial, (Lyumjev KwikPen) 100 UNIT/ML SOPN; Inject 6 Units under the skin 3 (three) times a day before meals  -     Hemoglobin A1C; Future  -     Albumin / creatinine urine ratio; Future    Diabetic polyneuropathy associated with type 2 diabetes mellitus ([address] Continue gabapentin. Vitamin D deficiency. Advised vitamin D3 5000 IU daily OTC. I have spent 32 minutes with patient today in which greater than 50% of this time was spent in counseling/coordination of care. Discussed with the patient and all questioned fully answered. She will call me if any problems arise. Educated/ Counseled patient on diagnostic test results, prognosis, risk vs benefit of treatment options, importance of treatment compliance, healthy life and lifestyle choices.       Janeth

## 2023-07-11 LAB
ALBUMIN/CREAT UR: 7 MG/G CREAT (ref 0–29)
CREAT UR-MCNC: 125.2 MG/DL
HBA1C MFR BLD: 11.8 % (ref 4.8–5.6)
MICROALBUMIN UR-MCNC: 8.6 UG/ML

## 2023-08-22 ENCOUNTER — OFFICE VISIT (OUTPATIENT)
Dept: ENDOCRINOLOGY | Facility: CLINIC | Age: 48
End: 2023-08-22
Payer: COMMERCIAL

## 2023-08-22 VITALS
DIASTOLIC BLOOD PRESSURE: 86 MMHG | WEIGHT: 163 LBS | OXYGEN SATURATION: 20 % | BODY MASS INDEX: 30 KG/M2 | SYSTOLIC BLOOD PRESSURE: 138 MMHG | HEIGHT: 62 IN | TEMPERATURE: 97.9 F | HEART RATE: 78 BPM | RESPIRATION RATE: 18 BRPM

## 2023-08-22 DIAGNOSIS — E11.43 GASTROPARESIS DUE TO DM (HCC): ICD-10-CM

## 2023-08-22 DIAGNOSIS — E11.42 DIABETIC POLYNEUROPATHY ASSOCIATED WITH TYPE 2 DIABETES MELLITUS (HCC): ICD-10-CM

## 2023-08-22 DIAGNOSIS — R53.83 OTHER FATIGUE: ICD-10-CM

## 2023-08-22 DIAGNOSIS — K31.84 GASTROPARESIS DUE TO DM (HCC): ICD-10-CM

## 2023-08-22 DIAGNOSIS — E11.65 TYPE 2 DIABETES MELLITUS WITH HYPERGLYCEMIA, WITH LONG-TERM CURRENT USE OF INSULIN (HCC): Primary | ICD-10-CM

## 2023-08-22 DIAGNOSIS — Z79.4 TYPE 2 DIABETES MELLITUS WITH HYPERGLYCEMIA, WITH LONG-TERM CURRENT USE OF INSULIN (HCC): Primary | ICD-10-CM

## 2023-08-22 PROCEDURE — 95251 CONT GLUC MNTR ANALYSIS I&R: CPT | Performed by: INTERNAL MEDICINE

## 2023-08-22 PROCEDURE — 99214 OFFICE O/P EST MOD 30 MIN: CPT | Performed by: INTERNAL MEDICINE

## 2023-08-22 RX ORDER — METFORMIN HYDROCHLORIDE 500 MG/1
1000 TABLET, EXTENDED RELEASE ORAL 2 TIMES DAILY WITH MEALS
Qty: 180 TABLET | Refills: 1 | Status: SHIPPED | OUTPATIENT
Start: 2023-08-22

## 2023-08-22 RX ORDER — INSULIN GLARGINE 300 U/ML
25 INJECTION, SOLUTION SUBCUTANEOUS
Qty: 15 ML | Refills: 0 | Status: SHIPPED | OUTPATIENT
Start: 2023-08-22

## 2023-08-22 RX ORDER — METFORMIN HYDROCHLORIDE 500 MG/1
500 TABLET, EXTENDED RELEASE ORAL 2 TIMES DAILY WITH MEALS
Qty: 180 TABLET | Refills: 1 | Status: SHIPPED | OUTPATIENT
Start: 2023-08-22 | End: 2023-08-22

## 2023-08-22 NOTE — PROGRESS NOTES
Follow-up Patient Progress Note      CC: type 2 diabetes     History of Present Illness:   56 yr female with type 2 diabetes for 12 yrs, hx of GDM in 2005, gastroparesis, neuropathy, vitamin D deficiency, asthma.  Last visit was 7/7/2023.     Since last visit, she gained 17 lbs.      CGM data review[de-identified]  Device: dexcomG7 Dates: 8/9/23-8/22/23  Usage: 100 %  Av glu: 197 mg/dL  SD:  mg/dL CV:   % GMI:   %  TIR: 39 %  TAR: 37+21 %  TBR: 2+1  %    Glycemic patters:  Fasting and postprandial hyperglycemia. Hypoglycemia: No     Current meds:  Metformin 500 mg twice a day  Toujeo 18 units nightly  Lyumjev 6 units 3 times daily AC     Opthamology: yes,   Podiatry:   vaccination:   Dental:  Pancreatitis: yes     Ace/ARB: ramipril  Statin: no  Thyroid issues: no       Patient Active Problem List   Diagnosis   • Adverse reaction to statin medication   • Allergic asthma, mild intermittent, uncomplicated   • Anxiety and depression   • Diabetes mellitus with polyneuropathy (HCC)   • Burning sensation   • Fatigue   • Left breast mass   • Low back pain   • Nicotine dependence   • Overweight (BMI 25.0-29. 9)   • AC joint arthropathy   • Plantar fasciitis   • Onychomycosis   • Globus sensation   • Gastroparesis due to DM [facility])   • Primary hypertension   • Abdominal pain     Past Medical History:   Diagnosis Date   • Acute gastritis     [id number] RESOLVED   • Allergic    • Asthma    • Breast pain     [id number] RESOLVED   • COVID-19 virus infection 12/1/2021   • Diabetes ([address])     MELLITUS   • Diabetes mellitus ([address])    • Viral gastroenteritis     [id number] RESOLVED      Past Surgical History:   Procedure Laterality Date   • HAND SURGERY     • SKIN BIOPSY        Family History   Problem Relation Age of Onset   • Hypertension Mother    • Breast cancer Family    • Colon cancer Family    • Diabetes Family         MELLITUS   • Lung cancer Family    • Mental illness Neg Hx    • Substance Abuse Neg Hx      Social History     Tobacco Use   • Smoking status: Every Day     Packs/day: 0.50     Types: Cigarettes   • Smokeless tobacco: Never   Substance Use Topics   • Alcohol use: Not Currently     Comment: 2-3 drinks      No Known Allergies      Current Outpatient Medications:   •  albuterol (2.5 mg/3 mL) 0.083 % nebulizer solution, Take 3 mL (2.5 mg total) by nebulization every 4 (four) hours as needed for wheezing or shortness of breath, Disp: 100 mL, Rfl: 0  •  albuterol (Proventil HFA) 90 mcg/act inhaler, Inhale 2 puffs every 6 (six) hours as needed for wheezing or shortness of breath, Disp: 6.7 g, Rfl: 0  •  Continuous Blood Gluc  (Dexcom G7 ) JAVAN, Use 1 each continuous, Disp: 1 each, Rfl: 0  •  Continuous Blood Gluc Sensor (Dexcom G7 Sensor), Use 1 Device every 10 days, Disp: 3 each, Rfl: 5  •  dicyclomine (BENTYL) 10 mg capsule, Take 1 capsule (10 mg total) by mouth 4 (four) times a day (before meals and at bedtime), Disp: 30 capsule, Rfl: 1  •  escitalopram (LEXAPRO) 20 mg tablet, Take 1 tablet (20 mg total) by mouth daily, Disp: 90 tablet, Rfl: 1  •  gabapentin (NEURONTIN) 800 mg tablet, take 1 tablet by mouth three times a day, Disp: 90 tablet, Rfl: 5  •  insulin glargine (Toujeo Max SoloStar) 300 units/mL CONCENTRATED U-300 injection pen (2-unit dial), Inject 18 Units under the skin daily at bedtime, Disp: 15 mL, Rfl: 0  •  Insulin Lispro-aabc, 1 U Dial, (Lyumjev KwikPen) 100 UNIT/ML SOPN, Inject 6 Units under the skin 3 (three) times a day before meals, Disp: 15 mL, Rfl: 1  •  metFORMIN (GLUCOPHAGE-XR) 500 mg 24 hr tablet, Take 1 tablet (500 mg total) by mouth 2 (two) times a day with meals, Disp: 180 tablet, Rfl: 1  •  Multiple Vitamin (DAILY VALUE MULTIVITAMIN) TABS, Take by mouth daily , Disp: , Rfl:   •  omeprazole (PriLOSEC) 40 MG capsule, Take 1 capsule (40 mg total) by mouth daily, Disp: 30 capsule, Rfl: 1  •  ramipril (ALTACE) 2.5 mg capsule, take 1 capsule by mouth once daily, Disp: 90 capsule, Rfl: 0  •  bisacodyl (DULCOLAX) 5 mg EC tablet, Take 2 tablets (10 mg total) by mouth once for 1 dose (Patient not taking: Reported on 6/15/2023), Disp: 2 tablet, Rfl: 0  •  ondansetron (ZOFRAN-ODT) 8 mg disintegrating tablet, Take 1 tablet (8 mg total) by mouth every 8 (eight) hours as needed for nausea or vomiting (Patient not taking: Reported on 8/22/2023), Disp: 30 tablet, Rfl: 0  •  OneTouch Ultra test strip, Use 1 each 2 (two) times a day (Patient not taking: Reported on 8/22/2023), Disp: 180 strip, Rfl: 3    Review of Systems   HENT: Negative. Eyes: Negative. Respiratory: Negative. Cardiovascular: Negative. Gastrointestinal: Negative. Endocrine: Negative. Musculoskeletal: Negative. Skin: Negative. Allergic/Immunologic: Negative. Neurological: Negative. Hematological: Negative. Psychiatric/Behavioral: Negative. Physical Exam:  Body mass index is 29.81 kg/m². /86 (BP Location: Left arm, Patient Position: Sitting, Cuff Size: Standard)   Pulse 78   Temp 97.9 °F (36.6 °C) (Tympanic)   Resp 18   Ht 5' 2" (1.575 m)   Wt 73.9 kg (163 lb)   SpO2 (!) 20%   BMI 29.81 kg/m²    Vitals:    08/22/23 0926   Weight: 73.9 kg (163 lb)        Physical Exam  Constitutional:       General: She is not in acute distress. Appearance: She is well-developed. She is not ill-appearing. HENT:      Head: Normocephalic and atraumatic. Nose: Nose normal.      Mouth/Throat:      Pharynx: Oropharynx is clear. Eyes:      Extraocular Movements: Extraocular movements intact. Conjunctiva/sclera: Conjunctivae normal.   Neck:      Thyroid: No thyromegaly. Cardiovascular:      Rate and Rhythm: Normal rate. Pulmonary:      Effort: Pulmonary effort is normal.   Musculoskeletal:         General: No deformity. Cervical back: Normal range of motion. Skin:     Capillary Refill: Capillary refill takes less than 2 seconds. Coloration: Skin is not pale. Findings: No rash.    Neurological: Mental Status: She is alert and oriented to person, place, and time. Psychiatric:         Behavior: Behavior normal.         Labs:   Lab Results   Component Value Date    HGBA1C 11.8 (H) 07/10/2023       Lab Results   Component Value Date    [id number] 0.79 06/07/2017       Lab Results   Component Value Date    CREATININE 0.71 06/15/2023    CREATININE 0.89 10/22/2022    CREATININE 0.94 10/21/2022    BUN 8 06/15/2023     06/07/2017    K 4.4 06/15/2023    CL 96 06/15/2023    CO2 22 06/15/2023     eGFR   Date Value Ref Range Status   06/15/2023 105 >59 mL/min/1.73 Final   10/22/2022 77 ml/min/1.73sq m Final       Lab Results   Component Value Date    ALT 9 06/15/2023    AST 12 06/15/2023    ALKPHOS 77 10/22/2022    BILITOT 0.6 06/07/2017       Lab Results   Component Value Date    CHOLESTEROL 140 01/24/2018     Lab Results   Component Value Date    HDL 55 01/24/2018    HDL 56 02/17/2017     Lab Results   Component Value Date    TRIG 301 (H) 10/22/2022    TRIG 52 01/24/2018    TRIG 72 02/17/2017     Lab Results   Component Value Date    NONHDLC 107 02/17/2017         Impression:  1. Type 2 diabetes mellitus with hyperglycemia, with long-term current use of insulin ([address])    2. Diabetic polyneuropathy associated with type 2 diabetes mellitus ([address])    3. Gastroparesis due to DM ([address])    4. Other fatigue         Plan:    Francisco Javier Vera was seen today for diabetes type 2. Diagnoses and all orders for this visit:    Type 2 diabetes mellitus with hyperglycemia, with long-term current use of insulin ([address]). She remains uncontrolled with an A1c of 11.8%. This is in spite of using insulin for the last few months with a weight gain of 17 pounds. Today we discussed options and I reiterated advised to maintain a carb consistent diet while using insulin to achieve euglycemia without significant weight gain.   To initiate a GLP-1 agonist in spite of background history of gastroparesis to help reduce appetite as patient finds this as one of the reasons weight gain. We will check underlying beta cell function. Medications were adjusted to metformin 1000 mg p.o. twice daily, Toujeo 25 units nightly, Ozempic 2.25 mg weekly and Lyumjev 6 units 3 times daily AC. Send personal CGM data in 6 weeks. Follow-up in 3 months. -     Ambulatory referral to Diabetic Education; Future  -     semaglutide, 0.25 or 0.5 mg/dose, (Ozempic, 0.25 or 0.5 MG/DOSE,) 2 mg/3 mL injection pen; Use 0.25mg weekly for 4 weeks then 0.5mg weekly  -     C-peptide; Future  -     Glutamic acid decarboxylase; Future  -     IA2 Autoantibodies; Future  -     Anti-islet cell antibody; Future  -     metFORMIN (GLUCOPHAGE-XR) 500 mg 24 hr tablet; Take 2 tablets (1,000 mg total) by mouth 2 (two) times a day with meals  -     insulin glargine (Toujeo Max SoloStar) 300 units/mL CONCENTRATED U-300 injection pen (2-unit dial); Inject 25 Units under the skin daily at bedtime    Diabetic polyneuropathy associated with type 2 diabetes mellitus ([address]). Advise daily exercise. Gastroparesis due to DM ([address]). I hope that achieving better glycemic control will help improve gastroparesis. Advise daily activity. Other fatigue    Other orders  -     Discontinue: metFORMIN (GLUCOPHAGE-XR) 500 mg 24 hr tablet; Take 1 tablet (500 mg total) by mouth 2 (two) times a day with meals        I have spent 35 minutes with patient today in which greater than 50% of this time was spent in counseling/coordination of care. Discussed with the patient and all questioned fully answered. She will call me if any problems arise. Educated/ Counseled patient on diagnostic test results, prognosis, risk vs benefit of treatment options, importance of treatment compliance, healthy life and lifestyle choices.       [facility]

## 2023-08-22 NOTE — LETTER
August 22, 2023     Patient: Francisco Elaine  YOB: 1975  Date of Visit: 8/22/2023      To Whom it May Concern:    Mar Andrews is under my professional care. Higinio Connor was seen in my office on 8/22/2023. Higinio Connor may return to work on 8/22/23 . She has Type 2 diabetes with Gastroparesis that is associated with significant burping as a symptom. If you have any questions or concerns, please don't hesitate to call.          Sincerely,          MD Janeth        CC: No Recipients

## 2023-08-29 DIAGNOSIS — F32.A ANXIETY AND DEPRESSION: ICD-10-CM

## 2023-08-29 DIAGNOSIS — F41.9 ANXIETY AND DEPRESSION: ICD-10-CM

## 2023-08-29 RX ORDER — ESCITALOPRAM OXALATE 20 MG/1
20 TABLET ORAL DAILY
Qty: 90 TABLET | Refills: 0 | Status: SHIPPED | OUTPATIENT
Start: 2023-08-29

## 2023-08-30 ENCOUNTER — OFFICE VISIT (OUTPATIENT)
Dept: DIABETES SERVICES | Facility: CLINIC | Age: 48
End: 2023-08-30

## 2023-08-30 VITALS — BODY MASS INDEX: 30.54 KG/M2 | WEIGHT: 167 LBS

## 2023-08-30 DIAGNOSIS — E11.65 TYPE 2 DIABETES MELLITUS WITH HYPERGLYCEMIA, WITH LONG-TERM CURRENT USE OF INSULIN (HCC): Primary | ICD-10-CM

## 2023-08-30 DIAGNOSIS — Z79.4 TYPE 2 DIABETES MELLITUS WITH HYPERGLYCEMIA, WITH LONG-TERM CURRENT USE OF INSULIN (HCC): Primary | ICD-10-CM

## 2023-08-30 NOTE — PROGRESS NOTES
Medical Nutrition Therapy        Assessment    Visit Type: Initial visit  Chief complaint/Medical Diagnosis/reason for visit Type 2 Diabetes Mellitus with hyperglycemia with long term current use of insulin    HPI Cyn Sanches came in today for her initial Medical Nutrition Therapy (MNT) appointment. Cyn Sanches stated she is not happy with her weight gain since she started on insulin 7 weeks ago. Cyn Sanches stated her goal is to get off the insulin. Cyn Sanches also stated that she has maintained her weight at 135 lbs for the past 15 years, and does not want to weigh 167 lbs. , today's weight. Obtained a 24 hour food recall. Problems identified in food recall include patient has cut out most carbohydrates resulting in inconsistent carbohydrate intake, skipping breakfast most mornings resulting in going long periods of time without eating, and.unbalanced meals. Provided patient with a 1400 calorie balanced individualized meal plan to assist with consistency, balance and portion control. Encouraged the consumption of balanced meals at appropriate times. Advised patient to keep carbohydrate intake to 30grams per meal and 15 grams per snack to assist with glycemic control. My Plate, food models,. portion booklet and food labels were used to teach basic carbohydrate counting. Patient stated she will call at a later date if she desires additional MNT education. RD will remain available for further dietary questions/concerns. Ht Readings from Last 1 Encounters:   08/22/23 5' 2" (1.575 m)     Wt Readings from Last 3 Encounters:   08/30/23 75.8 kg (167 lb)   08/22/23 73.9 kg (163 lb)   07/07/23 66.2 kg (146 lb)     Weight Change: Yes gained 4 lbs since last encounter. Barriers to Learning: no barriers    Do you follow any special diet presently?: Yes - mostly cutting out carbohydrates.   Who shops: patient  Who cooks: patient    Food Log: Completed via the method of food recall    Breakfast:None  Morning Snack:None  Lunch:11-Noon - sugar snap peas, cucumbers, 2 oz cheese, 3 sl rolled up turkey breast, unsweetened ice tea  Afternoon Snack: 2:30PM - Two Good for you yogurt, 1/4 cup nuts  Dinner:6PM - beef or chicken, greens or salad; 3x/week small portion of rice, pasta or potato  Evening Snack:8PM - raw veggies, or celery + peanut butter, unsweetened ice tea  Beverages: water, hot tea, or unsweetened ice tea  Eating out/Take out:at times  Exercise walks 10-15 minutes 5x/week, and just started the weighted [employer] hoop - 15-20 minutes 5x/week    Calorie needs: 1400 calories  Carbs: 30g/meal, 15g/snack         Nutrition Diagnosis:  Food and nutrition related knowledge deficit  related to Lack of prior exposure to accurate nutrition related information as evidenced by [employer] inaccurate or incomplete information    Intervention: plate method, label reading, carbohydrate counting, increased protein intake, meal timing, weight/ BMI goals, meal planning, individualized meal plan, monitoring portion control and exercise guidelines     Treatment Goals: Patient will consume 3 meals a day, Patient will consume snacks and Patient will count carbohydrates    Monitoring and evaluation:    Term code indicator  FH 1.3.2 Food Intake Criteria: Consume 3 balanced meals/day at appropriate times. Term code indicator  FH 1.6.3 Carbohydrate Intake Criteria: 30 grams carbohydrate/meal and 15 grams carbohydrate/snack. Term code indicator  FH 1.3.2 Food Intake Criteria: Consume 2 balanced snacks/day at appropriate times. Materials Provided: CHO counting, individualized meal plan, portion booklet, 15/15 rule to treat hypoglycemia    Patient’s Response to Instruction:  Comprehensiongood  Motivationgood  Expected Compliancegood    Begin Time: 8:35AM  End Time: 9:35AM  Referring Provider: Dr Lorna De Souza    Thank you for coming to the [address] [place] [address]  for education today.   Please feel free to call with any questions or concerns.     Dina Bahena  [address] [place] [id number] [phone]

## 2023-10-06 ENCOUNTER — OFFICE VISIT (OUTPATIENT)
Dept: FAMILY MEDICINE CLINIC | Facility: CLINIC | Age: 48
End: 2023-10-06
Payer: COMMERCIAL

## 2023-10-06 VITALS
SYSTOLIC BLOOD PRESSURE: 120 MMHG | TEMPERATURE: 97.6 F | RESPIRATION RATE: 18 BRPM | BODY MASS INDEX: 30.18 KG/M2 | OXYGEN SATURATION: 94 % | HEART RATE: 97 BPM | DIASTOLIC BLOOD PRESSURE: 70 MMHG | HEIGHT: 62 IN | WEIGHT: 164 LBS

## 2023-10-06 DIAGNOSIS — E11.43 GASTROPARESIS DUE TO DM: ICD-10-CM

## 2023-10-06 DIAGNOSIS — F41.9 ANXIETY AND DEPRESSION: ICD-10-CM

## 2023-10-06 DIAGNOSIS — F32.A ANXIETY AND DEPRESSION: ICD-10-CM

## 2023-10-06 DIAGNOSIS — E55.9 VITAMIN D DEFICIENCY: ICD-10-CM

## 2023-10-06 DIAGNOSIS — R53.83 OTHER FATIGUE: Primary | ICD-10-CM

## 2023-10-06 DIAGNOSIS — E11.42 DIABETIC POLYNEUROPATHY ASSOCIATED WITH TYPE 2 DIABETES MELLITUS (HCC): ICD-10-CM

## 2023-10-06 DIAGNOSIS — K31.84 GASTROPARESIS DUE TO DM: ICD-10-CM

## 2023-10-06 PROCEDURE — 99214 OFFICE O/P EST MOD 30 MIN: CPT | Performed by: NURSE PRACTITIONER

## 2023-10-06 NOTE — PROGRESS NOTES
Assessment/Plan:    Will check labs as below. F/u with results      1. Other fatigue  -     TSH, 3rd generation with Free T4 reflex; Future  -     Vitamin B12; Future  -     CBC and differential; Future  -     Lipase; Future  -     Fe+TIBC+Mj; Future  -     TSH, 3rd generation with Free T4 reflex  -     Vitamin B12  -     CBC and differential  -     PSA Total (Reflex To Free)  -     Lipase  -     Fe+TIBC+Mj  -     Vitamin D 25 hydroxy; Future  -     Vitamin D 25 hydroxy    2. Gastroparesis due to DM   Assessment & Plan:  stable  Lab Results   Component Value Date    HGBA1C 11.8 (H) 07/10/2023       Orders:  -     TSH, 3rd generation with Free T4 reflex; Future  -     Vitamin B12; Future  -     CBC and differential; Future  -     Lipase; Future  -     Fe+TIBC+Mj; Future  -     TSH, 3rd generation with Free T4 reflex  -     Vitamin B12  -     CBC and differential  -     PSA Total (Reflex To Free)  -     Lipase  -     Fe+TIBC+Mj  -     Vitamin D 25 hydroxy; Future  -     Vitamin D 25 hydroxy    3. Diabetic polyneuropathy associated with type 2 diabetes mellitus ([address])  Assessment & Plan:  Management per endo    Lab Results   Component Value Date    HGBA1C 11.8 (H) 07/10/2023       Orders:  -     TSH, 3rd generation with Free T4 reflex; Future  -     Vitamin B12; Future  -     CBC and differential; Future  -     Lipase; Future  -     Fe+TIBC+Mj; Future  -     TSH, 3rd generation with Free T4 reflex  -     Vitamin B12  -     CBC and differential  -     PSA Total (Reflex To Free)  -     Lipase  -     Fe+TIBC+Mj  -     Hemoglobin A1C; Future  -     Hemoglobin A1C  -     Vitamin D 25 hydroxy; Future  -     Vitamin D 25 hydroxy    4. Vitamin D deficiency  -     Vitamin D 25 hydroxy; Future  -     Vitamin D 25 hydroxy    5. Anxiety and depression  Assessment & Plan:  stable            There are no Patient Instructions on file for this visit. Return if symptoms worsen or fail to improve.     Subjective:      Patient ID: Davey Talavera is a 53 y.o. female. Chief Complaint   Patient presents with   • Fatigue   • Generalized Body Aches   • Headache   • Nasal Congestion   • COVID-19     Tested twice this week both neg   • Abdominal Pain     Symptoms started last Saturday  rmklpn       Here today with complaints of fatigue. Ongoing for the past week. She has some abdominal discomfort as well, although has history of gastroparesis. No associated URI or viral symptoms. Feels like she could fall asleep any time. The following portions of the patient's history were reviewed and updated as appropriate: allergies, current medications, past family history, past medical history, past social history, past surgical history and problem list.    Review of Systems   Constitutional: Positive for fatigue. Negative for chills and fever. HENT: Negative for congestion, ear pain, postnasal drip, rhinorrhea, sinus pressure and sore throat. Respiratory: Negative for cough, shortness of breath and wheezing. Cardiovascular: Negative for chest pain. Gastrointestinal: Negative for abdominal pain, diarrhea, nausea and vomiting. Musculoskeletal: Positive for arthralgias. Skin: Negative for rash. Neurological: Positive for headaches.          Current Outpatient Medications   Medication Sig Dispense Refill   • albuterol (2.5 mg/3 mL) 0.083 % nebulizer solution Take 3 mL (2.5 mg total) by nebulization every 4 (four) hours as needed for wheezing or shortness of breath 100 mL 0   • albuterol (Proventil HFA) 90 mcg/act inhaler Inhale 2 puffs every 6 (six) hours as needed for wheezing or shortness of breath 6.7 g 0   • Continuous Blood Gluc  (Dexcom G7 ) JAVAN Use 1 each continuous 1 each 0   • Continuous Blood Gluc Sensor (Dexcom G7 Sensor) Use 1 Device every 10 days 3 each 5   • dicyclomine (BENTYL) 10 mg capsule Take 1 capsule (10 mg total) by mouth 4 (four) times a day (before meals and at bedtime) 30 capsule 1   • escitalopram (LEXAPRO) 20 mg tablet take 1 tablet by mouth once daily 90 tablet 0   • insulin glargine (Toujeo Max SoloStar) 300 units/mL CONCENTRATED U-300 injection pen (2-unit dial) Inject 25 Units under the skin daily at bedtime (Patient taking differently: Inject 25 Units under the skin daily at bedtime 8/30/23 Patient stated she is currently taking 20 units at bedtime. ) 15 mL 0   • metFORMIN (GLUCOPHAGE-XR) 500 mg 24 hr tablet Take 2 tablets (1,000 mg total) by mouth 2 (two) times a day with meals 180 tablet 1   • Multiple Vitamin (DAILY VALUE MULTIVITAMIN) TABS Take by mouth daily      • omeprazole (PriLOSEC) 40 MG capsule Take 1 capsule (40 mg total) by mouth daily 30 capsule 1   • ramipril (ALTACE) 2.5 mg capsule take 1 capsule by mouth once daily 90 capsule 0   • semaglutide, 0.25 or 0.5 mg/dose, (Ozempic, 0.25 or 0.5 MG/DOSE,) 2 mg/3 mL injection pen Use 0.25mg weekly for 4 weeks then 0.5mg weekly 6 mL 1   • bisacodyl (DULCOLAX) 5 mg EC tablet Take 2 tablets (10 mg total) by mouth once for 1 dose (Patient not taking: Reported on 6/15/2023) 2 tablet 0   • gabapentin (NEURONTIN) 800 mg tablet take 1 tablet by mouth three times a day 90 tablet 5   • Insulin Lispro-aabc, 1 U Dial, (Lyumjev KwikPen) 100 UNIT/ML SOPN Inject 6 Units under the skin 3 (three) times a day before meals (Patient not taking: Reported on 10/6/2023) 15 mL 1   • ondansetron (ZOFRAN-ODT) 8 mg disintegrating tablet Take 1 tablet (8 mg total) by mouth every 8 (eight) hours as needed for nausea or vomiting (Patient not taking: Reported on 8/22/2023) 30 tablet 0   • OneTouch Ultra test strip Use 1 each 2 (two) times a day (Patient not taking: Reported on 8/22/2023) 180 strip 3     No current facility-administered medications for this visit.        Objective:    /70   Pulse 97   Temp 97.6 °F (36.4 °C)   Resp 18   Ht 5' 2" (1.575 m)   Wt 74.4 kg (164 lb)   LMP 09/20/2023 (Approximate)   SpO2 94%   BMI 30.00 kg/m²        Physical Exam  Vitals and nursing note reviewed. Constitutional:       Appearance: Normal appearance. She is well-developed. HENT:      Nose: Nose normal.      Mouth/Throat:      Pharynx: Oropharynx is clear. Eyes:      Conjunctiva/sclera: Conjunctivae normal.   Cardiovascular:      Rate and Rhythm: Normal rate and regular rhythm. Pulses: Normal pulses. Heart sounds: Normal heart sounds. No murmur heard. Pulmonary:      Effort: Pulmonary effort is normal.      Breath sounds: Normal breath sounds. Skin:     General: Skin is warm and dry. Neurological:      Mental Status: She is alert.    Psychiatric:         Mood and Affect: Mood normal.         Behavior: Behavior normal.                ASA Robert

## 2023-10-10 DIAGNOSIS — E55.9 VITAMIN D DEFICIENCY: Primary | ICD-10-CM

## 2023-10-10 LAB
25(OH)D3+25(OH)D2 SERPL-MCNC: 17.1 NG/ML (ref 30–100)
BASOPHILS # BLD AUTO: 0.1 X10E3/UL (ref 0–0.2)
BASOPHILS NFR BLD AUTO: 1 %
EOSINOPHIL # BLD AUTO: 0.2 X10E3/UL (ref 0–0.4)
EOSINOPHIL NFR BLD AUTO: 3 %
ERYTHROCYTE [DISTWIDTH] IN BLOOD BY AUTOMATED COUNT: 13.2 % (ref 11.7–15.4)
EST. AVERAGE GLUCOSE BLD GHB EST-MCNC: 160 MG/DL
FERRITIN SERPL-MCNC: 31 NG/ML (ref 15–150)
HBA1C MFR BLD: 7.2 % (ref 4.8–5.6)
HCT VFR BLD AUTO: 43.7 % (ref 34–46.6)
HGB BLD-MCNC: 14.8 G/DL (ref 11.1–15.9)
IMM GRANULOCYTES # BLD: 0 X10E3/UL (ref 0–0.1)
IMM GRANULOCYTES NFR BLD: 0 %
IRON SATN MFR SERPL: 16 % (ref 15–55)
IRON SERPL-MCNC: 53 UG/DL (ref 27–159)
LIPASE SERPL-CCNC: 84 U/L (ref 14–72)
LYMPHOCYTES # BLD AUTO: 1.5 X10E3/UL (ref 0.7–3.1)
LYMPHOCYTES NFR BLD AUTO: 19 %
MCH RBC QN AUTO: 30.8 PG (ref 26.6–33)
MCHC RBC AUTO-ENTMCNC: 33.9 G/DL (ref 31.5–35.7)
MCV RBC AUTO: 91 FL (ref 79–97)
MONOCYTES # BLD AUTO: 0.5 X10E3/UL (ref 0.1–0.9)
MONOCYTES NFR BLD AUTO: 6 %
NEUTROPHILS # BLD AUTO: 5.8 X10E3/UL (ref 1.4–7)
NEUTROPHILS NFR BLD AUTO: 71 %
PLATELET # BLD AUTO: 232 X10E3/UL (ref 150–450)
RBC # BLD AUTO: 4.81 X10E6/UL (ref 3.77–5.28)
TIBC SERPL-MCNC: 338 UG/DL (ref 250–450)
TSH SERPL DL<=0.005 MIU/L-ACNC: 1.03 UIU/ML (ref 0.45–4.5)
UIBC SERPL-MCNC: 285 UG/DL (ref 131–425)
VIT B12 SERPL-MCNC: 421 PG/ML (ref 232–1245)
WBC # BLD AUTO: 8.2 X10E3/UL (ref 3.4–10.8)

## 2023-10-10 RX ORDER — ERGOCALCIFEROL 1.25 MG/1
50000 CAPSULE ORAL WEEKLY
Qty: 8 CAPSULE | Refills: 0 | Status: SHIPPED | OUTPATIENT
Start: 2023-10-10

## 2023-10-18 ENCOUNTER — TELEPHONE (OUTPATIENT)
Dept: OTHER | Facility: HOSPITAL | Age: 48
End: 2023-10-18

## 2023-10-18 NOTE — TELEPHONE ENCOUNTER
Patient called she would like provider to know she still isn't feeling. Patient is nauseous, stomach is upset. Patient stated she left work twice this week from work due to all of this. She took her last Zofran today. Patient stating she is getting depressed cause she is sick and tired of being sick and tired. I did schedule her in for tomorrow. Please advise and let let patient know what provider recommends. Patient asked that if she does not answer return call that a message be left.

## 2023-10-19 ENCOUNTER — OFFICE VISIT (OUTPATIENT)
Dept: FAMILY MEDICINE CLINIC | Facility: CLINIC | Age: 48
End: 2023-10-19
Payer: COMMERCIAL

## 2023-10-19 VITALS
BODY MASS INDEX: 30.07 KG/M2 | TEMPERATURE: 97.9 F | RESPIRATION RATE: 18 BRPM | WEIGHT: 163.4 LBS | HEIGHT: 62 IN | SYSTOLIC BLOOD PRESSURE: 124 MMHG | HEART RATE: 87 BPM | DIASTOLIC BLOOD PRESSURE: 78 MMHG

## 2023-10-19 DIAGNOSIS — K29.00 OTHER ACUTE GASTRITIS WITHOUT HEMORRHAGE: Primary | ICD-10-CM

## 2023-10-19 DIAGNOSIS — E11.42 DIABETIC POLYNEUROPATHY ASSOCIATED WITH TYPE 2 DIABETES MELLITUS (HCC): ICD-10-CM

## 2023-10-19 DIAGNOSIS — I10 PRIMARY HYPERTENSION: ICD-10-CM

## 2023-10-19 PROCEDURE — 99214 OFFICE O/P EST MOD 30 MIN: CPT | Performed by: NURSE PRACTITIONER

## 2023-10-19 RX ORDER — FAMOTIDINE 20 MG/1
20 TABLET, FILM COATED ORAL 2 TIMES DAILY
Qty: 60 TABLET | Refills: 3 | Status: SHIPPED | OUTPATIENT
Start: 2023-10-19 | End: 2024-10-13

## 2023-10-19 RX ORDER — RAMIPRIL 2.5 MG/1
2.5 CAPSULE ORAL DAILY
Qty: 90 CAPSULE | Refills: 1 | Status: SHIPPED | OUTPATIENT
Start: 2023-10-19

## 2023-10-19 RX ORDER — SUCRALFATE 1 G/1
1 TABLET ORAL 4 TIMES DAILY
Qty: 120 TABLET | Refills: 0 | Status: SHIPPED | OUTPATIENT
Start: 2023-10-19

## 2023-10-19 NOTE — PROGRESS NOTES
Assessment/Plan:    R/o H. Pylori  Discussed symptoms may be secondary to Ozempic vs gastritis. Will add Pepcid and Carafate. To schedule f/u with GI    1. Other acute gastritis without hemorrhage  -     famotidine (PEPCID) 20 mg tablet; Take 1 tablet (20 mg total) by mouth 2 (two) times a day  -     sucralfate (CARAFATE) 1 g tablet; Take 1 tablet (1 g total) by mouth 4 (four) times a day  -     H. pylori breath test; Future  -     H. pylori breath test    2. Diabetic polyneuropathy associated with type 2 diabetes mellitus ([address])  Assessment & Plan:  Improved on Ozempic. Discussed her symptoms may be side effects. Lab Results   Component Value Date    HGBA1C 7.2 (H) 10/09/2023       Orders:  -     ramipril (ALTACE) 2.5 mg capsule; Take 1 capsule (2.5 mg total) by mouth daily    3. Primary hypertension  Assessment & Plan:  stable              There are no Patient Instructions on file for this visit. No follow-ups on file. Subjective:      Patient ID: Sue Alas is a 53 y.o. female. Chief Complaint   Patient presents with   • Nausea     Symptoms started about 2 weeks ago Ankit Kraus LPN     • Fatigue   • Headache       Here today for follow up. She is back to feeling nauseas, queasy,  Feels like someone punched her in the stomach. Has been taking Prilosec daily for 1 month  Also taking Pepto, which does help, but hesitant to take too much for fear of constipation. Usually hurts worse after eating and occasionally with drinking. No changes in bowel habits. The following portions of the patient's history were reviewed and updated as appropriate: allergies, current medications, past family history, past medical history, past social history, past surgical history and problem list.    Review of Systems   Constitutional:  Negative for chills and fever. Respiratory: Negative. Cardiovascular: Negative. Gastrointestinal:  Positive for abdominal pain, nausea and vomiting. Negative for constipation and diarrhea. Neurological:  Positive for headaches. Current Outpatient Medications   Medication Sig Dispense Refill   • albuterol (2.5 mg/3 mL) 0.083 % nebulizer solution Take 3 mL (2.5 mg total) by nebulization every 4 (four) hours as needed for wheezing or shortness of breath 100 mL 0   • albuterol (Proventil HFA) 90 mcg/act inhaler Inhale 2 puffs every 6 (six) hours as needed for wheezing or shortness of breath 6.7 g 0   • Continuous Blood Gluc  (Dexcom G7 ) JAVAN Use 1 each continuous 1 each 0   • Continuous Blood Gluc Sensor (Dexcom G7 Sensor) Use 1 Device every 10 days 3 each 5   • dicyclomine (BENTYL) 10 mg capsule Take 1 capsule (10 mg total) by mouth 4 (four) times a day (before meals and at bedtime) (Patient taking differently: Take 10 mg by mouth 4 (four) times a day (before meals and at bedtime) As needed) 30 capsule 1   • ergocalciferol (VITAMIN D2) 50,000 units Take 1 capsule (50,000 Units total) by mouth once a week 8 capsule 0   • escitalopram (LEXAPRO) 20 mg tablet take 1 tablet by mouth once daily 90 tablet 0   • famotidine (PEPCID) 20 mg tablet Take 1 tablet (20 mg total) by mouth 2 (two) times a day 60 tablet 3   • gabapentin (NEURONTIN) 800 mg tablet take 1 tablet by mouth three times a day 90 tablet 5   • insulin glargine (Toujeo Max SoloStar) 300 units/mL CONCENTRATED U-300 injection pen (2-unit dial) Inject 25 Units under the skin daily at bedtime (Patient taking differently: Inject 25 Units under the skin daily at bedtime 8/30/23 Patient stated she is currently taking 20 units at bedtime. ) 15 mL 0   • metFORMIN (GLUCOPHAGE-XR) 500 mg 24 hr tablet Take 2 tablets (1,000 mg total) by mouth 2 (two) times a day with meals 180 tablet 1   • Multiple Vitamin (DAILY VALUE MULTIVITAMIN) TABS Take by mouth daily      • omeprazole (PriLOSEC) 40 MG capsule Take 1 capsule (40 mg total) by mouth daily 30 capsule 1   • ramipril (ALTACE) 2.5 mg capsule Take 1 capsule (2.5 mg total) by mouth daily 90 capsule 1   • semaglutide, 0.25 or 0.5 mg/dose, (Ozempic, 0.25 or 0.5 MG/DOSE,) 2 mg/3 mL injection pen Use 0.25mg weekly for 4 weeks then 0.5mg weekly 6 mL 1   • sucralfate (CARAFATE) 1 g tablet Take 1 tablet (1 g total) by mouth 4 (four) times a day 120 tablet 0   • bisacodyl (DULCOLAX) 5 mg EC tablet Take 2 tablets (10 mg total) by mouth once for 1 dose (Patient not taking: Reported on 6/15/2023) 2 tablet 0   • Insulin Lispro-aabc, 1 U Dial, (Lyumjev KwikPen) 100 UNIT/ML SOPN Inject 6 Units under the skin 3 (three) times a day before meals (Patient not taking: Reported on 10/6/2023) 15 mL 1   • ondansetron (ZOFRAN-ODT) 8 mg disintegrating tablet Take 1 tablet (8 mg total) by mouth every 8 (eight) hours as needed for nausea or vomiting (Patient not taking: Reported on 8/22/2023) 30 tablet 0   • OneTouch Ultra test strip Use 1 each 2 (two) times a day (Patient not taking: Reported on 8/22/2023) 180 strip 3     No current facility-administered medications for this visit. Objective:    /78   Pulse 87   Temp 97.9 °F (36.6 °C)   Resp 18   Ht 5' 2" (1.575 m)   Wt 74.1 kg (163 lb 6.4 oz)   LMP 09/20/2023 (Approximate)   BMI 29.89 kg/m²        Physical Exam  Vitals and nursing note reviewed. Constitutional:       Appearance: Normal appearance. She is well-developed. She is not ill-appearing or diaphoretic. HENT:      Head: Normocephalic and atraumatic. Eyes:      Conjunctiva/sclera: Conjunctivae normal.   Cardiovascular:      Rate and Rhythm: Normal rate and regular rhythm. Pulses: Normal pulses. Heart sounds: Normal heart sounds. No murmur heard. Pulmonary:      Effort: Pulmonary effort is normal. No tachypnea, accessory muscle usage or respiratory distress. Breath sounds: Normal breath sounds. Abdominal:      Tenderness: There is abdominal tenderness (epigastric). Musculoskeletal:      Cervical back: Normal range of motion.    Skin: General: Skin is warm and dry. Coloration: Skin is not pale. Neurological:      Mental Status: She is alert.    Psychiatric:         Mood and Affect: Mood normal.         Speech: Speech normal.         Behavior: Behavior normal.                ASA Flores

## 2023-10-19 NOTE — ASSESSMENT & PLAN NOTE
Improved on Ozempic. Discussed her symptoms may be side effects.   Lab Results   Component Value Date    HGBA1C 7.2 (H) 10/09/2023

## 2023-10-19 NOTE — LETTER
October 19, 2023     Patient: Antonia Easton  YOB: 1975  Date of Visit: 10/19/2023      To Whom it May Concern:    Shelly Jameson is under my professional care. Keenan Montaño was seen in my office on 10/19/2023. Please excuse from work 10/19/23 and 10/20/23    If you have any questions or concerns, please don't hesitate to call.          Sincerely,          ASA Flores        CC:   No Recipients

## 2023-10-24 ENCOUNTER — TELEPHONE (OUTPATIENT)
Dept: OTHER | Facility: OTHER | Age: 48
End: 2023-10-24

## 2023-10-24 ENCOUNTER — PATIENT MESSAGE (OUTPATIENT)
Dept: FAMILY MEDICINE CLINIC | Facility: CLINIC | Age: 48
End: 2023-10-24

## 2023-10-24 DIAGNOSIS — K29.00 ACUTE GASTRITIS WITHOUT HEMORRHAGE, UNSPECIFIED GASTRITIS TYPE: Primary | ICD-10-CM

## 2023-10-24 DIAGNOSIS — R11.0 NAUSEA: ICD-10-CM

## 2023-10-24 NOTE — TELEPHONE ENCOUNTER
Spoke with pt. She is aware of the message. Pt did make an apt with GI as well as seeing Ward Charisse ASHLEY tomorrow. She is feeling very sad today. Her dog passed recently. Apt cancelled, Pt wants to see Gera Mention tomorrow.    rmklpn

## 2023-10-24 NOTE — TELEPHONE ENCOUNTER
Patient stated that she has to be off her Omeprazole for H. Pylori breath test, asked to order stool test. Patient asked to give her 2 weeks excuse from  work note. Please follow up.

## 2023-10-24 NOTE — TELEPHONE ENCOUNTER
I ordered the stool test, however I think she may need to hold her meds for 2 weeks for that test as well. If she is not having any improvement as this time with the medications, would suggest she follow up with GI. As far as the leave, she has already taken a leave from work earlier this year and from a medical standpoint, do not think that this qualifies for a two week leave from work.   Alma Delia Murguia

## 2023-10-25 ENCOUNTER — OFFICE VISIT (OUTPATIENT)
Dept: GASTROENTEROLOGY | Facility: CLINIC | Age: 48
End: 2023-10-25
Payer: COMMERCIAL

## 2023-10-25 ENCOUNTER — OFFICE VISIT (OUTPATIENT)
Dept: FAMILY MEDICINE CLINIC | Facility: CLINIC | Age: 48
End: 2023-10-25
Payer: COMMERCIAL

## 2023-10-25 VITALS
OXYGEN SATURATION: 97 % | HEIGHT: 62 IN | WEIGHT: 158 LBS | HEART RATE: 81 BPM | SYSTOLIC BLOOD PRESSURE: 133 MMHG | BODY MASS INDEX: 29.08 KG/M2 | DIASTOLIC BLOOD PRESSURE: 87 MMHG

## 2023-10-25 VITALS
TEMPERATURE: 96.9 F | HEIGHT: 62 IN | DIASTOLIC BLOOD PRESSURE: 76 MMHG | WEIGHT: 159.8 LBS | HEART RATE: 96 BPM | BODY MASS INDEX: 29.4 KG/M2 | SYSTOLIC BLOOD PRESSURE: 124 MMHG | RESPIRATION RATE: 18 BRPM

## 2023-10-25 DIAGNOSIS — I10 PRIMARY HYPERTENSION: ICD-10-CM

## 2023-10-25 DIAGNOSIS — R11.0 NAUSEA: ICD-10-CM

## 2023-10-25 DIAGNOSIS — K31.84 GASTROPARESIS DUE TO DM: Primary | ICD-10-CM

## 2023-10-25 DIAGNOSIS — E11.42 DIABETIC POLYNEUROPATHY ASSOCIATED WITH TYPE 2 DIABETES MELLITUS (HCC): ICD-10-CM

## 2023-10-25 DIAGNOSIS — F41.9 ANXIETY AND DEPRESSION: ICD-10-CM

## 2023-10-25 DIAGNOSIS — K29.00 OTHER ACUTE GASTRITIS WITHOUT HEMORRHAGE: ICD-10-CM

## 2023-10-25 DIAGNOSIS — E11.43 GASTROPARESIS DUE TO DM: Primary | ICD-10-CM

## 2023-10-25 DIAGNOSIS — R10.13 EPIGASTRIC PAIN: Primary | ICD-10-CM

## 2023-10-25 DIAGNOSIS — F32.A ANXIETY AND DEPRESSION: ICD-10-CM

## 2023-10-25 PROCEDURE — 99214 OFFICE O/P EST MOD 30 MIN: CPT | Performed by: NURSE PRACTITIONER

## 2023-10-25 PROCEDURE — 99214 OFFICE O/P EST MOD 30 MIN: CPT | Performed by: PHYSICIAN ASSISTANT

## 2023-10-25 RX ORDER — OMEPRAZOLE 40 MG/1
40 CAPSULE, DELAYED RELEASE ORAL 2 TIMES DAILY
Qty: 60 CAPSULE | Refills: 4 | Status: SHIPPED | OUTPATIENT
Start: 2023-10-25

## 2023-10-25 RX ORDER — ALPRAZOLAM 0.25 MG/1
0.25 TABLET ORAL 2 TIMES DAILY PRN
Qty: 20 TABLET | Refills: 0 | Status: SHIPPED | OUTPATIENT
Start: 2023-10-25

## 2023-10-25 RX ORDER — ONDANSETRON 4 MG/1
4 TABLET, FILM COATED ORAL EVERY 8 HOURS PRN
Qty: 20 TABLET | Refills: 3 | Status: SHIPPED | OUTPATIENT
Start: 2023-10-25

## 2023-10-25 NOTE — ASSESSMENT & PLAN NOTE
Physical health has been causing her to feel more depressed. Requesting rx for Xanax short term. Sent to pharmacy. Could consider augmenting with Buspirone as well.

## 2023-10-25 NOTE — PROGRESS NOTES
[facility] - Outpatient Follow-up Note  Marcelo Ladd 53 y.o. female MRN: [id number]  Encounter: [id number]          ASSESSMENT AND PLAN:      50-year-old with known gastroparesis secondary to uncontrolled diabetes recently started on Ozempic few months ago who presents for worsening nausea, vomiting, belching, reflux and abdominal discomfort. 1. Gastroparesis due to DM   2.  Worsening nausea, vomiting, epigastric discomfort, belching and reflux  Unfortunately suspect the symptoms are due to Ozempic. Patient has a known history of gastroparesis and suspect this is worsening symptoms. She had recent EGD less than a year ago with biopsies negative for H. pylori. Her recent CBC, TSH was normal.  Abdominal exam is benign.    -Unfortunately Ozempic did significantly help patient's A1c however due to the high likelihood that this is causing her symptoms causing her to miss significant work and have significantly poor oral intake I suspect this may need to be stopped. - We will increase patient's reflux regimen in the meantime with Prilosec 40 twice daily. - Continue Pepcid 20 twice daily.  - Sent course of Zofran to use as needed. - Discussed sticking with liquid diet is much as possible at this time. Use protein supplementation to help with nutrition in the meantime.  -No indication for repeat EGD at this time as last one was less than a year ago. This also was negative for H. pylori, no further testing for this needed at this time.  -Note given for work. I sent a message to patient's PCP as well as endocrinologist to discuss the above. Patient was recommended to follow up in 2-3 months. Patient advised to reach out via "[employer] with any questions or concerns in the meantime.    ______________________________________________________________________    SUBJECTIVE:      Marcelo Ladd is a 53 y.o. female with history of gastroparesis secondary to diabetes, prior episode of pancreatitis thought to be medicine induced, recently started on Ozempic for uncontrolled diabetes who presents to the office for worsening abdominal pain, nausea, vomiting, heartburn and belching. Patient reports diagnosis of gastroparesis many years ago. Her gastric emptying scan is not available for me to review in the chart. She reports even prior to starting Ozempic she would have episodes of nausea and vomiting several times a month. She would often vomit food from the day prior. She reports having heartburn symptoms that however they were more controlled. Since starting Ozempic she reports significant epigastric discomfort, vomiting every other day, nausea, significant belching and heartburn. She is taking Prilosec 40 mg once daily, Pepcid twice daily. She is using Pepto which has been causing some dark stools. She currently does not have any nausea medicine. Patient reports symptoms have been so significant that she has been unable to make it to work. She reports she recently started smoking marijuana to see if it would help with her symptoms however has unfortunately not. Reports last dose of ozempic was on Friday. She reports the dose was increased around 5 weeks ago. Her A1c was significantly uncontrolled at 11 and since starting Ozempic did not improve to 7.2. Most recent CBC, TSH was normal.  Most recent liver enzymes were also normal.    Colonoscopy 12/2022 with polyp removed and repeat recommended in 7 years. REVIEW OF SYSTEMS IS OTHERWISE NEGATIVE.       Historical Information   Past Medical History:   Diagnosis Date    Acute gastritis     [id number] RESOLVED    Allergic     Asthma     Breast pain     [id number] RESOLVED    COVID-19 virus infection 12/1/2021    Diabetes ([address] [facility])     MELLITUS    Diabetes mellitus ([address] )     Viral gastroenteritis     [id number] RESOLVED     Past Surgical History:   Procedure Laterality Date    HAND SURGERY      SKIN BIOPSY       Social History Social History     Substance and Sexual Activity   Alcohol Use Yes    Comment: social     Social History     Substance and Sexual Activity   Drug Use Yes    Types: Marijuana    Comment: medical     Social History     Tobacco Use   Smoking Status Every Day    Packs/day: 0.50    Types: Cigarettes    Start date: 1996   Smokeless Tobacco Never     Family History   Problem Relation Age of Onset    Hypertension Mother     Breast cancer Family     Colon cancer Family     Diabetes Family         MELLITUS    Lung cancer Family     Mental illness Neg Hx     Substance Abuse Neg Hx        Meds/Allergies       Current Outpatient Medications:     albuterol (2.5 mg/3 mL) 0.083 % nebulizer solution    albuterol (Proventil HFA) 90 mcg/act inhaler    ALPRAZolam (XANAX) 0.25 mg tablet    Continuous Blood Gluc  (Dexcom G7 ) JAVAN    Continuous Blood Gluc Sensor (Dexcom G7 Sensor)    ergocalciferol (VITAMIN D2) 50,000 units    escitalopram (LEXAPRO) 20 mg tablet    famotidine (PEPCID) 20 mg tablet    gabapentin (NEURONTIN) 800 mg tablet    insulin glargine (Toujeo Max SoloStar) 300 units/mL CONCENTRATED U-300 injection pen (2-unit dial)    metFORMIN (GLUCOPHAGE-XR) 500 mg 24 hr tablet    Multiple Vitamin (DAILY VALUE MULTIVITAMIN) TABS    omeprazole (PriLOSEC) 40 MG capsule    ondansetron (ZOFRAN) 4 mg tablet    OneTouch Ultra test strip    ramipril (ALTACE) 2.5 mg capsule    semaglutide, 0.25 or 0.5 mg/dose, (Ozempic, 0.25 or 0.5 MG/DOSE,) 2 mg/3 mL injection pen    sucralfate (CARAFATE) 1 g tablet    bisacodyl (DULCOLAX) 5 mg EC tablet    Insulin Lispro-aabc, 1 U Dial, (Lyumjev KwikPen) 100 UNIT/ML SOPN    No Known Allergies        Objective     Blood pressure 133/87, pulse 81, height 5' 2" (1.575 m), weight 71.7 kg (158 lb), last menstrual period 09/20/2023, SpO2 97 %, not currently breastfeeding. Body mass index is 28.9 kg/m². PHYSICAL EXAM:      General Appearance:   Alert, cooperative, no distress. Understandably emotional due to her symptoms. HEENT:   Normocephalic, atraumatic, anicteric. Neck:  Supple, symmetrical, trachea midline   Lungs:   Clear to auscultation bilaterally; no rales, rhonchi or wheezing; respirations unlabored    Heart[de-identified]   Regular rate and rhythm; no murmur, rub, or gallop. Abdomen:   Abdomen is soft and nondistended. She denies any tenderness with palpation throughout. Bowel sounds present   Genitalia:   Deferred    Rectal:   Deferred    Extremities:  No cyanosis, clubbing or edema    Pulses:  2+ and symmetric    Skin:  No jaundice, rashes, or lesions    Lymph nodes:  No palpable cervical lymphadenopathy        Lab Results:   No visits with results within 1 Day(s) from this visit. Latest known visit with results is:   Office Visit on 10/06/2023   Component Date Value    TSH 10/09/2023 1.030     Vitamin B-12 10/09/2023 421     White Blood Cell Count 10/09/2023 8.2     Red Blood Cell Count 10/09/2023 4.81     Hemoglobin 10/09/2023 14.8     HCT 10/09/2023 43.7     MCV 10/09/2023 91     MCH 10/09/2023 30.8     MCHC 10/09/2023 33.9     RDW 10/09/2023 13.2     Platelet Count 84/62/6518 232     Neutrophils 10/09/2023 71     Lymphocytes 10/09/2023 19     Monocytes 10/09/2023 6     Eosinophils 10/09/2023 3     Basophils PCT 10/09/2023 1     Neutrophils (Absolute) 10/09/2023 5.8     Lymphocytes (Absolute) 10/09/2023 1.5     Monocytes (Absolute) 10/09/2023 0.5     Eosinophils (Absolute) 10/09/2023 0.2     Basophils ABS 10/09/2023 0.1     Immature Granulocytes 10/09/2023 0     Immature Granulocytes (A* 10/09/2023 0.0     Lipase, Serum 10/09/2023 84 (H)     Total Iron Binding [place]* 10/09/2023 338     UIBC 10/09/2023 285     Iron, Serum 10/09/2023 53     Iron Saturation 10/09/2023 16     Ferritin 10/09/2023 31     Hemoglobin A1C 10/09/2023 7.2 (H)     Estimated Average Glucose 10/09/2023 160     25-HYDROXY VIT D 10/09/2023 17.1 (L)          Radiology Results:   No results found.

## 2023-10-25 NOTE — ASSESSMENT & PLAN NOTE
Controlled, however discussed Ozempic may also be contributing to her symptoms.   Follows with endo    Lab Results   Component Value Date    HGBA1C 7.2 (H) 10/09/2023

## 2023-10-25 NOTE — PATIENT INSTRUCTIONS
Increase Prilosec to 40 mg twice a day. Continue Pepcid 20 mg twice a day. You can use Pepto as needed for more instant relief of symptoms. You can use zofran as needed for nausea.

## 2023-10-25 NOTE — PROGRESS NOTES
Assessment/Plan:    Will await GI eval for further plan. Will f/u with pt after visit to determine out of work status. 1. Epigastric pain    2. Nausea    3. Diabetic polyneuropathy associated with type 2 diabetes mellitus ([address])  Assessment & Plan:  Controlled, however discussed Ozempic may also be contributing to her symptoms. Follows with endo    Lab Results   Component Value Date    HGBA1C 7.2 (H) 10/09/2023         4. Anxiety and depression  Assessment & Plan:  Physical health has been causing her to feel more depressed. Requesting rx for Xanax short term. Sent to pharmacy. Could consider augmenting with Buspirone as well. Orders:  -     ALPRAZolam (XANAX) 0.25 mg tablet; Take 1 tablet (0.25 mg total) by mouth 2 (two) times a day as needed for anxiety    5. Primary hypertension  Assessment & Plan:  stable            There are no Patient Instructions on file for this visit. No follow-ups on file. Subjective:      Patient ID: Marciano Matos is a 53 y.o. female. Chief Complaint   Patient presents with   • Follow-up     Stomach ache, headache, fatigue. Ongoing symptoms Sebastián Crow LPN     • Anxiety       Here today for follow up. Continues with abdominal pain, nausea, and occasional vomiting. Taking PPI, Pepcid and Carafate with no relief. She is also belching more frequently. Physical health is making her depressed. Physically feel like she can't do her work and this is frustrating her. Family is also concerned. She is requesting a leave from work for two weeks. She has an appt with GI later today. The following portions of the patient's history were reviewed and updated as appropriate: allergies, current medications, past family history, past medical history, past social history, past surgical history and problem list.    Review of Systems   Constitutional:  Positive for fatigue. Gastrointestinal:  Positive for abdominal pain, nausea and vomiting.    All other systems reviewed and are negative. Current Outpatient Medications   Medication Sig Dispense Refill   • albuterol (2.5 mg/3 mL) 0.083 % nebulizer solution Take 3 mL (2.5 mg total) by nebulization every 4 (four) hours as needed for wheezing or shortness of breath 100 mL 0   • albuterol (Proventil HFA) 90 mcg/act inhaler Inhale 2 puffs every 6 (six) hours as needed for wheezing or shortness of breath 6.7 g 0   • ALPRAZolam (XANAX) 0.25 mg tablet Take 1 tablet (0.25 mg total) by mouth 2 (two) times a day as needed for anxiety 20 tablet 0   • bisacodyl (DULCOLAX) 5 mg EC tablet Take 2 tablets (10 mg total) by mouth once for 1 dose 2 tablet 0   • Continuous Blood Gluc  (Dexcom G7 ) JAVAN Use 1 each continuous 1 each 0   • Continuous Blood Gluc Sensor (Dexcom G7 Sensor) Use 1 Device every 10 days 3 each 5   • dicyclomine (BENTYL) 10 mg capsule Take 1 capsule (10 mg total) by mouth 4 (four) times a day (before meals and at bedtime) (Patient taking differently: Take 10 mg by mouth 4 (four) times a day (before meals and at bedtime) As needed) 30 capsule 1   • ergocalciferol (VITAMIN D2) 50,000 units Take 1 capsule (50,000 Units total) by mouth once a week 8 capsule 0   • escitalopram (LEXAPRO) 20 mg tablet take 1 tablet by mouth once daily 90 tablet 0   • famotidine (PEPCID) 20 mg tablet Take 1 tablet (20 mg total) by mouth 2 (two) times a day 60 tablet 3   • gabapentin (NEURONTIN) 800 mg tablet take 1 tablet by mouth three times a day 90 tablet 5   • insulin glargine (Toujeo Max SoloStar) 300 units/mL CONCENTRATED U-300 injection pen (2-unit dial) Inject 25 Units under the skin daily at bedtime (Patient taking differently: Inject 25 Units under the skin daily at bedtime 8/30/23 Patient stated she is currently taking 20 units at bedtime. ) 15 mL 0   • Insulin Lispro-aabc, 1 U Dial, (Lyumjev KwikPen) 100 UNIT/ML SOPN Inject 6 Units under the skin 3 (three) times a day before meals 15 mL 1   • metFORMIN (GLUCOPHAGE-XR) 500 mg 24 hr tablet Take 2 tablets (1,000 mg total) by mouth 2 (two) times a day with meals 180 tablet 1   • Multiple Vitamin (DAILY VALUE MULTIVITAMIN) TABS Take by mouth daily      • omeprazole (PriLOSEC) 40 MG capsule Take 1 capsule (40 mg total) by mouth daily 30 capsule 1   • OneTouch Ultra test strip Use 1 each 2 (two) times a day 180 strip 3   • ramipril (ALTACE) 2.5 mg capsule Take 1 capsule (2.5 mg total) by mouth daily 90 capsule 1   • semaglutide, 0.25 or 0.5 mg/dose, (Ozempic, 0.25 or 0.5 MG/DOSE,) 2 mg/3 mL injection pen Use 0.25mg weekly for 4 weeks then 0.5mg weekly 6 mL 1   • sucralfate (CARAFATE) 1 g tablet Take 1 tablet (1 g total) by mouth 4 (four) times a day 120 tablet 0   • ondansetron (ZOFRAN-ODT) 8 mg disintegrating tablet Take 1 tablet (8 mg total) by mouth every 8 (eight) hours as needed for nausea or vomiting (Patient not taking: Reported on 8/22/2023) 30 tablet 0     No current facility-administered medications for this visit. Objective:    /76   Pulse 96   Temp (!) 96.9 °F (36.1 °C)   Resp 18   Ht 5' 2" (1.575 m)   Wt 72.5 kg (159 lb 12.8 oz)   LMP 09/20/2023 (Approximate)   BMI 29.23 kg/m²        Physical Exam  Vitals and nursing note reviewed. Constitutional:       Appearance: Normal appearance. She is well-developed. She is not ill-appearing or diaphoretic. HENT:      Head: Normocephalic and atraumatic. Eyes:      Conjunctiva/sclera: Conjunctivae normal.   Pulmonary:      Effort: Pulmonary effort is normal. No tachypnea, accessory muscle usage or respiratory distress. Musculoskeletal:      Cervical back: Normal range of motion. Skin:     Coloration: Skin is not pale. Neurological:      Mental Status: She is alert.    Psychiatric:         Mood and Affect: Mood normal.         Speech: Speech normal.         Behavior: Behavior normal.                ASA Khan

## 2023-10-26 ENCOUNTER — TELEPHONE (OUTPATIENT)
Dept: FAMILY MEDICINE CLINIC | Facility: CLINIC | Age: 48
End: 2023-10-26

## 2023-10-26 NOTE — TELEPHONE ENCOUNTER
Spoke w/ pt regarding GI f/u. She plans to follow up with endo today regarding Ozempic and new plan for her diabetes management. Note given to remain out of work with a return date of 11/13/23.   Anne Cano

## 2023-10-30 ENCOUNTER — TELEPHONE (OUTPATIENT)
Age: 48
End: 2023-10-30

## 2023-10-30 NOTE — TELEPHONE ENCOUNTER
PA for Omeprazole BID sent through [facility]  Key:  [id number] determination

## 2023-10-30 NOTE — TELEPHONE ENCOUNTER
Pharmacy Benefits     [facility], [address] (OPTUM_IRX)    Covered: Retail, Mail Order    Unknown: Specialty, Long-Term Care   Member ID: [id number]   Group ID: [id number] Raw name:     Monica Omar: [id number]   PCN: Price Sender

## 2023-11-02 NOTE — TELEPHONE ENCOUNTER
PA for omeprazole is approved through [facility]. Patient notified through [address] [place] [address]. Pharmacy notified.

## 2023-11-16 DIAGNOSIS — R20.2 PARESTHESIA: ICD-10-CM

## 2023-11-16 RX ORDER — GABAPENTIN 800 MG/1
800 TABLET ORAL 3 TIMES DAILY
Qty: 90 TABLET | Refills: 5 | Status: SHIPPED | OUTPATIENT
Start: 2023-11-16

## 2023-11-17 DIAGNOSIS — K29.00 OTHER ACUTE GASTRITIS WITHOUT HEMORRHAGE: ICD-10-CM

## 2023-11-17 RX ORDER — SUCRALFATE 1 G/1
1 TABLET ORAL 4 TIMES DAILY
Qty: 120 TABLET | Refills: 5 | Status: SHIPPED | OUTPATIENT
Start: 2023-11-17

## 2023-11-28 ENCOUNTER — TELEPHONE (OUTPATIENT)
Dept: PAIN MEDICINE | Facility: CLINIC | Age: 48
End: 2023-11-28

## 2023-11-28 NOTE — TELEPHONE ENCOUNTER
Dr. Courtney Coleman patient    [facility] only sees Sudarshan Ramona patients    Please reschedule Refill approved as requested.

## 2023-11-28 NOTE — TELEPHONE ENCOUNTER
Caller: [place] [facility]    Doctor: Dr Sabrina Lua    Reason for call: Pt has been scheduled with [facility] on 01/15/2023.  Pt has enough medication to hold her but will call back once she gets low    Call back#: [phone]

## 2023-11-29 ENCOUNTER — TELEPHONE (OUTPATIENT)
Dept: ENDOCRINOLOGY | Facility: CLINIC | Age: 48
End: 2023-11-29

## 2023-11-29 NOTE — TELEPHONE ENCOUNTER
Pt left v/m confirming email address we have and it is the correct one. Called pt back and suggested to check [employer] or spa[employer] folder for invite.

## 2023-11-30 ENCOUNTER — OFFICE VISIT (OUTPATIENT)
Dept: ENDOCRINOLOGY | Facility: CLINIC | Age: 48
End: 2023-11-30
Payer: COMMERCIAL

## 2023-11-30 VITALS
SYSTOLIC BLOOD PRESSURE: 132 MMHG | BODY MASS INDEX: 30.18 KG/M2 | TEMPERATURE: 97.6 F | DIASTOLIC BLOOD PRESSURE: 90 MMHG | WEIGHT: 164 LBS | RESPIRATION RATE: 14 BRPM | HEART RATE: 80 BPM | HEIGHT: 62 IN | OXYGEN SATURATION: 95 %

## 2023-11-30 DIAGNOSIS — Z79.4 TYPE 2 DIABETES MELLITUS WITH HYPERGLYCEMIA, WITH LONG-TERM CURRENT USE OF INSULIN (HCC): Primary | ICD-10-CM

## 2023-11-30 DIAGNOSIS — E11.43 GASTROPARESIS DUE TO DM: ICD-10-CM

## 2023-11-30 DIAGNOSIS — E11.65 TYPE 2 DIABETES MELLITUS WITH HYPERGLYCEMIA, WITH LONG-TERM CURRENT USE OF INSULIN (HCC): Primary | ICD-10-CM

## 2023-11-30 DIAGNOSIS — E55.9 VITAMIN D DEFICIENCY: ICD-10-CM

## 2023-11-30 DIAGNOSIS — K31.84 GASTROPARESIS DUE TO DM: ICD-10-CM

## 2023-11-30 DIAGNOSIS — E11.42 DIABETIC POLYNEUROPATHY ASSOCIATED WITH TYPE 2 DIABETES MELLITUS (HCC): ICD-10-CM

## 2023-11-30 PROCEDURE — 99214 OFFICE O/P EST MOD 30 MIN: CPT | Performed by: INTERNAL MEDICINE

## 2023-11-30 PROCEDURE — 95251 CONT GLUC MNTR ANALYSIS I&R: CPT | Performed by: INTERNAL MEDICINE

## 2023-11-30 RX ORDER — METFORMIN HYDROCHLORIDE 500 MG/1
1000 TABLET, EXTENDED RELEASE ORAL 2 TIMES DAILY WITH MEALS
Qty: 180 TABLET | Refills: 1 | Status: SHIPPED | OUTPATIENT
Start: 2023-11-30

## 2023-11-30 NOTE — PROGRESS NOTES
Follow-up Patient Progress Note      CC: type 2 diabetes     History of Present Illness:   56 yr female with type 2 diabetes for 12 yrs, hx of GDM in 2005, gastroparesis, neuropathy, vitamin D deficiency, asthma. Last visit was 8/22/2023. Since last visit, weight is stable. Ozempic caused GI side effects as suspected and was stopped. CGM data review[de-identified]  Device: dexcomG7     Dates:  11/10/23-11/24/23             Usage: 79 %             Av glu: 110 mg/dL             SD: 54 mg/dL      CV:   %            GMI:   %  TIR: 61 %                    TAR: 11+1 %             TBR: 14+13 %     Glycemic patters:  Fasting and postprandial hyperglycemia. Hypoglycemia: No     Current meds:  Metformin 500 mg twice a day  Toujeo 16 units nightly     Opthamology: yes, 1/11/23  Podiatry:   vaccination:   Dental:  Pancreatitis: yes     Ace/ARB: ramipril  Statin: no  Thyroid issues: no    Patient Active Problem List   Diagnosis    Adverse reaction to statin medication    Allergic asthma, mild intermittent, uncomplicated    Anxiety and depression    Diabetes mellitus with polyneuropathy (HCC)    Burning sensation    Fatigue    Left breast mass    Low back pain    Nicotine dependence    Overweight (BMI 25.0-29. 9)    AC joint arthropathy    Plantar fasciitis    Onychomycosis    Globus sensation    Gastroparesis due to DM     Primary hypertension    Abdominal pain     Past Medical History:   Diagnosis Date    Acute gastritis     [id number] RESOLVED    Allergic     Asthma     Breast pain     [id number] RESOLVED    COVID-19 virus infection 12/1/2021    Diabetes ([address])     MELLITUS    Diabetes mellitus ([address])     Viral gastroenteritis     [id number] RESOLVED      Past Surgical History:   Procedure Laterality Date    HAND SURGERY      SKIN BIOPSY        Family History   Problem Relation Age of Onset    Hypertension Mother     Breast cancer Family     Colon cancer Family     Diabetes Family         MELLITUS    Lung cancer Family     Mental illness Neg Hx     Substance Abuse Neg Hx      Social History     Tobacco Use    Smoking status: Every Day     Packs/day: 0.50     Types: Cigarettes     Start date: 1996    Smokeless tobacco: Never   Substance Use Topics    Alcohol use: Yes     Comment: social     No Known Allergies      Current Outpatient Medications:     albuterol (2.5 mg/3 mL) 0.083 % nebulizer solution, Take 3 mL (2.5 mg total) by nebulization every 4 (four) hours as needed for wheezing or shortness of breath, Disp: 100 mL, Rfl: 0    albuterol (Proventil HFA) 90 mcg/act inhaler, Inhale 2 puffs every 6 (six) hours as needed for wheezing or shortness of breath, Disp: 6.7 g, Rfl: 0    ALPRAZolam (XANAX) 0.25 mg tablet, Take 1 tablet (0.25 mg total) by mouth 2 (two) times a day as needed for anxiety, Disp: 20 tablet, Rfl: 0    Continuous Blood Gluc  (Dexcom G7 ) JAVAN, Use 1 each continuous, Disp: 1 each, Rfl: 0    Continuous Blood Gluc Sensor (Dexcom G7 Sensor), Use 1 Device every 10 days, Disp: 3 each, Rfl: 5    ergocalciferol (VITAMIN D2) 50,000 units, Take 1 capsule (50,000 Units total) by mouth once a week, Disp: 8 capsule, Rfl: 0    escitalopram (LEXAPRO) 20 mg tablet, take 1 tablet by mouth once daily, Disp: 90 tablet, Rfl: 0    famotidine (PEPCID) 20 mg tablet, Take 1 tablet (20 mg total) by mouth 2 (two) times a day, Disp: 60 tablet, Rfl: 3    gabapentin (NEURONTIN) 800 mg tablet, Take 1 tablet (800 mg total) by mouth 3 (three) times a day, Disp: 90 tablet, Rfl: 5    insulin glargine (Toujeo Max SoloStar) 300 units/mL CONCENTRATED U-300 injection pen (2-unit dial), Inject 25 Units under the skin daily at bedtime (Patient taking differently: Inject 25 Units under the skin daily at bedtime 8/30/23 Patient stated she is currently taking 20 units at bedtime.), Disp: 15 mL, Rfl: 0    metFORMIN (GLUCOPHAGE-XR) 500 mg 24 hr tablet, Take 2 tablets (1,000 mg total) by mouth 2 (two) times a day with meals, Disp: 180 tablet, Rfl: 1 Multiple Vitamin (DAILY VALUE MULTIVITAMIN) TABS, Take by mouth daily , Disp: , Rfl:     omeprazole (PriLOSEC) 40 MG capsule, Take 1 capsule (40 mg total) by mouth 2 (two) times a day, Disp: 60 capsule, Rfl: 4    ondansetron (ZOFRAN) 4 mg tablet, Take 1 tablet (4 mg total) by mouth every 8 (eight) hours as needed for nausea or vomiting, Disp: 20 tablet, Rfl: 3    OneTouch Ultra test strip, Use 1 each 2 (two) times a day, Disp: 180 strip, Rfl: 3    ramipril (ALTACE) 2.5 mg capsule, Take 1 capsule (2.5 mg total) by mouth daily, Disp: 90 capsule, Rfl: 1    bisacodyl (DULCOLAX) 5 mg EC tablet, Take 2 tablets (10 mg total) by mouth once for 1 dose (Patient not taking: Reported on 10/25/2023), Disp: 2 tablet, Rfl: 0    Insulin Lispro-aabc, 1 U Dial, (Lyumjev KwikPen) 100 UNIT/ML SOPN, Inject 6 Units under the skin 3 (three) times a day before meals (Patient not taking: Reported on 11/30/2023), Disp: 15 mL, Rfl: 1    semaglutide, 0.25 or 0.5 mg/dose, (Ozempic, 0.25 or 0.5 MG/DOSE,) 2 mg/3 mL injection pen, Use 0.25mg weekly for 4 weeks then 0.5mg weekly (Patient not taking: Reported on 11/30/2023), Disp: 6 mL, Rfl: 1    sucralfate (CARAFATE) 1 g tablet, take 1 tablet by mouth four times a day (Patient not taking: Reported on 11/30/2023), Disp: 120 tablet, Rfl: 5    Review of Systems   HENT: Negative. Eyes: Negative. Respiratory: Negative. Cardiovascular: Negative. Gastrointestinal: Negative. Endocrine: Negative. Musculoskeletal: Negative. Skin: Negative. Allergic/Immunologic: Negative. Neurological: Negative. Hematological: Negative. Psychiatric/Behavioral: Negative. Physical Exam:  Body mass index is 30 kg/m².   /90 (BP Location: Right arm, Patient Position: Sitting)   Pulse 80   Temp 97.6 °F (36.4 °C) (Tympanic)   Resp 14   Ht 5' 2" (1.575 m)   Wt 74.4 kg (164 lb)   SpO2 95%   BMI 30.00 kg/m²    Vitals:    11/30/23 0859   Weight: 74.4 kg (164 lb)        Physical Exam  Constitutional:       General: She is not in acute distress. Appearance: She is well-developed. She is not ill-appearing. HENT:      Head: Normocephalic and atraumatic. Nose: Nose normal.      Mouth/Throat:      Pharynx: Oropharynx is clear. Eyes:      Extraocular Movements: Extraocular movements intact. Conjunctiva/sclera: Conjunctivae normal.   Neck:      Thyroid: No thyromegaly. Cardiovascular:      Rate and Rhythm: Normal rate. Pulmonary:      Effort: Pulmonary effort is normal.   Musculoskeletal:         General: No deformity. Cervical back: Normal range of motion. Skin:     Capillary Refill: Capillary refill takes less than 2 seconds. Coloration: Skin is not pale. Findings: No rash. Neurological:      Mental Status: She is alert and oriented to person, place, and time. Psychiatric:         Behavior: Behavior normal.       Labs:   Lab Results   Component Value Date    HGBA1C 7.2 (H) 10/09/2023       Lab Results   Component Value Date    QEL1CIIXWDRW 0.79 06/07/2017    TSH 1.030 10/09/2023       Lab Results   Component Value Date    CREATININE 0.71 06/15/2023    CREATININE 0.89 10/22/2022    CREATININE 0.94 10/21/2022    BUN 8 06/15/2023     06/07/2017    K 4.4 06/15/2023    CL 96 06/15/2023    CO2 22 06/15/2023     eGFR   Date Value Ref Range Status   06/15/2023 105 >59 mL/min/1.73 Final   10/22/2022 77 ml/min/1.73sq m Final       Lab Results   Component Value Date    ALT 9 06/15/2023    AST 12 06/15/2023    ALKPHOS 77 10/22/2022    BILITOT 0.6 06/07/2017       Lab Results   Component Value Date    CHOLESTEROL 140 01/24/2018     Lab Results   Component Value Date    HDL 55 01/24/2018    HDL 56 02/17/2017     Lab Results   Component Value Date    TRIG 301 (H) 10/22/2022    TRIG 52 01/24/2018    TRIG 72 02/17/2017     Lab Results   Component Value Date    NONHDLC 107 02/17/2017         Impression:  1.  Type 2 diabetes mellitus with hyperglycemia, with long-term current use of insulin ([address])    2. Diabetic polyneuropathy associated with type 2 diabetes mellitus ([address])    3. Gastroparesis due to DM     4. Vitamin D deficiency         Plan:    [place] was seen today for follow-up. Diagnoses and all orders for this visit:    Type 2 diabetes mellitus with hyperglycemia, with long-term current use of insulin ([address] She is well controlled with significant fasting hypoglycemia with TBR 25%. Today we discussed and agreed to use metformin only and stop all other meds including insulin. Follow up in 3 months. -     metFORMIN (GLUCOPHAGE-XR) 500 mg 24 hr tablet; Take 2 tablets (1,000 mg total) by mouth 2 (two) times a day with meals    Diabetic polyneuropathy associated with type 2 diabetes mellitus (HCC)    Gastroparesis due to DM . Improved. Vitamin D deficiency. Take vit D3 5000IU daily. I have spent 32 minutes with patient today in which greater than 50% of this time was spent in counseling/coordination of care. Discussed with the patient and all questioned fully answered. She will call me if any problems arise. Educated/ Counseled patient on diagnostic test results, prognosis, risk vs benefit of treatment options, importance of treatment compliance, healthy life and lifestyle choices.       [facility]

## 2024-02-19 ENCOUNTER — OFFICE VISIT (OUTPATIENT)
Dept: PAIN MEDICINE | Facility: CLINIC | Age: 49
End: 2024-02-19
Payer: COMMERCIAL

## 2024-02-19 VITALS
HEIGHT: 62 IN | HEART RATE: 74 BPM | BODY MASS INDEX: 32.02 KG/M2 | DIASTOLIC BLOOD PRESSURE: 70 MMHG | RESPIRATION RATE: 16 BRPM | SYSTOLIC BLOOD PRESSURE: 134 MMHG | WEIGHT: 174 LBS

## 2024-02-19 DIAGNOSIS — M79.18 MYOFASCIAL PAIN SYNDROME: ICD-10-CM

## 2024-02-19 DIAGNOSIS — G89.4 CHRONIC PAIN SYNDROME: Primary | ICD-10-CM

## 2024-02-19 DIAGNOSIS — M25.552 LEFT HIP PAIN: ICD-10-CM

## 2024-02-19 DIAGNOSIS — G60.9 IDIOPATHIC PERIPHERAL NEUROPATHY: ICD-10-CM

## 2024-02-19 DIAGNOSIS — R20.2 PARESTHESIA: ICD-10-CM

## 2024-02-19 PROCEDURE — 99214 OFFICE O/P EST MOD 30 MIN: CPT

## 2024-02-19 RX ORDER — GABAPENTIN 800 MG/1
800 TABLET ORAL 3 TIMES DAILY
Qty: 270 TABLET | Refills: 3 | Status: SHIPPED | OUTPATIENT
Start: 2024-02-19

## 2024-02-19 NOTE — PROGRESS NOTES
Assessment:  1. Chronic pain syndrome    2. Left hip pain    3. Idiopathic peripheral neuropathy    4. Myofascial pain syndrome    5. Paresthesia        Plan:  The patient is a 49-year-old female with a history of chronic pain secondary to low back pain, sacroiliitis, bilateral hand pain, bilateral feet pain, peripheral neuropathy and left hip pain who presents to the office with ongoing pain in bilateral hands and bilateral feet and worsening left hip pain.    At this time, I will x-ray her left hip for further evaluation.  I instructed patient I will call once I receive the results.  I will also refer her to chiropractic therapy, as she has completed chiropractic therapy in the past which has been helpful.  A referral was placed and provided to the patient.    Overall her pain continues to be managed with taking gabapentin, therefore I will continue her on this medication as prescribed.  Refills were electronically sent to the patient's pharmacy.    My impressions and treatment recommendations were discussed in detail with the patient who verbalized understanding and had no further questions.  Discharge instructions were provided. I personally saw and examined the patient and I agree with the above discussed plan of care.    Orders Placed This Encounter   Procedures    XR hip/pelv 2-3 vws left if performed     Standing Status:   Future     Standing Expiration Date:   2/19/2028     Scheduling Instructions:      Bring along any outside films relating to this procedure.           Order Specific Question:   Is the patient pregnant?     Answer:   No    Ambulatory referral to Chiropractic     Standing Status:   Future     Standing Expiration Date:   2/19/2025     Referral Priority:   Routine     Referral Type:   Chiropractic     Referral Reason:   Specialty Services Required     Referred to Provider:   Manish Diaz DC     Requested Specialty:   Chiropractic Medicine     Number of Visits Requested:   1     Expiration  Date:   2/19/2025     New Medications Ordered This Visit   Medications    gabapentin (NEURONTIN) 800 mg tablet     Sig: Take 1 tablet (800 mg total) by mouth 3 (three) times a day     Dispense:  270 tablet     Refill:  3       History of Present Illness:  Krysten Sanz is a 49 y.o. female with a history of chronic pain secondary to low back pain, sacroiliitis, bilateral hand pain, bilateral feet pain, peripheral neuropathy and left hip pain.  She was last seen on 7/20/2022 where she was continued on gabapentin.  She presents to the office with on going pain in bilateral hands and bilateral feet and worsening left hip pain.    She states her pain is the same since the last office visit and occasional but worse at night.  She rates quality of her pain as burning, dull/aching, pins/needles and is currently rating her pain a 5/10 on a numeric scale.    Current pain medications include gabapentin 800 mg 1 tablet 3 times a day which is providing her 90% relief of her pain without side effects.    I have personally reviewed and/or updated the patient's past medical history, past surgical history, family history, social history, current medications, allergies, and vital signs today.     Review of Systems   Respiratory:  Negative for shortness of breath.    Cardiovascular:  Negative for chest pain.   Gastrointestinal:  Negative for constipation, diarrhea, nausea and vomiting.   Musculoskeletal:  Positive for back pain, gait problem and joint swelling. Negative for arthralgias and myalgias.        Left Hip Pain   Skin:  Negative for rash.   Neurological:  Negative for dizziness, seizures and weakness.   All other systems reviewed and are negative.      Patient Active Problem List   Diagnosis    Adverse reaction to statin medication    Allergic asthma, mild intermittent, uncomplicated    Anxiety and depression    Diabetes mellitus with polyneuropathy (HCC)    Burning sensation    Fatigue    Left breast mass    Low back  pain    Nicotine dependence    AC joint arthropathy    Plantar fasciitis    Onychomycosis    Globus sensation    Gastroparesis due to DM     Primary hypertension    Abdominal pain       Past Medical History:   Diagnosis Date    Acute gastritis     [id number] RESOLVED    Allergic     Asthma     Breast pain     [id number] RESOLVED    COVID-19 virus infection 12/1/2021    Diabetes (HCC)     MELLITUS    Diabetes mellitus (HCC)     Viral gastroenteritis     [id number] RESOLVED       Past Surgical History:   Procedure Laterality Date    HAND SURGERY      SKIN BIOPSY         Family History   Problem Relation Age of Onset    Hypertension Mother     Breast cancer Family     Colon cancer Family     Diabetes Family         MELLITUS    Lung cancer Family     Mental illness Neg Hx     Substance Abuse Neg Hx        Social History     Occupational History    Not on file   Tobacco Use    Smoking status: Every Day     Current packs/day: 0.50     Average packs/day: 0.5 packs/day for 28.1 years (14.1 ttl pk-yrs)     Types: Cigarettes     Start date: 1996    Smokeless tobacco: Never   Vaping Use    Vaping status: Never Used   Substance and Sexual Activity    Alcohol use: Yes     Comment: social    Drug use: Yes     Types: Marijuana     Comment: medical    Sexual activity: Yes     Partners: Male     Birth control/protection: None       Current Outpatient Medications on File Prior to Visit   Medication Sig    albuterol (2.5 mg/3 mL) 0.083 % nebulizer solution Take 3 mL (2.5 mg total) by nebulization every 4 (four) hours as needed for wheezing or shortness of breath    albuterol (Proventil HFA) 90 mcg/act inhaler Inhale 2 puffs every 6 (six) hours as needed for wheezing or shortness of breath    ALPRAZolam (XANAX) 0.25 mg tablet Take 1 tablet (0.25 mg total) by mouth 2 (two) times a day as needed for anxiety    Continuous Blood Gluc  (Dexcom G7 ) JAVAN Use 1 each continuous    Continuous Blood Gluc Sensor (Dexcom G7 Sensor)  "Use 1 Device every 10 days    ergocalciferol (VITAMIN D2) 50,000 units Take 1 capsule (50,000 Units total) by mouth once a week    escitalopram (LEXAPRO) 20 mg tablet take 1 tablet by mouth daily    famotidine (PEPCID) 20 mg tablet Take 1 tablet (20 mg total) by mouth 2 (two) times a day    metFORMIN (GLUCOPHAGE-XR) 500 mg 24 hr tablet Take 2 tablets (1,000 mg total) by mouth 2 (two) times a day with meals    Multiple Vitamin (DAILY VALUE MULTIVITAMIN) TABS Take by mouth daily     omeprazole (PriLOSEC) 40 MG capsule Take 1 capsule (40 mg total) by mouth 2 (two) times a day    ondansetron (ZOFRAN) 4 mg tablet Take 1 tablet (4 mg total) by mouth every 8 (eight) hours as needed for nausea or vomiting    OneTouch Ultra test strip Use 1 each 2 (two) times a day    [DISCONTINUED] gabapentin (NEURONTIN) 800 mg tablet Take 1 tablet (800 mg total) by mouth 3 (three) times a day    bisacodyl (DULCOLAX) 5 mg EC tablet Take 2 tablets (10 mg total) by mouth once for 1 dose (Patient not taking: Reported on 10/25/2023)    ramipril (ALTACE) 2.5 mg capsule Take 1 capsule (2.5 mg total) by mouth daily    sucralfate (CARAFATE) 1 g tablet take 1 tablet by mouth four times a day (Patient not taking: Reported on 11/30/2023)     No current facility-administered medications on file prior to visit.       No Known Allergies    Physical Exam:    /70   Pulse 74   Resp 16   Ht 5' 2\" (1.575 m)   Wt 78.9 kg (174 lb)   BMI 31.83 kg/m²     Constitutional:normal, well developed, well nourished, alert, in no distress and non-toxic and no overt pain behavior.  Eyes:anicteric  HEENT:grossly intact  Neck:supple, symmetric, trachea midline and no masses   Pulmonary:even and unlabored  Cardiovascular:No edema or pitting edema present  Skin:Normal without rashes or lesions and well hydrated  Psychiatric:Mood and affect appropriate  Neurologic:Cranial Nerves II-XII grossly intact  Musculoskeletal:normal    Imaging    "

## 2024-02-26 ENCOUNTER — OFFICE VISIT (OUTPATIENT)
Age: 49
End: 2024-02-26
Payer: COMMERCIAL

## 2024-02-26 VITALS
BODY MASS INDEX: 32.02 KG/M2 | SYSTOLIC BLOOD PRESSURE: 137 MMHG | DIASTOLIC BLOOD PRESSURE: 72 MMHG | WEIGHT: 174 LBS | HEIGHT: 62 IN

## 2024-02-26 DIAGNOSIS — M24.552 HIP FLEXOR TENDON TIGHTNESS, LEFT: ICD-10-CM

## 2024-02-26 DIAGNOSIS — M99.02 SEGMENTAL DYSFUNCTION OF THORACIC REGION: ICD-10-CM

## 2024-02-26 DIAGNOSIS — M99.04 SEGMENTAL DYSFUNCTION OF SACRAL REGION: Primary | ICD-10-CM

## 2024-02-26 DIAGNOSIS — M99.01 SEGMENTAL DYSFUNCTION OF CERVICAL REGION: ICD-10-CM

## 2024-02-26 DIAGNOSIS — M99.03 SEGMENTAL DYSFUNCTION OF LUMBAR REGION: ICD-10-CM

## 2024-02-26 DIAGNOSIS — M25.552 LEFT HIP PAIN: ICD-10-CM

## 2024-02-26 DIAGNOSIS — M79.18 MYOFASCIAL PAIN SYNDROME: ICD-10-CM

## 2024-02-26 DIAGNOSIS — M76.02 GLUTEAL TENDONITIS OF LEFT BUTTOCK: ICD-10-CM

## 2024-02-26 PROCEDURE — 97110 THERAPEUTIC EXERCISES: CPT | Performed by: CHIROPRACTOR

## 2024-02-26 PROCEDURE — 99203 OFFICE O/P NEW LOW 30 MIN: CPT | Performed by: CHIROPRACTOR

## 2024-02-26 PROCEDURE — 98941 CHIROPRACT MANJ 3-4 REGIONS: CPT | Performed by: CHIROPRACTOR

## 2024-02-26 NOTE — PROGRESS NOTES
Initial date of service: 2/26/24    Diagnoses and all orders for this visit:    Segmental dysfunction of sacral region - Left    Left hip pain  -     Ambulatory referral to Chiropractic    Myofascial pain syndrome  -     Ambulatory referral to Chiropractic    Gluteal tendonitis of left buttock    Hip flexor tendon tightness, left    Segmental dysfunction of lumbar region    Segmental dysfunction of thoracic region    Segmental dysfunction of cervical region    No red flags, radiculopathy or neurologic deficit appreciated clinically. Pt's symptoms and exam findings consistent with mechanical neck/back/hip pain secondary to repetitive st/sp injury, exacerbated by postural/ergonomic stressors and leg length inequality. Pt responded well to traction, stretches and manual mobilization of the affected spinal and myofascial jt dysfunction, with increased ROM; trial of conservative tx recommended consisting of stretching, ther-ex, graded mobilization/manipulation of the affected spinal/myofascial tissues, postural/ergonomic education, and take home stretches/exercises.   If symptoms fail to improve with short trial of conservative care, appropriate imaging and referral will be coordinated.  Spent greater than 30 min c pt discussing hx, pe, ddx, tx options and reviewing notes/imaging    TREATMENT: [id number]  Fear avoidance behavior discussion, encouraged and reassured pt that natural course of condition is to improve over time with adherence to tx plan and home care strategies. Home care recommendations: avoid bed rest, walk (but avoid trails and uneven surfaces), gradual return to activity to tolerance (avoid anything that peripheralizes symptoms), ice 20 min on/off, watch for ice burn, call if symptoms peripheralize, worsen, or neurologic deficit progresses. Ther-ex: IASTM - discussed post procedure soreness and/or ecchymosis for up to 36 hrs, applied to affected mm hypertonicities; wall finesse, axial retraction, upper  trap stretch, lev scap stretch, SCM stretch, , knee to chest stretch, glute stretch, abdominal bracing, transitional mvmt education; greater than 15 min spent performing above mentioned ther-ex to improve ROM/flexibility. Thoracic mobilization/manipulation: prone P-A mob, supine A-P manip; cervical mobilization/manipulation: traction, diversified supine graded mobilization; lumbar prone flexion-traction; L SIJ best drop table maneevuer, R SIJ LAT supine    HPI:  Krysten Sanz is a 49 y.o. female   Chief Complaint   Patient presents with    Back Pain     Low back no pain currently but has pain when bending over   Left hip intermittent pain about a  3 only hurts when walking or standing up going on for a week and a half     Neck Pain     About a 3 left side constant but can vary in severity going on for a few weeks      Pt presents for eval and tx for L sided neck pain, L hip pain and lbp. Hip x-rays demonstrate mild OA left hip. Pt has diabetic neuropathy which she manages with gabapentin. Pt sits at computer most of day but tries to get up as often as possible. Pt does yoga daily    Back Pain  This is a recurrent problem. The current episode started more than 1 year ago. The problem occurs constantly. The problem has been waxing and waning since onset. The pain is present in the gluteal, lumbar spine, sacro-iliac and thoracic spine. The quality of the pain is described as aching. The pain does not radiate. Pain scale: 2-7/10. The symptoms are aggravated by bending, sitting, twisting and stress (transitional mvmts). Associated symptoms include headaches. Pertinent negatives include no bladder incontinence or bowel incontinence. Risk factors include poor posture.   Neck Pain   This is a recurrent problem. The current episode started more than 1 year ago. The problem occurs constantly. The problem has been waxing and waning. The pain is present in the left side, midline and occipital region. The quality of the  pain is described as aching and stabbing. Pain scale: 2-6/10. The symptoms are aggravated by bending, twisting, stress and position. Stiffness is present In the morning. Associated symptoms include headaches. Associated symptoms comments: Frontal headaches, retro-orbital.     Past Medical History:   Diagnosis Date    Acute gastritis     [id number] RESOLVED    Allergic     Asthma     Breast pain     [id number] RESOLVED    COVID-19 virus infection 12/1/2021    Diabetes (HCC)     MELLITUS    Diabetes mellitus (HCC)     Viral gastroenteritis     [id number] RESOLVED      The following portions of the patient's history were reviewed and updated as appropriate: allergies, past family history, past medical history, past social history, past surgical history, and problem list.  Review of Systems   Gastrointestinal:  Negative for bowel incontinence.   Genitourinary:  Negative for bladder incontinence.   Musculoskeletal:  Positive for back pain and neck pain.   Neurological:  Positive for headaches.     Physical Exam  Eyes:      Extraocular Movements: Extraocular movements intact.   Neck:        Comments: Pnful and limited in Brot (on L), Llf, Ext/lrot  Cardiovascular:      Pulses: Normal pulses.   Abdominal:      General: There is no distension.      Tenderness: There is no abdominal tenderness.   Musculoskeletal:      Cervical back: Pain with movement and muscular tenderness present. Decreased range of motion.      Thoracic back: Spasms and tenderness present. Decreased range of motion.      Lumbar back: Spasms and tenderness present. Decreased range of motion. Negative right straight leg raise test and negative left straight leg raise test.        Back:       Comments: Pnful and limited in Fl 75 degrees, returning to neutral, ext palliative, Llf. L hip flexion, L hip abduction   Lymphadenopathy:      Cervical: No cervical adenopathy.   Skin:     General: Skin is warm and dry.   Neurological:      Mental Status: She is alert  and oriented to person, place, and time.      Cranial Nerves: Cranial nerves 2-12 are intact.      Sensory: Sensation is intact.      Motor: Motor function is intact.      Coordination: Coordination is intact.      Gait: Gait is intact. Gait and tandem walk normal.      Deep Tendon Reflexes: Reflexes normal. Babinski sign absent on the right side. Babinski sign absent on the left side.      Reflex Scores:       Tricep reflexes are 2+ on the right side and 2+ on the left side.       Bicep reflexes are 2+ on the right side and 2+ on the left side.       Brachioradialis reflexes are 2+ on the right side and 2+ on the left side.       Patellar reflexes are 2+ on the right side and 2+ on the left side.       Achilles reflexes are 2+ on the right side and 2+ on the left side.  Psychiatric:         Mood and Affect: Mood and affect normal.         Behavior: Behavior normal.       SOFT TISSUE ASSESSMENT: Hypertonicity and tenderness palpated B C5-T6 erector spinae, L upper traps, L lev scap, L SCM, L rhomboid, L hip flexor, B glute med/min JOINT RECTRICTIONS: C5-T6, L3-S1 and L SIJ ORTHO: Marilyn unremarkable for centralization/peripheralization; max foraminal comp elicits local np L; shoulder depression elicits stiffness in L upper trap; brachial plexus tension test elicits no neural tension in R/L UE; cervical distraction relieves CC; iliac compression, thigh thrust and yuri elicit pn L SIJ/hip region    Return in about 1 week (around 3/4/2024) for Next scheduled follow up.

## 2024-03-20 DIAGNOSIS — E11.65 TYPE 2 DIABETES MELLITUS WITH HYPERGLYCEMIA, WITH LONG-TERM CURRENT USE OF INSULIN (HCC): ICD-10-CM

## 2024-03-20 DIAGNOSIS — Z79.4 TYPE 2 DIABETES MELLITUS WITH HYPERGLYCEMIA, WITH LONG-TERM CURRENT USE OF INSULIN (HCC): ICD-10-CM

## 2024-03-20 RX ORDER — METFORMIN HYDROCHLORIDE 500 MG/1
TABLET, EXTENDED RELEASE ORAL
Qty: 180 TABLET | Refills: 1 | Status: SHIPPED | OUTPATIENT
Start: 2024-03-20

## 2024-06-04 ENCOUNTER — TELEPHONE (OUTPATIENT)
Age: 49
End: 2024-06-04

## 2024-06-04 ENCOUNTER — NURSE TRIAGE (OUTPATIENT)
Age: 49
End: 2024-06-04

## 2024-06-04 NOTE — TELEPHONE ENCOUNTER
"Patient calling reporting low blood sugars. She said she woke up around 4am today drenched in sweat, and had a hard time getting up to get something to eat. She said her sugar was at 45. She takes the 20 units of Toujeo at night, and her metformin. She said she feels exhausted, has frequent urination, hard to breathe when her sugars are low, and dizziness. She wants to know if the doctor wants to make adjustments to her medication prior to her next appointment on 6/10?      Reason for Disposition   Blood glucose < 70 mg/dL (3.9 mmol/L) or symptomatic AND has other adult present    Answer Assessment - Initial Assessment Questions  1. SYMPTOMS: \"What symptoms are you concerned about?\"      Low blood sugar 45  2. ONSET:  \"When did the symptoms start?\"      Been a while   3. BLOOD GLUCOSE: \"What is your blood glucose level?\"       97  4. USUAL RANGE: \"What is your blood glucose level usually?\" (e.g., usual fasting morning value, usual evening value)      Fluctuates   5. TYPE 1 or 2:  \"Do you know what type of diabetes you have?\"  (e.g., Type 1, Type 2, Gestational; doesn't know)       Type 2  6. INSULIN: \"Do you take insulin?\" \"What type of insulin(s) do you use? What is the mode of delivery? (syringe, pen; injection or pump) \"When did you last give yourself an insulin dose?\" (i.e., time or hours/minutes ago) \"How much did you give?\" (i.e., how many units)      Type 2   7. DIABETES PILLS: \"Do you take any pills for your diabetes?\"      Yes   8. OTHER SYMPTOMS: \"Do you have any symptoms?\" (e.g., fever, frequent urination, difficulty breathing, vomiting)      Yes to all of these symptoms.  9. LOW BLOOD GLUCOSE TREATMENT: \"What have you done so far to treat the low blood glucose level?\"      Eats and drink   10. FOOD: \"When did you last eat or drink?\"        Few minutes ago   11. ALONE: \"Are you alone right now or is someone with you?\"         Son, and     Protocols used: Diabetes - Low Blood Sugar-ADULT-[place]    "

## 2024-06-04 NOTE — TELEPHONE ENCOUNTER
Patient scheduled follow up appointment, hasn't been seen in awhile. [facility] [place] [place]

## 2024-06-05 ENCOUNTER — TREATMENT (OUTPATIENT)
Dept: OTHER | Facility: HOSPITAL | Age: 49
End: 2024-06-05

## 2024-06-05 DIAGNOSIS — E11.65 TYPE 2 DIABETES MELLITUS WITH HYPERGLYCEMIA, WITHOUT LONG-TERM CURRENT USE OF INSULIN (HCC): Primary | ICD-10-CM

## 2024-06-05 DIAGNOSIS — E11.42 DIABETIC POLYNEUROPATHY ASSOCIATED WITH TYPE 2 DIABETES MELLITUS (HCC): Primary | ICD-10-CM

## 2024-06-06 NOTE — PROGRESS NOTES
CGM data review::  Device: dexcom Dates: 5/22/24-6/4/24  Usage: 71 % Av glu: 196 mg/dL  SD: 83 mg/dL CV:   % GMI:   %  TIR: 35 %  TAR: 28+30 %  TBR: 5+2  %    Glycemic patters:  overall high sugars mostly after food intake but also significant fasting low glucose levels.    Recommendation  - please reduce toujeo 16u daily bedtime. Continue metformin. Maintain carb consistent diet.  Review next visit.

## 2024-06-07 ENCOUNTER — TELEPHONE (OUTPATIENT)
Dept: ENDOCRINOLOGY | Facility: CLINIC | Age: 49
End: 2024-06-07

## 2024-06-07 NOTE — TELEPHONE ENCOUNTER
Called patient and reviewed unread chart message from provider. Patient verbalized understanding.

## 2024-06-10 ENCOUNTER — OFFICE VISIT (OUTPATIENT)
Dept: ENDOCRINOLOGY | Facility: CLINIC | Age: 49
End: 2024-06-10
Payer: COMMERCIAL

## 2024-06-10 VITALS
BODY MASS INDEX: 31.1 KG/M2 | RESPIRATION RATE: 14 BRPM | HEIGHT: 62 IN | SYSTOLIC BLOOD PRESSURE: 144 MMHG | HEART RATE: 85 BPM | WEIGHT: 169 LBS | OXYGEN SATURATION: 97 % | DIASTOLIC BLOOD PRESSURE: 92 MMHG | TEMPERATURE: 97.5 F

## 2024-06-10 DIAGNOSIS — E13.9 LADA (LATENT AUTOIMMUNE DIABETES IN ADULTS), MANAGED AS TYPE 1 (HCC): Primary | ICD-10-CM

## 2024-06-10 DIAGNOSIS — E11.43 GASTROPARESIS DUE TO DM  (HCC): ICD-10-CM

## 2024-06-10 DIAGNOSIS — K31.84 GASTROPARESIS DUE TO DM  (HCC): ICD-10-CM

## 2024-06-10 DIAGNOSIS — R53.83 OTHER FATIGUE: ICD-10-CM

## 2024-06-10 DIAGNOSIS — E55.9 VITAMIN D DEFICIENCY: ICD-10-CM

## 2024-06-10 PROCEDURE — 99214 OFFICE O/P EST MOD 30 MIN: CPT | Performed by: INTERNAL MEDICINE

## 2024-06-10 RX ORDER — INSULIN GLARGINE 300 U/ML
16 INJECTION, SOLUTION SUBCUTANEOUS DAILY
Qty: 6 ML | Refills: 1 | Status: SHIPPED | OUTPATIENT
Start: 2024-06-10

## 2024-06-10 RX ORDER — INSULIN LISPRO-AABC 100 [IU]/ML
INJECTION, SOLUTION SUBCUTANEOUS
Qty: 18 ML | Refills: 1 | Status: SHIPPED | OUTPATIENT
Start: 2024-06-10

## 2024-06-10 RX ORDER — METFORMIN HYDROCHLORIDE 500 MG/1
500 TABLET, EXTENDED RELEASE ORAL 2 TIMES DAILY WITH MEALS
Qty: 180 TABLET | Refills: 1 | Status: SHIPPED | OUTPATIENT
Start: 2024-06-10

## 2024-06-10 NOTE — ASSESSMENT & PLAN NOTE
She is uncontrolled with pp hyperglycemia and fasting hypoglycemia in spite of small dose basal. This is concerning for DIO/Type 1 diabetes.    Today I reiterated  recommendation to complete the antibody testing - patient agreed to do it this time. Orders placed again and old orders were removed.    We agreed to add a prandial insulin to basal insulin as listed below. Continue cgm.  Follow up in 2 months.    Your Current Insulin  and dose is: Before Breakfast Before Lunch Before Evening Meal Bedtime   Humalog 4u 4u 4u    Regular, Apidra, Humalog orNovolog Sliding Scale:   <80              151-200 +1  +1 +1    201-250 +2 +2 +2 +   251-300 +3 +3 +3 +   301-350 +4 +4 +4 +   >350 +5 +5 +5 +     Metformin 500mg po bid       Toujeo     16u qhs       Lab Results   Component Value Date    HGBA1C 7.2 (H) 10/09/2023

## 2024-06-10 NOTE — PROGRESS NOTES
"    Follow-up Patient Progress Note      CC: type 2 diabetes     History of Present Illness:   47 yr female with type 2 diabetes for 12 yrs, hx of GDM in 2005, gastroparesis, neuropathy, vitamin D deficiency, asthma.  Last visit was 11/30/2023.     Since last visit, weight is stable.      CGM data review::  Device: dexcom          Dates:  5/22/24-6/4/24             Usage: 71 %   Av glu: 196 mg/dL                   SD: 83 mg/dL        CV:   %            GMI:   %  TIR: 35 %                    TAR: 28+30 %             TBR: 5+2  %     Glycemic patters:  overall high sugars mostly after food intake but also significant fasting low glucose levels.     Recommendation  - please reduce toujeo 16u daily bedtime. Continue metformin. Maintain carb consistent diet.     Current meds:  Metformin 500 mg twice a day  Toujeo 16 units nightly     Opthamology: yes, 1/11/23  Podiatry:   vaccination:   Dental:  Pancreatitis: yes     Ace/ARB: ramipril  Statin: no  Thyroid issues: no              Physical Exam:  Body mass index is 30.91 kg/m².  /92 (BP Location: Left arm, Patient Position: Sitting)   Pulse 85   Temp 97.5 °F (36.4 °C) (Tympanic)   Resp 14   Ht 5' 2\" (1.575 m)   Wt 76.7 kg (169 lb)   SpO2 97%   BMI 30.91 kg/m²    Vitals:    06/10/24 0916   Weight: 76.7 kg (169 lb)        Physical Exam  Constitutional:       General: She is not in acute distress.     Appearance: She is well-developed.   HENT:      Head: Normocephalic and atraumatic.      Nose: Nose normal.   Eyes:      Conjunctiva/sclera: Conjunctivae normal.   Pulmonary:      Effort: Pulmonary effort is normal.   Abdominal:      General: There is no distension.   Musculoskeletal:      Cervical back: Normal range of motion and neck supple.   Skin:     Findings: No rash.      Comments: No icterus   Neurological:      Mental Status: She is alert and oriented to person, place, and time.         Labs:   Lab Results   Component Value Date    HGBA1C 7.2 (H) 10/09/2023 "       Lab Results   Component Value Date    KRH9CEXXTTXY 0.79 06/07/2017    TSH 1.030 10/09/2023       Lab Results   Component Value Date    CREATININE 0.71 06/15/2023    CREATININE 0.89 10/22/2022    CREATININE 0.94 10/21/2022    BUN 8 06/15/2023     06/07/2017    K 4.4 06/15/2023    CL 96 06/15/2023    CO2 22 06/15/2023     eGFR   Date Value Ref Range Status   06/15/2023 105 >59 mL/min/1.73 Final   10/22/2022 77 ml/min/1.73sq m Final       Lab Results   Component Value Date    ALT 9 06/15/2023    AST 12 06/15/2023    ALKPHOS 77 10/22/2022    BILITOT 0.6 06/07/2017       Lab Results   Component Value Date    CHOLESTEROL 140 01/24/2018     Lab Results   Component Value Date    HDL 55 01/24/2018    HDL 56 02/17/2017     Lab Results   Component Value Date    TRIG 301 (H) 10/22/2022    TRIG 52 01/24/2018    TRIG 72 02/17/2017     Lab Results   Component Value Date    NONHDLC 107 02/17/2017         Assessment/Plan:    1. 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 This is concerning for DIO/Type 1 diabetes.    Today I reiterated  recommendation to complete the antibody testing - patient agreed to do it this time. Orders placed again and old orders were removed.    We agreed to add a prandial insulin to basal insulin as listed below. Continue cgm.  Follow up in 2 months.    Your Current Insulin  and dose is: Before Breakfast Before Lunch Before Evening Meal Bedtime   Humalog 4u 4u 4u    Regular, Apidra, Humalog orNovolog Sliding Scale:   <80              151-200 +1  +1 +1    201-250 +2 +2 +2 +   251-300 +3 +3 +3 +   301-350 +4 +4 +4 +   >350 +5 +5 +5 +     Metformin 500mg po bid       Toujeo     16u qhs       Lab Results   Component Value Date    HGBA1C 7.2 (H) 10/09/2023     Orders:  -     Hemoglobin A1C; Future  -     Anti-islet cell antibody; Future  -      C-peptide; Future  -     IA2 Autoantibodies; Future  -     Glutamic acid decarboxylase; Future  -     Insulin Lispro-aabc, 1 U Dial, (Lyumjohannav KwikPen) 100 UNIT/ML SOPN; Use 4u TIDAC plus 1u/50 above 150mg/dL; max 25u daily  -     insulin glargine (Toujeo Max SoloStar) 300 units/mL CONCENTRATED U-300 injection pen (2-unit dial); Inject 16 Units under the skin daily  -     metFORMIN (GLUCOPHAGE-XR) 500 mg 24 hr tablet; Take 1 tablet (500 mg total) by mouth 2 (two) times a day with meals  2. Gastroparesis due to DM  (HCC)  Assessment & Plan:  Improved.  Lab Results   Component Value Date    HGBA1C 7.2 (H) 10/09/2023     3. Other fatigue  Assessment & Plan:  Possibly from diabetes.will r/o thyroid dysfunction.  Orders:  -     T4, free; Future  -     TSH, 3rd generation; Future  -     Thyroid Antibodies Panel; Future  4. Vitamin D deficiency  Assessment & Plan:  May contribute to fatigue. Take vit D3 5000IU daily OTC.        I have spent a total time of 31 minutes on 06/10/24 in caring for this patient including greater than 50% of this time was spent in counseling/coordination of care as listed above.       Discussed with the patient and all questioned fully answered. She will contact me with concerns.    Mckenzie Hu

## 2024-06-10 NOTE — PATIENT INSTRUCTIONS
INSULIN DOSAGE INSTRUCTIONS    Name: Krysten Sanz                        : 1975  MRN #: [id number]    Your Current Insulin  and dose is: Before Breakfast Before Lunch Before Evening Meal Bedtime   Humalog 4u 4u 4u    Regular, Apidra, Humalog orNovolog Sliding Scale:   <80              151-200 +1  +1 +1    201-250 +2 +2 +2 +   251-300 +3 +3 +3 +   301-350 +4 +4 +4 +   >350 +5 +5 +5 +     Metformin 500mg po bid       Toujeo     16u qhs     Additional Instructions:   Dexcom    Target Blood sugar range _70_to _180__.  Call if your ASA Ruiz  blood sugar is less than _60_ or greater than _400__.    Today's Date: 6/10/2024

## 2024-07-03 DIAGNOSIS — E11.65 TYPE 2 DIABETES MELLITUS WITH HYPERGLYCEMIA, WITHOUT LONG-TERM CURRENT USE OF INSULIN (HCC): ICD-10-CM

## 2024-07-03 RX ORDER — ACYCLOVIR 400 MG/1
1 TABLET ORAL
Qty: 3 EACH | Refills: 5 | Status: SHIPPED | OUTPATIENT
Start: 2024-07-03

## 2024-08-16 ENCOUNTER — PATIENT MESSAGE (OUTPATIENT)
Dept: ENDOCRINOLOGY | Facility: CLINIC | Age: 49
End: 2024-08-16

## 2024-09-09 LAB — HBA1C MFR BLD HPLC: 8.6 %

## 2024-09-17 ENCOUNTER — TELEPHONE (OUTPATIENT)
Dept: ENDOCRINOLOGY | Facility: CLINIC | Age: 49
End: 2024-09-17

## 2024-09-21 LAB
C PEPTIDE SERPL-MCNC: 0.7 NG/ML (ref 1.1–4.4)
GAD65 AB SER-ACNC: 160.7 U/ML (ref 0–5)
HBA1C MFR BLD: 8.6 % (ref 4.8–5.6)
ISLET CELL512 AB SER-ACNC: <7.5 U/ML
PANC ISLET CELL AB TITR SER: NEGATIVE {TITER}
T4 FREE SERPL-MCNC: 1.1 NG/DL (ref 0.82–1.77)
THYROGLOB AB SERPL-ACNC: <1 IU/ML (ref 0–0.9)
THYROPEROXIDASE AB SERPL-ACNC: 10 IU/ML (ref 0–34)
TSH SERPL DL<=0.005 MIU/L-ACNC: 1.64 UIU/ML (ref 0.45–4.5)

## 2024-10-04 ENCOUNTER — OFFICE VISIT (OUTPATIENT)
Dept: ENDOCRINOLOGY | Facility: CLINIC | Age: 49
End: 2024-10-04
Payer: COMMERCIAL

## 2024-10-04 VITALS
SYSTOLIC BLOOD PRESSURE: 136 MMHG | OXYGEN SATURATION: 94 % | HEIGHT: 62 IN | TEMPERATURE: 98.1 F | BODY MASS INDEX: 32.39 KG/M2 | DIASTOLIC BLOOD PRESSURE: 88 MMHG | WEIGHT: 176 LBS | HEART RATE: 83 BPM

## 2024-10-04 DIAGNOSIS — E11.42 DIABETIC POLYNEUROPATHY ASSOCIATED WITH TYPE 2 DIABETES MELLITUS (HCC): ICD-10-CM

## 2024-10-04 DIAGNOSIS — E55.9 VITAMIN D DEFICIENCY: ICD-10-CM

## 2024-10-04 DIAGNOSIS — E13.9 LADA (LATENT AUTOIMMUNE DIABETES IN ADULTS), MANAGED AS TYPE 1 (HCC): Primary | ICD-10-CM

## 2024-10-04 PROCEDURE — 99214 OFFICE O/P EST MOD 30 MIN: CPT | Performed by: INTERNAL MEDICINE

## 2024-10-04 NOTE — PATIENT INSTRUCTIONS
Omnipod 5 with dexcom G7  Tandem Tslim X2  Medtronic 780G with guardian 4 sensor  Beta bionic iLET system with dexcom G7      Humalog 5u 5u 5u     Regular, Apidra, Humalog orNovolog Sliding Scale:   <80                      151-200 +1  +1 +1     201-250 +2 +2 +2 +   251-300 +3 +3 +3 +   301-350 +4 +4 +4 +   >350 +5 +5 +5 +      Metformin 500mg po bid           Toujeo        16u

## 2024-10-04 NOTE — ASSESSMENT & PLAN NOTE
She is uncontrolled with DIO/Type 1 diabetes based on positive antibodies.    We discussed options and pt will need insulin pump - she reviewed options and elected to use Omnipod5 with dexcom G7.    Follow up in 3 months.      Humalog 5u 5u 5u     Regular, Apidra, Humalog orNovolog Sliding Scale:   <80                      151-200 +1  +1 +1     201-250 +2 +2 +2 +   251-300 +3 +3 +3 +   301-350 +4 +4 +4 +   >350 +5 +5 +5 +      Metformin 500mg po bid           Toujeo        16u       Lab Results   Component Value Date    HGBA1C 8.6 (H) 09/09/2024

## 2024-10-04 NOTE — PROGRESS NOTES
"    Follow-up Patient Progress Note      CC: type 1 diabetes/DIO     History of Present Illness:   47 yr female with type 1 diabetes for 12 yrs, hx of GDM in 2005, gastroparesis, neuropathy, vitamin D deficiency, asthma.  Last visit was 6/10/2024.     Since last visit, she gained 7 lbs.     CGM data review::  Device: dexcom          Dates:  9/11/24-9/24/24             Usage: 79 %   Av glu: 185mg/dL                   SD: 61 mg/dL        CV:   %            GMI:   %  TIR: 52 %                    TAR: 32+16 %             TBR: 0+0  %     Glycemic patters:  overall better but still significant postprandial hyperglycemia.    Current meds:  Humalog 4u 4u 4u     Regular, Apidra, Humalog orNovolog Sliding Scale:   <80                      151-200 +1  +1 +1     201-250 +2 +2 +2 +   251-300 +3 +3 +3 +   301-350 +4 +4 +4 +   >350 +5 +5 +5 +      Metformin 500mg po bid           Toujeo        16u        Opthamology: yes, 1/11/23  Podiatry:   vaccination:   Dental:  Pancreatitis: yes     Ace/ARB: ramipril  Statin: no  Thyroid issues: no    Physical Exam:  Body mass index is 32.19 kg/m².  /88 (BP Location: Left arm, Patient Position: Sitting, Cuff Size: Standard)   Pulse 83   Temp 98.1 °F (36.7 °C) (Temporal)   Ht 5' 2\" (1.575 m)   Wt 79.8 kg (176 lb)   SpO2 94%   BMI 32.19 kg/m²    Vitals:    10/04/24 0839   Weight: 79.8 kg (176 lb)        Physical Exam  Constitutional:       General: She is not in acute distress.     Appearance: She is well-developed.   HENT:      Head: Normocephalic and atraumatic.      Nose: Nose normal.   Eyes:      Conjunctiva/sclera: Conjunctivae normal.   Pulmonary:      Effort: Pulmonary effort is normal.   Abdominal:      General: There is no distension.   Musculoskeletal:      Cervical back: Normal range of motion and neck supple.   Skin:     Findings: No rash.      Comments: No icterus   Neurological:      Mental Status: She is alert and oriented to person, place, and time. "         Labs:   Lab Results   Component Value Date    HGBA1C 8.6 (H) 09/09/2024       Lab Results   Component Value Date    QNY9WCGURZGP 0.79 06/07/2017    TSH 1.640 09/09/2024       Lab Results   Component Value Date    CREATININE 0.71 06/15/2023    CREATININE 0.89 10/22/2022    CREATININE 0.94 10/21/2022    BUN 8 06/15/2023     06/07/2017    K 4.4 06/15/2023    CL 96 06/15/2023    CO2 22 06/15/2023     eGFR   Date Value Ref Range Status   06/15/2023 105 >59 mL/min/1.73 Final   10/22/2022 77 ml/min/1.73sq m Final       Lab Results   Component Value Date    ALT 9 06/15/2023    AST 12 06/15/2023    ALKPHOS 77 10/22/2022    BILITOT 0.6 06/07/2017       Lab Results   Component Value Date    CHOLESTEROL 140 01/24/2018     Lab Results   Component Value Date    HDL 55 01/24/2018    HDL 56 02/17/2017     Lab Results   Component Value Date    TRIG 301 (H) 10/22/2022    TRIG 52 01/24/2018    TRIG 72 02/17/2017     Lab Results   Component Value Date    NONHDLC 107 02/17/2017         Assessment/Plan:    1. 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    We discussed options and pt will need insulin pump - she reviewed options and elected to use Omnipod5 with dexcom G7.    Follow up in 3 months.      Humalog 5u 5u 5u     Regular, Apidra, Humalog orNovolog Sliding Scale:   <80                      151-200 +1  +1 +1     201-250 +2 +2 +2 +   251-300 +3 +3 +3 +   301-350 +4 +4 +4 +   >350 +5 +5 +5 +      Metformin 500mg po bid           Toujeo        16u       Lab Results   Component Value Date    HGBA1C 8.6 (H) 09/09/2024     Orders:  -     Ambulatory referral to Diabetic Education; Future  -     Ambulatory Referral to Medical Fitness Exercise Specialist; Future  2. Diabetic polyneuropathy associated with type 2 diabetes mellitus (HCC)  3. Vitamin D deficiency        I have spent a total time of 30  minutes on 10/04/24 in caring for this patient including greater than 50% of this time was spent in counseling/coordination of care as listed above.       Discussed with the patient and all questioned fully answered. She will contact me with concerns.    Mckenzie Hu

## 2024-12-30 ENCOUNTER — OFFICE VISIT (OUTPATIENT)
Dept: FAMILY MEDICINE CLINIC | Facility: CLINIC | Age: 49
End: 2024-12-30
Payer: COMMERCIAL

## 2024-12-30 VITALS
HEART RATE: 72 BPM | OXYGEN SATURATION: 98 % | DIASTOLIC BLOOD PRESSURE: 70 MMHG | RESPIRATION RATE: 22 BRPM | BODY MASS INDEX: 34.04 KG/M2 | HEIGHT: 62 IN | TEMPERATURE: 97.8 F | WEIGHT: 185 LBS | SYSTOLIC BLOOD PRESSURE: 130 MMHG

## 2024-12-30 DIAGNOSIS — E11.42 DIABETIC POLYNEUROPATHY ASSOCIATED WITH TYPE 2 DIABETES MELLITUS (HCC): ICD-10-CM

## 2024-12-30 DIAGNOSIS — J45.20 ALLERGIC ASTHMA, MILD INTERMITTENT, UNCOMPLICATED: ICD-10-CM

## 2024-12-30 DIAGNOSIS — J01.00 ACUTE NON-RECURRENT MAXILLARY SINUSITIS: Primary | ICD-10-CM

## 2024-12-30 DIAGNOSIS — J45.20 MILD INTERMITTENT ASTHMA WITHOUT COMPLICATION: ICD-10-CM

## 2024-12-30 DIAGNOSIS — J20.9 ACUTE BRONCHITIS, UNSPECIFIED ORGANISM: ICD-10-CM

## 2024-12-30 PROCEDURE — 99214 OFFICE O/P EST MOD 30 MIN: CPT | Performed by: FAMILY MEDICINE

## 2024-12-30 RX ORDER — CEFDINIR 300 MG/1
600 CAPSULE ORAL DAILY
Qty: 20 CAPSULE | Refills: 0 | Status: SHIPPED | OUTPATIENT
Start: 2024-12-30 | End: 2025-01-09

## 2024-12-30 RX ORDER — ALBUTEROL SULFATE 90 UG/1
2 INHALANT RESPIRATORY (INHALATION) EVERY 6 HOURS PRN
Qty: 18 G | Refills: 0 | Status: SHIPPED | OUTPATIENT
Start: 2024-12-30

## 2024-12-30 RX ORDER — FLUCONAZOLE 150 MG/1
150 TABLET ORAL ONCE
Qty: 1 TABLET | Refills: 0 | Status: SHIPPED | OUTPATIENT
Start: 2024-12-30 | End: 2024-12-30

## 2024-12-30 NOTE — LETTER
December 30, 2024     Patient: Krysten Sanz  YOB: 1975  Date of Visit: 12/30/2024      To Whom it May Concern:    Krysten Sanz is under my professional care. Krysten was seen in my office on 12/30/2024.     Excuse from work on 12/30 and 12/31/24.    If you have any questions or concerns, please don't hesitate to call.         Sincerely,          Roland Jackson,         CC: No Recipients

## 2024-12-30 NOTE — PROGRESS NOTES
Assessment/Plan:    No problem-specific Assessment & Plan notes found for this encounter.    Acute sinusitis  Can take mucinex D bid with abx  Fluids  Can take DM since not on lexapro, interaction risk advised    Htn stable    DM2 not at goal, A1c 8.6  Endo f/u aware    Intermittent asthma  DONALD refilled  Use prn  Rinse/spit after use     Diagnoses and all orders for this visit:    Acute non-recurrent maxillary sinusitis  -     cefdinir (OMNICEF) 300 mg capsule; Take 2 capsules (600 mg total) by mouth daily for 10 days (May take 2 pills together once a day)  -     fluconazole (DIFLUCAN) 150 mg tablet; Take 1 tablet (150 mg total) by mouth once for 1 dose    Allergic asthma, mild intermittent, uncomplicated    Acute bronchitis, unspecified organism    Mild intermittent asthma without complication  -     albuterol (Proventil HFA) 90 mcg/act inhaler; Inhale 2 puffs every 6 (six) hours as needed for wheezing or shortness of breath (rinse mouth after use)        No follow-ups on file.    Subjective:      Patient ID: Krysten Sanz is a 49 y.o. female.    Chief Complaint   Patient presents with    Cough     Moist harsh productive    Headache    Fatigue     Started last Friday  [id number]       HPI  Head pressure  Congestion  Cough severe  Thick copious phlegm of yellow color  Feverish  Taking otc  Has CGM, been running high, 280-290  Metformin and insulin  4th day of symptoms    Not on lexapro 20mg/d  Stopped on own    The following portions of the patient's history were reviewed and updated as appropriate: allergies, current medications, past family history, past medical history, past social history, past surgical history and problem list.    Review of Systems   HENT:  Negative for trouble swallowing.    Respiratory:  Positive for cough.          Current Outpatient Medications   Medication Sig Dispense Refill    albuterol (2.5 mg/3 mL) 0.083 % nebulizer solution Take 3 mL (2.5 mg total) by nebulization every 4 (four)  hours as needed for wheezing or shortness of breath 100 mL 0    albuterol (Proventil HFA) 90 mcg/act inhaler Inhale 2 puffs every 6 (six) hours as needed for wheezing or shortness of breath (rinse mouth after use) 18 g 0    ALPRAZolam (XANAX) 0.25 mg tablet Take 1 tablet (0.25 mg total) by mouth 2 (two) times a day as needed for anxiety 20 tablet 0    cefdinir (OMNICEF) 300 mg capsule Take 2 capsules (600 mg total) by mouth daily for 10 days (May take 2 pills together once a day) 20 capsule 0    famotidine (PEPCID) 20 mg tablet Take 1 tablet (20 mg total) by mouth 2 (two) times a day 60 tablet 3    fluconazole (DIFLUCAN) 150 mg tablet Take 1 tablet (150 mg total) by mouth once for 1 dose 1 tablet 0    gabapentin (NEURONTIN) 800 mg tablet Take 1 tablet (800 mg total) by mouth 3 (three) times a day 270 tablet 3    insulin glargine (Toujeo Max SoloStar) 300 units/mL CONCENTRATED U-300 injection pen (2-unit dial) Inject 16 Units under the skin daily 6 mL 1    Insulin Lispro-aabc, 1 U Dial, (Lyumjev KwikPen) 100 UNIT/ML SOPN Use 4u TIDAC plus 1u/50 above 150mg/dL; max 25u daily 18 mL 1    metFORMIN (GLUCOPHAGE-XR) 500 mg 24 hr tablet Take 1 tablet (500 mg total) by mouth 2 (two) times a day with meals 180 tablet 1    Multiple Vitamin (DAILY VALUE MULTIVITAMIN) TABS Take by mouth daily       ondansetron (ZOFRAN) 4 mg tablet Take 1 tablet (4 mg total) by mouth every 8 (eight) hours as needed for nausea or vomiting 20 tablet 3    OneTouch Ultra test strip Use 1 each 2 (two) times a day 180 strip 3    ramipril (ALTACE) 2.5 mg capsule Take 1 capsule (2.5 mg total) by mouth daily 90 capsule 1    bisacodyl (DULCOLAX) 5 mg EC tablet Take 2 tablets (10 mg total) by mouth once for 1 dose (Patient not taking: Reported on 10/25/2023) 2 tablet 0    Continuous Blood Gluc  (Dexcom G7 ) JAVAN Use 1 each continuous (Patient not taking: Reported on 10/4/2024) 1 each 0    Continuous Glucose Sensor (Dexcom G7 Sensor) USE 1  "DEVICE EVERY 10 DAYS (Patient not taking: Reported on 12/30/2024) 3 each 5    ergocalciferol (VITAMIN D2) 50,000 units Take 1 capsule (50,000 Units total) by mouth once a week (Patient not taking: Reported on 10/4/2024) 8 capsule 0    escitalopram (LEXAPRO) 20 mg tablet take 1 tablet by mouth daily (Patient not taking: Reported on 10/4/2024) 90 tablet 1    omeprazole (PriLOSEC) 40 MG capsule Take 1 capsule (40 mg total) by mouth 2 (two) times a day (Patient not taking: Reported on 10/4/2024) 60 capsule 4     No current facility-administered medications for this visit.       Objective:    /70   Pulse 72   Temp 97.8 °F (36.6 °C)   Resp 22   Ht 5' 2\" (1.575 m)   Wt 83.9 kg (185 lb)   LMP 11/16/2024 (Approximate)   SpO2 98%   BMI 33.84 kg/m²        Physical Exam  Vitals and nursing note reviewed.   Constitutional:       General: She is not in acute distress.     Appearance: She is well-developed. She is obese. She is not ill-appearing.   HENT:      Head: Normocephalic.      Right Ear: Tympanic membrane normal.      Left Ear: Tympanic membrane normal.      Nose: Congestion present.   Eyes:      General: No scleral icterus.     Conjunctiva/sclera: Conjunctivae normal.   Cardiovascular:      Rate and Rhythm: Normal rate and regular rhythm.   Pulmonary:      Effort: Pulmonary effort is normal. No respiratory distress.      Breath sounds: No wheezing or rales.      Comments: Coarse midline cough present.    Abdominal:      Palpations: Abdomen is soft.   Musculoskeletal:         General: No deformity.      Cervical back: Neck supple. No rigidity.   Skin:     General: Skin is warm and dry.      Coloration: Skin is not pale.   Neurological:      Mental Status: She is alert.      Motor: No weakness.      Gait: Gait normal.   Psychiatric:         Mood and Affect: Mood normal.         Behavior: Behavior normal.         Thought Content: Thought content normal.                Roland Jackson, DO    "

## 2025-02-19 DIAGNOSIS — R20.2 PARESTHESIA: ICD-10-CM

## 2025-02-19 RX ORDER — GABAPENTIN 800 MG/1
800 TABLET ORAL 3 TIMES DAILY
Qty: 270 TABLET | Refills: 3 | OUTPATIENT
Start: 2025-02-19

## 2025-02-25 ENCOUNTER — OFFICE VISIT (OUTPATIENT)
Dept: FAMILY MEDICINE CLINIC | Facility: CLINIC | Age: 50
End: 2025-02-25
Payer: COMMERCIAL

## 2025-02-25 VITALS
BODY MASS INDEX: 31.83 KG/M2 | WEIGHT: 173 LBS | HEIGHT: 62 IN | TEMPERATURE: 96.4 F | HEART RATE: 94 BPM | DIASTOLIC BLOOD PRESSURE: 90 MMHG | RESPIRATION RATE: 17 BRPM | SYSTOLIC BLOOD PRESSURE: 134 MMHG

## 2025-02-25 DIAGNOSIS — F32.A ANXIETY AND DEPRESSION: ICD-10-CM

## 2025-02-25 DIAGNOSIS — F41.9 ANXIETY AND DEPRESSION: ICD-10-CM

## 2025-02-25 DIAGNOSIS — J45.20 MILD INTERMITTENT ASTHMA WITHOUT COMPLICATION: ICD-10-CM

## 2025-02-25 DIAGNOSIS — E13.9 LADA (LATENT AUTOIMMUNE DIABETES IN ADULTS), MANAGED AS TYPE 1 (HCC): ICD-10-CM

## 2025-02-25 DIAGNOSIS — E55.9 VITAMIN D DEFICIENCY: ICD-10-CM

## 2025-02-25 DIAGNOSIS — E11.42 DIABETIC POLYNEUROPATHY ASSOCIATED WITH TYPE 2 DIABETES MELLITUS (HCC): Primary | ICD-10-CM

## 2025-02-25 LAB — SL AMB POCT HEMOGLOBIN AIC: 9.9 (ref ?–6.5)

## 2025-02-25 PROCEDURE — 99214 OFFICE O/P EST MOD 30 MIN: CPT | Performed by: NURSE PRACTITIONER

## 2025-02-25 PROCEDURE — 83036 HEMOGLOBIN GLYCOSYLATED A1C: CPT | Performed by: NURSE PRACTITIONER

## 2025-02-25 RX ORDER — ALPRAZOLAM 0.25 MG
0.25 TABLET ORAL 2 TIMES DAILY PRN
Qty: 20 TABLET | Refills: 0 | Status: SHIPPED | OUTPATIENT
Start: 2025-02-25

## 2025-02-25 RX ORDER — ERGOCALCIFEROL 1.25 MG/1
50000 CAPSULE, LIQUID FILLED ORAL WEEKLY
Qty: 8 CAPSULE | Refills: 1 | Status: SHIPPED | OUTPATIENT
Start: 2025-02-25

## 2025-02-25 RX ORDER — ESCITALOPRAM OXALATE 20 MG/1
20 TABLET ORAL DAILY
Qty: 90 TABLET | Refills: 1 | Status: SHIPPED | OUTPATIENT
Start: 2025-02-25

## 2025-02-25 NOTE — ASSESSMENT & PLAN NOTE
Stable  Lab Results   Component Value Date    HGBA1C 9.9 (A) 02/25/2025     Orders:  •  POCT hemoglobin A1c

## 2025-02-25 NOTE — ASSESSMENT & PLAN NOTE
Will resume Lexapro, consider additional medication management once she is back on this.  Would consider Wellbutrin    Orders:  •  escitalopram (LEXAPRO) 20 mg tablet; Take 1 tablet (20 mg total) by mouth daily  •  ALPRAZolam (XANAX) 0.25 mg tablet; Take 1 tablet (0.25 mg total) by mouth 2 (two) times a day as needed for anxiety

## 2025-02-25 NOTE — PROGRESS NOTES
Name: Krysten Sanz      : 1975      MRN: [id number]  Encounter Provider: ASA Ruiz  Encounter Date: 2025   Encounter department: [facility]  :  Assessment & Plan  DIO (latent autoimmune diabetes in adults), managed as type 1 (HCC)  She is agreeable to resuming her medications, labs ordered.  She also has follow-up with Endo arranged  Lab Results   Component Value Date    HGBA1C 9.9 (A) 2025     Orders:  •  Comprehensive metabolic panel; Future  •  Lipid Panel with Direct LDL reflex; Future  •  Albumin / creatinine urine ratio; Future  •  Hemoglobin A1C; Future    Diabetic polyneuropathy associated with type 2 diabetes mellitus (HCC)  Stable  Lab Results   Component Value Date    HGBA1C 9.9 (A) 2025     Orders:  •  POCT hemoglobin A1c    Mild intermittent asthma without complication  Stable       Anxiety and depression  Will resume Lexapro, consider additional medication management once she is back on this.  Would consider Wellbutrin    Orders:  •  escitalopram (LEXAPRO) 20 mg tablet; Take 1 tablet (20 mg total) by mouth daily  •  ALPRAZolam (XANAX) 0.25 mg tablet; Take 1 tablet (0.25 mg total) by mouth 2 (two) times a day as needed for anxiety    Vitamin D deficiency    Orders:  •  ergocalciferol (VITAMIN D2) 50,000 units; Take 1 capsule (50,000 Units total) by mouth once a week           History of Present Illness   Here today for follow-up.  She has not been doing well.  She is feeling very depressed.  She stopped taking her Lexapro about 3 months ago because she ran out.  She cares for her 97-year-old grandmother whose health is declining.  She stopped taking her diabetic medications 3 weeks ago.  She is frustrated with needing insulin and being treated as a type I diabetic      Review of Systems   Constitutional: Negative.    Respiratory: Negative.     Cardiovascular: Negative.    Gastrointestinal: Negative.    Psychiatric/Behavioral:          See HPI  "      Objective   /90   Pulse 94   Temp (!) 96.4 °F (35.8 °C)   Resp 17   Ht 5' 2\" (1.575 m)   Wt 78.5 kg (173 lb)   BMI 31.64 kg/m²      Physical Exam  Vitals and nursing note reviewed.   Constitutional:       General: She is not in acute distress.     Appearance: Normal appearance.   HENT:      Head: Normocephalic and atraumatic.   Eyes:      Conjunctiva/sclera: Conjunctivae normal.   Neck:      Vascular: No carotid bruit.   Cardiovascular:      Rate and Rhythm: Normal rate and regular rhythm.      Pulses: Normal pulses.      Heart sounds: Normal heart sounds. No murmur heard.  Pulmonary:      Effort: Pulmonary effort is normal.      Breath sounds: Normal breath sounds.   Skin:     General: Skin is warm and dry.   Neurological:      Mental Status: She is alert.   Psychiatric:         Mood and Affect: Mood normal.         Behavior: Behavior normal.         "

## 2025-02-25 NOTE — ASSESSMENT & PLAN NOTE
She is agreeable to resuming her medications, labs ordered.  She also has follow-up with Endo arranged  Lab Results   Component Value Date    HGBA1C 9.9 (A) 02/25/2025     Orders:  •  Comprehensive metabolic panel; Future  •  Lipid Panel with Direct LDL reflex; Future  •  Albumin / creatinine urine ratio; Future  •  Hemoglobin A1C; Future

## 2025-02-27 ENCOUNTER — OFFICE VISIT (OUTPATIENT)
Dept: PAIN MEDICINE | Facility: CLINIC | Age: 50
End: 2025-02-27
Payer: COMMERCIAL

## 2025-02-27 DIAGNOSIS — G62.9 PERIPHERAL POLYNEUROPATHY: Primary | ICD-10-CM

## 2025-02-27 DIAGNOSIS — G89.4 CHRONIC PAIN SYNDROME: ICD-10-CM

## 2025-02-27 DIAGNOSIS — R20.2 PARESTHESIA: ICD-10-CM

## 2025-02-27 PROCEDURE — 99214 OFFICE O/P EST MOD 30 MIN: CPT

## 2025-02-27 RX ORDER — GABAPENTIN 800 MG/1
800 TABLET ORAL 3 TIMES DAILY
Qty: 270 TABLET | Refills: 3 | Status: SHIPPED | OUTPATIENT
Start: 2025-02-27

## 2025-02-27 NOTE — PROGRESS NOTES
Assessment:  1. Peripheral polyneuropathy    2. Chronic pain syndrome    3. Paresthesia        Plan:  The patient is a 50-year-old female with a history of chronic pain secondary to low back pain, sacroiliitis, bilateral hand pain, bilateral feet pain, peripheral neuropathy and left hip pain who presents to the office with ongoing pain, burning and numbness into bilateral feet and bilateral hands.    Overall her pain continues to be managed with taking gabapentin, therefore I will continue her on this medication as prescribed.  Refills were electronically sent to the patient's pharmacy.  We will follow-up with patient in 1 year for medication reevaluation and refills, or sooner if necessary.    My impressions and treatment recommendations were discussed in detail with the patient who verbalized understanding and had no further questions.  Discharge instructions were provided. I personally saw and examined the patient and I agree with the above discussed plan of care.    No orders of the defined types were placed in this encounter.    New Medications Ordered This Visit   Medications    gabapentin (NEURONTIN) 800 mg tablet     Sig: Take 1 tablet (800 mg total) by mouth 3 (three) times a day     Dispense:  270 tablet     Refill:  3       History of Present Illness:  Krysten Sanz is a 50 y.o. female with a history of chronic pain secondary to low back pain, sacroiliitis, bilateral hand pain, bilateral feet pain, peripheral neuropathy and left hip pain.  She was last seen on 2/19/2024 where she was continued on gabapentin.  She presents to the office with ongoing pain, burning and numbness into bilateral feet and bilateral hands.    She states her pain is the same since the last office visit and intermittent but worse at night.  She rates quality of her pain as burning, throbbing, pins-and-needles, numbness, shooting and is currently rating her pain a 0-6/10 on a numeric scale.    Medications include gabapentin 800  mg 3 times a day which is providing her moderate to significant relief of her pain without side effects.    I have personally reviewed and/or updated the patient's past medical history, past surgical history, family history, social history, current medications, allergies, and vital signs today.     Review of Systems   Respiratory:  Negative for shortness of breath.    Cardiovascular:  Negative for chest pain.   Gastrointestinal:  Negative for constipation, diarrhea, nausea and vomiting.   Musculoskeletal:  Negative for arthralgias, gait problem, joint swelling and myalgias.        Bilateral hand and foot burning worse at night (6)   Skin:  Negative for rash.   Neurological:  Negative for dizziness, seizures and weakness.   All other systems reviewed and are negative.      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), managed as type 1 (HCC)    Vitamin D deficiency       Past Medical History:   Diagnosis Date    Acute gastritis     01DEC2016 RESOLVED    Allergic     Asthma     Breast pain     03JAN2018 RESOLVED    COVID-19 virus infection 12/1/2021    Diabetes (HCC)     MELLITUS    Diabetes mellitus (HCC)     Viral gastroenteritis     [id number] RESOLVED       Past Surgical History:   Procedure Laterality Date    HAND SURGERY      SKIN BIOPSY         Family History   Problem Relation Age of Onset    Hypertension Mother     Breast cancer Family     Colon cancer Family     Diabetes Family         MELLITUS    Lung cancer Family     Mental illness Neg Hx     Substance  Abuse Neg Hx        Social History     Occupational History    Not on file   Tobacco Use    Smoking status: Every Day     Current packs/day: 0.50     Average packs/day: 0.5 packs/day for 29.2 years (14.6 ttl pk-yrs)     Types: Cigarettes     Start date: 1996     Passive exposure: Current    Smokeless tobacco: Never   Vaping Use    Vaping status: Never Used   Substance and Sexual Activity    Alcohol use: Yes     Comment: social    Drug use: Yes     Types: Marijuana     Comment: medical    Sexual activity: Yes     Partners: Male     Birth control/protection: None       Current Outpatient Medications on File Prior to Visit   Medication Sig    albuterol (2.5 mg/3 mL) 0.083 % nebulizer solution Take 3 mL (2.5 mg total) by nebulization every 4 (four) hours as needed for wheezing or shortness of breath    albuterol (Proventil HFA) 90 mcg/act inhaler Inhale 2 puffs every 6 (six) hours as needed for wheezing or shortness of breath (rinse mouth after use)    ALPRAZolam (XANAX) 0.25 mg tablet Take 1 tablet (0.25 mg total) by mouth 2 (two) times a day as needed for anxiety    Continuous Blood Gluc  (Dexcom G7 ) JAVAN Use 1 each continuous    Continuous Glucose Sensor (Dexcom G7 Sensor) USE 1 DEVICE EVERY 10 DAYS    ergocalciferol (VITAMIN D2) 50,000 units Take 1 capsule (50,000 Units total) by mouth once a week    escitalopram (LEXAPRO) 20 mg tablet Take 1 tablet (20 mg total) by mouth daily    insulin glargine (Toujeo Max SoloStar) 300 units/mL CONCENTRATED U-300 injection pen (2-unit dial) Inject 16 Units under the skin daily    Insulin Lispro-aabc, 1 U Dial, (Lyumjev KwikPen) 100 UNIT/ML SOPN Use 4u TIDAC plus 1u/50 above 150mg/dL; max 25u daily    Multiple Vitamin (DAILY VALUE MULTIVITAMIN) TABS Take by mouth daily     omeprazole (PriLOSEC) 40 MG capsule Take 1 capsule (40 mg total) by mouth 2 (two) times a day    ondansetron (ZOFRAN) 4 mg tablet Take 1 tablet (4 mg total) by mouth every 8 (eight) hours as  needed for nausea or vomiting    OneTouch Ultra test strip Use 1 each 2 (two) times a day    ramipril (ALTACE) 2.5 mg capsule Take 1 capsule (2.5 mg total) by mouth daily    [DISCONTINUED] gabapentin (NEURONTIN) 800 mg tablet Take 1 tablet (800 mg total) by mouth 3 (three) times a day    famotidine (PEPCID) 20 mg tablet Take 1 tablet (20 mg total) by mouth 2 (two) times a day    metFORMIN (GLUCOPHAGE-XR) 500 mg 24 hr tablet Take 1 tablet (500 mg total) by mouth 2 (two) times a day with meals (Patient not taking: Reported on 2/27/2025)     No current facility-administered medications on file prior to visit.       No Known Allergies    Physical Exam:    There were no vitals taken for this visit.    Constitutional:normal, well developed, well nourished, alert, in no distress and non-toxic and no overt pain behavior.  Eyes:anicteric  HEENT:grossly intact  Neck:supple, symmetric, trachea midline and no masses   Pulmonary:even and unlabored  Cardiovascular:No edema or pitting edema present  Skin:Normal without rashes or lesions and well hydrated  Psychiatric:Mood and affect appropriate  Neurologic:Cranial Nerves II-XII grossly intact  Musculoskeletal:normal    Imaging

## 2025-03-14 ENCOUNTER — NURSE TRIAGE (OUTPATIENT)
Age: 50
End: 2025-03-14

## 2025-03-14 ENCOUNTER — OFFICE VISIT (OUTPATIENT)
Dept: FAMILY MEDICINE CLINIC | Facility: CLINIC | Age: 50
End: 2025-03-14
Payer: COMMERCIAL

## 2025-03-14 VITALS
TEMPERATURE: 97.8 F | DIASTOLIC BLOOD PRESSURE: 80 MMHG | HEIGHT: 62 IN | RESPIRATION RATE: 16 BRPM | SYSTOLIC BLOOD PRESSURE: 118 MMHG | WEIGHT: 180.8 LBS | HEART RATE: 90 BPM | BODY MASS INDEX: 33.27 KG/M2

## 2025-03-14 DIAGNOSIS — H53.8 BLURRY VISION: Primary | ICD-10-CM

## 2025-03-14 DIAGNOSIS — E13.9 LADA (LATENT AUTOIMMUNE DIABETES IN ADULTS), MANAGED AS TYPE 1 (HCC): Primary | ICD-10-CM

## 2025-03-14 DIAGNOSIS — E16.2 HYPOGLYCEMIA: ICD-10-CM

## 2025-03-14 DIAGNOSIS — E13.9 LADA (LATENT AUTOIMMUNE DIABETES IN ADULTS), MANAGED AS TYPE 1 (HCC): ICD-10-CM

## 2025-03-14 PROCEDURE — 99214 OFFICE O/P EST MOD 30 MIN: CPT | Performed by: FAMILY MEDICINE

## 2025-03-14 RX ORDER — HYDROCHLOROTHIAZIDE 12.5 MG/1
CAPSULE ORAL
Qty: 6 EACH | Refills: 1 | Status: SHIPPED | OUTPATIENT
Start: 2025-03-14

## 2025-03-14 RX ORDER — KETOROLAC TROMETHAMINE 30 MG/ML
1 INJECTION, SOLUTION INTRAMUSCULAR; INTRAVENOUS CONTINUOUS
Qty: 1 EACH | Refills: 0 | Status: SHIPPED | OUTPATIENT
Start: 2025-03-14

## 2025-03-14 NOTE — LETTER
March 14, 2025     Patient: Krysten Sanz  YOB: 1975  Date of Visit: 3/14/2025      To Whom it May Concern:    Krysten Sanz is under my professional care. Krysten was seen in my office on 3/14/2025. Krysten may return to work on 3/18/25 .    If you have any questions or concerns, please don't hesitate to call.         Sincerely,          Mendy Patricio MD

## 2025-03-14 NOTE — PROGRESS NOTES
"Name: Krysten Sanz      : 1975      MRN: [id number]  Encounter Provider: Mendy Patricio MD  Encounter Date: 3/14/2025   Encounter department: [facility]  :  Assessment & Plan  Blurry vision         Hypoglycemia         DIO (latent autoimmune diabetes in adults), managed as type 1 (HCC)    Lab Results   Component Value Date    HGBA1C 9.9 (A) 2025              Her blurry vision is likely 2/2 episodes of hypoglycemia that she has had since restarting her short and long acting insulin. I spoke with her endocrinologist Dr. Hu who suggested decreasing doses of both short and long acting insulin by 20%. Pt notified to do this.   She has appointment scheduled with ophthalmologist in 2 days.   Aware to go to the ER if symptoms worsen over the weekend.   She does use a CGM to monitor her sugars. Sugar in office today is 188 - she ate oatmeal before coming in.        History of Present Illness   HPI  She noticed blurry vision over the past 4-5 days.   She is a type 1 diabetic and had not been compliant with her insulin regimen. Her sugars were consistently in the 300s over the past few months, but then she restarted her insulin recently and sugars starting coming down. She has been getting hypoglycemic. Feels dizzy and vision has been blurry.     Review of Systems   Constitutional: Negative.    HENT: Negative.     Eyes:  Positive for visual disturbance.   Respiratory: Negative.     Cardiovascular: Negative.    Gastrointestinal: Negative.    Endocrine: Negative.    Genitourinary: Negative.    Musculoskeletal: Negative.    Skin: Negative.    Allergic/Immunologic: Negative.    Neurological: Negative.    Hematological: Negative.    Psychiatric/Behavioral: Negative.         Objective   /80   Pulse 90   Temp 97.8 °F (36.6 °C)   Resp 16   Ht 5' 2\" (1.575 m)   Wt 82 kg (180 lb 12.8 oz)   BMI 33.07 kg/m²      Physical Exam  Constitutional:       General: She is not in acute " distress.     Appearance: She is well-developed. She is not diaphoretic.   HENT:      Head: Normocephalic and atraumatic.   Eyes:      General: No scleral icterus.        Right eye: No discharge.         Left eye: No discharge.      Extraocular Movements: Extraocular movements intact.      Conjunctiva/sclera: Conjunctivae normal.      Pupils: Pupils are equal, round, and reactive to light.   Cardiovascular:      Rate and Rhythm: Normal rate and regular rhythm.      Heart sounds: Normal heart sounds. No murmur heard.     No friction rub. No gallop.   Pulmonary:      Effort: Pulmonary effort is normal. No respiratory distress.      Breath sounds: Normal breath sounds. No wheezing or rales.   Chest:      Chest wall: No tenderness.   Musculoskeletal:         General: No deformity. Normal range of motion.      Cervical back: Normal range of motion and neck supple.   Skin:     General: Skin is warm and dry.   Neurological:      Mental Status: She is alert and oriented to person, place, and time.   Psychiatric:         Behavior: Behavior normal.         Thought Content: Thought content normal.         Judgment: Judgment normal.

## 2025-03-14 NOTE — TELEPHONE ENCOUNTER
"FOLLOW UP: Same day OV scheduled with Dr. Patricio at 1230. Sees ophthalmologist 3/17/25, they instructed patient to reach out to PCP.    REASON FOR CONVERSATION: Blurred Vision    SYMPTOMS: for past several days patient has been experiencing blurred vision. Reports its not as bad in the morning but as day goes on it gradually worsens.  Patient is a type 1 diabetic and admits to not properly managing her sugars. Has been trying to be better. Has Dexcom. Has experienced blurred vision related to higher sugars in the past. CS this morning 88/ Patient sees opthalmology 3/17/25 but they told her to reach out to PCP. Unable to see endocrinology until 5/6/25.    OTHER: Ophthalmology appointment 3/17/25  Endocrinology 5/6/25    DISPOSITION: See Within 2 Weeks in Office      Reason for Disposition   Blurred vision or visual changes and gradual onset (e.g., weeks, months)    Answer Assessment - Initial Assessment Questions  1. DESCRIPTION: \"How has your vision changed?\" (e.g., complete vision loss, blurred vision, double vision, floaters, etc.)      Blurred vision   2. LOCATION: \"One or both eyes?\" If one, ask: \"Which eye?\"      Bilateral   3. SEVERITY: \"Can you see anything?\" If Yes, ask: \"What can you see?\" (e.g., fine print)      Up close its blurry, further away   4. ONSET: \"When did this begin?\" \"Did it start suddenly or has this been gradual?\"      3/10/25  5. PATTERN: \"Does this come and go, or has it been constant since it started?\"      Constant, not as bad in the morning   6. PAIN: \"Is there any pain in your eye(s)?\"  (Scale 1-10; or mild, moderate, severe)      Headaches   7. CONTACTS-GLASSES: \"Do you wear contacts or glasses?\"      Denies   8. CAUSE: \"What do you think is causing this visual problem?\"      Was not compliant with her diabetes, sugars were running higher   9. OTHER SYMPTOMS: \"Do you have any other symptoms?\" (e.g., confusion, headache, arm or leg weakness, speech problems)      Headaches   10. " "PREGNANCY: \"Is there any chance you are pregnant?\" \"When was your last menstrual period?\"        NA    Protocols used: Vision Loss or Change-Adult-[place]    "

## 2025-03-17 ENCOUNTER — TELEPHONE (OUTPATIENT)
Age: 50
End: 2025-03-17

## 2025-03-17 LAB
LEFT EYE DIABETIC RETINOPATHY: NORMAL
RIGHT EYE DIABETIC RETINOPATHY: NORMAL

## 2025-03-17 NOTE — TELEPHONE ENCOUNTER
PA for (FreeStyle Melvin 3 [place]) SUBMITTED to [facility]    via      [x][employer]-Case ID # [id number]     [x]PA sent as URGENT    All office notes, labs and other pertaining documents and studies sent. Clinical questions answered. Awaiting determination from insurance company.     Turnaround time for your insurance to make a decision on your Prior Authorization can take 7-21 business days.

## 2025-03-17 NOTE — TELEPHONE ENCOUNTER
PA for FreeStyle Melvin 3 Plus Sensor)SUBMITTED to [facility]    via    [x][employer]-Case ID # [id number]       [x]PA sent as URGENT    All office notes, labs and other pertaining documents and studies sent. Clinical questions answered. Awaiting determination from insurance company.     Turnaround time for your insurance to make a decision on your Prior Authorization can take 7-21 business days.

## 2025-03-25 DIAGNOSIS — E13.9 LADA (LATENT AUTOIMMUNE DIABETES IN ADULTS), MANAGED AS TYPE 1 (HCC): ICD-10-CM

## 2025-03-25 DIAGNOSIS — E10.65 TYPE 1 DIABETES MELLITUS WITH HYPERGLYCEMIA (HCC): Primary | ICD-10-CM

## 2025-03-25 RX ORDER — KETOROLAC TROMETHAMINE 30 MG/ML
1 INJECTION, SOLUTION INTRAMUSCULAR; INTRAVENOUS CONTINUOUS
Qty: 1 EACH | Refills: 0 | Status: SHIPPED | OUTPATIENT
Start: 2025-03-25

## 2025-03-25 RX ORDER — HYDROCHLOROTHIAZIDE 12.5 MG/1
CAPSULE ORAL
Qty: 6 EACH | Refills: 1 | Status: SHIPPED | OUTPATIENT
Start: 2025-03-25

## 2025-03-25 NOTE — TELEPHONE ENCOUNTER
PA for FreeStyle Melvin 3 [place]  DENIED    Reason:(Screenshot if applicable)        Message sent to office clinical pool Yes    Denial letter scanned into Media Yes    Appeal started No (Provider will need to decide if appeal is warranted and send clinical documentation to Prior Authorization Team for initiation.)    **Please follow up with your patient regarding denial and next steps**

## 2025-03-27 ENCOUNTER — CONSULT (OUTPATIENT)
Dept: DIABETES SERVICES | Facility: CLINIC | Age: 50
End: 2025-03-27

## 2025-03-27 VITALS — WEIGHT: 175 LBS | BODY MASS INDEX: 32.01 KG/M2

## 2025-03-27 DIAGNOSIS — E13.9 LADA (LATENT AUTOIMMUNE DIABETES IN ADULTS), MANAGED AS TYPE 1 (HCC): Primary | ICD-10-CM

## 2025-03-27 NOTE — TELEPHONE ENCOUNTER
PA for FreeStyle Melvin 3 [place]  EXCLUDED from plan       Reason:(Screenshot if applicable)        Message sent to office clinical pool Yes

## 2025-03-27 NOTE — PROGRESS NOTES
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  Krysten's most current A1C = 9.9 on 2/25/25, however, Krysten stated she was going through a 3 week difficulty adjusting to her most recent diagnosis of DIO, as previously she was being treated as Type 2 diabetes for about 12 years. Krysten stated these are her concerns: 1) Krysten stated she does not want a pump, she wants to follow a meal plan and help get her blood sugars under better control; 2) she has been getting low blood sugars at least 1x/day, usually around 4 AM, and does not want low blood sugars; 3) has a problem reading as her vision is blurry, however, her vision has improved for distance ( as she can now drive), waiting for her vision to improve for reading; 4) she does not want to gain weight, and have to buy new clothes, her goal was to maintain around 150 lbs; 5) she stated she is always thirsty, and is looking forward to this subsiding, 6) Krysten also stated she takes care of her 97 year old grandmother who lives in her own apartment, close to Krysten's home. Krysten does her grandmother's grocery shopping, etc.  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  Obtained a 24 hour food recall. Problems identified in food recall include inconsistent carbohydrate intake ( at times no carbohydrate/meal), unbalanced meals, and going too long without eating.  Provided patient with a 1500 calorie balanced individualized meal plan to assist with consistency, balance and portion control.  Encouraged the consumption of 3 balanced meals and 2 balanced snacks at appropriate times.  Advised patient to keep carbohydrate intake to 30 grams per  "meal and 15 grams per snack to assist with glycemic control.  Suggested keeping protein intake balanced with carbohydrate intake and having heart healthy fat  to assist with lipid management and calorie control. My Plate, food models, portion booklet and food labels were used to teach basic carbohydrate counting. Patient stated she will call if she desires additional Medical Nutrition Therapy education. RD will remain available for further dietary questions/concerns.     Please note: Patient had a Low Blood (49 on her Dexcom)at the beginning of her appointment with me today. I gave patient 4 oz of apple juice and in 15 minutes her blood sugar reading went to 72, in normal range. Then I gave Krysten a pack of peanut butter crackers to eat during our appointment and a bottle of water. At the completion of our appointment today, Krysten stated she was going to get additional food to eat prior to going to work today.    Ht Readings from Last 1 Encounters:   03/14/25 5' 2\" (1.575 m)     Wt Readings from Last 3 Encounters:   03/27/25 79.4 kg (175 lb)   03/14/25 82 kg (180 lb 12.8 oz)   02/25/25 78.5 kg (173 lb)     Weight Change: Yes lost 5 lbs since last chart encounter.    Barriers to Learning: no barriers    Do you follow any special diet presently?: Yes - trying to eat healthier, decreased carbohydrates, sometimes (no carbohydrates).  Who shops: patient  Who cooks: patient    Food Log: Completed via the method of food recall    Breakfast:8:30AM - 1 packet Latter-day Apple Cinnamon Oatmeal made with water  Morning Snack:None  Lunch:Noon - salami, cheese + 4 small crackers Charcuterie,diet coke or unsweetened ice tea or 1 Slim Santhosh  Afternoon Snack: None  Dinner:6:30PM - grilled chicken, double serving of fresh green beans, salad, diet ice tea  Evening Snack:10:30PM - 6 butter crackers with Slovak Cheddar cheese  Beverages: water, diet soda, diet ice tea  Eating out/Take out:1-2x/week  Exercise does yoga 20 minutes/day, " plays with dog on 1 acre of land    Calorie needs 1500kcals/day Carbs: 30g/meal, 15g/snack     Fat: heart healthy   Protein:balance with carbohydrate    Nutrition Diagnosis:  Food and nutrition related knowledge deficit  related to Lack of prior exposure to accurate nutrition related information as evidenced by Verbalizes inaccurate or incomplete information    Intervention: plate method, label reading, carbohydrate counting, increased protein intake, meal timing, weight/ BMI goals, meal planning, individualized meal plan, monitoring portion control, and exercise guidelines     Treatment Goals: Patient will consume 3 meals a day, Patient will consume snacks, and Patient will count carbohydrates    Monitoring and evaluation:    Term code indicator  FH 1.3.2 Food Intake Criteria: Consume 3 balanced meals/day at appropriate times.  Term code indicator  FH 1.6.3 Carbohydrate Intake Criteria: 30 grams carbohydrate/meal and 15 grams carbohydrate/snack.  Term code indicator  FH 1.3.2 Food Intake Criteria: Consume 2 balanced snacks/day at appropriate times.    Materials Provided: CHO counting, individualized meal plan, portion booklet    Patient’s Response to Instruction:  Comprehensiongood  Motivationgood  Expected Compliancegood    Begin Time: 8:45 AM  End Time: 10:00 AM  Referring Provider: Mckenzie Hu MD    Thank you for coming to the [facility] for education today.  Please feel free to call with any questions or concerns.    Gail Sharp, RD  [address] [address] [phone]

## 2025-04-01 ENCOUNTER — RA CDI HCC (OUTPATIENT)
Dept: OTHER | Facility: HOSPITAL | Age: 50
End: 2025-04-01

## 2025-04-01 DIAGNOSIS — E10.65 TYPE 1 DIABETES MELLITUS WITH HYPERGLYCEMIA (HCC): Primary | ICD-10-CM

## 2025-04-01 RX ORDER — ACYCLOVIR 400 MG/1
1 TABLET ORAL
Qty: 3 EACH | Refills: 5 | Status: SHIPPED | OUTPATIENT
Start: 2025-04-01

## 2025-04-01 RX ORDER — ACYCLOVIR 400 MG/1
1 TABLET ORAL CONTINUOUS
Qty: 1 EACH | Refills: 0 | Status: SHIPPED | OUTPATIENT
Start: 2025-04-01

## 2025-04-01 NOTE — PROGRESS NOTES
HCC coding opportunities          Chart Reviewed number of suggestions sent to Provider: 1     Patients Insurance        Commercial Insurance: [employer]     E11.65

## 2025-04-08 ENCOUNTER — OFFICE VISIT (OUTPATIENT)
Dept: FAMILY MEDICINE CLINIC | Facility: CLINIC | Age: 50
End: 2025-04-08
Payer: COMMERCIAL

## 2025-04-08 VITALS
WEIGHT: 177 LBS | BODY MASS INDEX: 32.57 KG/M2 | RESPIRATION RATE: 16 BRPM | TEMPERATURE: 97 F | HEIGHT: 62 IN | SYSTOLIC BLOOD PRESSURE: 124 MMHG | DIASTOLIC BLOOD PRESSURE: 80 MMHG | HEART RATE: 84 BPM

## 2025-04-08 DIAGNOSIS — E11.42 DIABETIC POLYNEUROPATHY ASSOCIATED WITH TYPE 2 DIABETES MELLITUS (HCC): ICD-10-CM

## 2025-04-08 DIAGNOSIS — E10.65 TYPE 1 DIABETES MELLITUS WITH HYPERGLYCEMIA (HCC): ICD-10-CM

## 2025-04-08 DIAGNOSIS — F41.9 ANXIETY AND DEPRESSION: ICD-10-CM

## 2025-04-08 DIAGNOSIS — R92.30 DENSE BREAST: ICD-10-CM

## 2025-04-08 DIAGNOSIS — Z12.31 SCREENING MAMMOGRAM, ENCOUNTER FOR: ICD-10-CM

## 2025-04-08 DIAGNOSIS — F32.A ANXIETY AND DEPRESSION: ICD-10-CM

## 2025-04-08 DIAGNOSIS — I10 PRIMARY HYPERTENSION: ICD-10-CM

## 2025-04-08 DIAGNOSIS — Z12.31 SCREENING MAMMOGRAM FOR BREAST CANCER: Primary | ICD-10-CM

## 2025-04-08 PROCEDURE — 99214 OFFICE O/P EST MOD 30 MIN: CPT | Performed by: NURSE PRACTITIONER

## 2025-04-08 RX ORDER — RAMIPRIL 2.5 MG/1
2.5 CAPSULE ORAL DAILY
Qty: 90 CAPSULE | Refills: 1 | Status: SHIPPED | OUTPATIENT
Start: 2025-04-08

## 2025-04-08 NOTE — ASSESSMENT & PLAN NOTE
Stable  Lab Results   Component Value Date    HGBA1C 9.9 (A) 02/25/2025       Orders:  •  ramipril (ALTACE) 2.5 mg capsule; Take 1 capsule (2.5 mg total) by mouth daily

## 2025-04-08 NOTE — ASSESSMENT & PLAN NOTE
Compliant with medications, she has follow-up with Endo, reminded to have labs drawn prior to follow-up visit  Lab Results   Component Value Date    HGBA1C 9.9 (A) 02/25/2025

## 2025-04-09 ENCOUNTER — TELEPHONE (OUTPATIENT)
Dept: ADMINISTRATIVE | Facility: OTHER | Age: 50
End: 2025-04-09

## 2025-04-09 NOTE — TELEPHONE ENCOUNTER
Upon review of the In Basket request and the patient's chart, initial outreach has been made via fax to facility. Please see Contacts section for details.     Thank you  Valerie Colmenares MA

## 2025-04-09 NOTE — LETTER
Diabetic Eye Exam Form    Date Requested: 25  Patient: Krysten Sanz  Patient : 1975   Referring Provider: ASA Ruiz      DIABETIC Eye Exam Date _______________________________      Type of Exam MUST be documented for Diabetic Eye Exams. Please CHECK ONE.     Retinal Exam       Dilated Retinal Exam       OCT       Optomap-Iris Exam      Fundus Photography       Left Eye - Please check Retinopathy or No Retinopathy        Exam did show retinopathy    Exam did not show retinopathy       Right Eye - Please check Retinopathy or No Retinopathy       Exam did show retinopathy    Exam did not show retinopathy       Comments ____2025 or most recent  ______________________________________________________    Practice Providing Exam ______________________________________________    Exam Performed By (print name) _______________________________________      Provider Signature ___________________________________________________      These reports are needed for  compliance.    Please fax this completed form and a copy of the Diabetic Eye Exam report to the [facility] Based Department as soon as possible via Fax [phone], attention Valerie: Phone [phone]. Our office is located at [address] [place] [address].     We thank you for your assistance in treating our mutual patient.

## 2025-04-09 NOTE — TELEPHONE ENCOUNTER
----- Message from Enoc FISHMAN sent at 4/8/2025  8:51 AM EDT -----  Regarding: care gap request  04/08/25 8:51 AM    Hello, our patient attached above has had Diabetic Eye Exam completed/performed. Please assist in updating the patient chart by making an External outreach to [facility] facility located in [facility]. The date of service is 3/17/25.    Thank you,  ENOC LACEY  [facility]

## 2025-04-09 NOTE — TELEPHONE ENCOUNTER
Upon review of the In Basket request we were able to locate, review, and update the patient chart as requested for Diabetic Eye Exam.    Any additional questions or concerns should be emailed to the Practice Liaisons via the appropriate education email address, please do not reply via In Basket.    Thank you  Valerie Colmenares MA   [facility]

## 2025-05-06 ENCOUNTER — OFFICE VISIT (OUTPATIENT)
Dept: ENDOCRINOLOGY | Facility: CLINIC | Age: 50
End: 2025-05-06
Payer: COMMERCIAL

## 2025-05-06 VITALS
BODY MASS INDEX: 32.02 KG/M2 | RESPIRATION RATE: 14 BRPM | WEIGHT: 174 LBS | HEART RATE: 71 BPM | DIASTOLIC BLOOD PRESSURE: 84 MMHG | SYSTOLIC BLOOD PRESSURE: 132 MMHG | OXYGEN SATURATION: 94 % | TEMPERATURE: 97.6 F | HEIGHT: 62 IN

## 2025-05-06 DIAGNOSIS — E10.65 TYPE 1 DIABETES MELLITUS WITH HYPERGLYCEMIA (HCC): ICD-10-CM

## 2025-05-06 DIAGNOSIS — E13.9 LADA (LATENT AUTOIMMUNE DIABETES IN ADULTS), MANAGED AS TYPE 1 (HCC): ICD-10-CM

## 2025-05-06 DIAGNOSIS — E55.9 VITAMIN D DEFICIENCY: ICD-10-CM

## 2025-05-06 DIAGNOSIS — E10.42 DIABETIC POLYNEUROPATHY ASSOCIATED WITH TYPE 1 DIABETES MELLITUS (HCC): ICD-10-CM

## 2025-05-06 DIAGNOSIS — E13.9 TYPE 1.5 DIABETES, MANAGED AS TYPE 1 (HCC): Primary | ICD-10-CM

## 2025-05-06 PROCEDURE — 99215 OFFICE O/P EST HI 40 MIN: CPT | Performed by: INTERNAL MEDICINE

## 2025-05-06 PROCEDURE — 95251 CONT GLUC MNTR ANALYSIS I&R: CPT | Performed by: INTERNAL MEDICINE

## 2025-05-06 RX ORDER — ACYCLOVIR 400 MG/1
1 TABLET ORAL
Qty: 3 EACH | Refills: 5 | Status: SHIPPED | OUTPATIENT
Start: 2025-05-06

## 2025-05-06 RX ORDER — INSULIN GLARGINE 300 U/ML
16 INJECTION, SOLUTION SUBCUTANEOUS DAILY
Qty: 6 ML | Refills: 1 | Status: SHIPPED | OUTPATIENT
Start: 2025-05-06 | End: 2025-05-06

## 2025-05-06 RX ORDER — INSULIN LISPRO-AABC 100 [IU]/ML
INJECTION, SOLUTION SUBCUTANEOUS
Qty: 18 ML | Refills: 1 | Status: SHIPPED | OUTPATIENT
Start: 2025-05-06 | End: 2025-05-06

## 2025-05-06 RX ORDER — INSULIN LISPRO-AABC 100 [IU]/ML
INJECTION, SOLUTION SUBCUTANEOUS
Qty: 18 ML | Refills: 1 | Status: SHIPPED | OUTPATIENT
Start: 2025-05-06

## 2025-05-06 RX ORDER — TIRZEPATIDE 2.5 MG/.5ML
2.5 INJECTION, SOLUTION SUBCUTANEOUS WEEKLY
Qty: 2 ML | Refills: 0 | Status: SHIPPED | OUTPATIENT
Start: 2025-05-06

## 2025-05-06 RX ORDER — INSULIN GLARGINE 300 U/ML
9 INJECTION, SOLUTION SUBCUTANEOUS DAILY
Qty: 6 ML | Refills: 1 | Status: SHIPPED | OUTPATIENT
Start: 2025-05-06

## 2025-05-06 NOTE — PROGRESS NOTES
"    Follow-up Patient Progress Note      CC: type 1 diabetes/DIO     History of Present Illness:   47 yr female with type 1 diabetes for 12 yrs, hx of GDM in 2005, gastroparesis, neuropathy, vitamin D deficiency, asthma.  Last visit was 10/4/2024.     Since last visit, she gained 7 lbs.     CGM data review::  Device: dexcom          Dates:  4/20/25-5/3/25             Usage: 79 %   Av glu: 138mg/dL                   SD: 61 mg/dL        CV:   %            GMI: 6.6  %  TIR: 70 %                    TAR: 16+3 %             TBR: 6+5  %     Glycemic patters: significant fasting hypoglycemia and mild pp hyperglycemia.      Current meds:  Humalog 4u 4u 4u     Regular, Apidra, Humalog orNovolog Sliding Scale:   <80                      151-200 +1  +1 +1     201-250 +2 +2 +2 +   251-300 +3 +3 +3 +   301-350 +4 +4 +4 +   >350 +5 +5 +5 +      Metformin 500mg po bid           Toujeo        16u         Opthamology: yes, 1/11/23  Podiatry:   vaccination:   Dental:  Pancreatitis: yes     Ace/ARB: ramipril  Statin: no  Thyroid issues: no      Physical Exam:  Body mass index is 31.83 kg/m².  /84 (BP Location: Left arm, Patient Position: Sitting)   Pulse 71   Temp 97.6 °F (36.4 °C) (Tympanic)   Resp 14   Ht 5' 2\" (1.575 m)   Wt 78.9 kg (174 lb)   LMP 03/29/2025 (Exact Date)   SpO2 94%   BMI 31.83 kg/m²    Vitals:    05/06/25 1103   Weight: 78.9 kg (174 lb)        Physical Exam  Constitutional:       General: She is not in acute distress.     Appearance: She is well-developed.   HENT:      Head: Normocephalic and atraumatic.      Nose: Nose normal.   Eyes:      Conjunctiva/sclera: Conjunctivae normal.   Pulmonary:      Effort: Pulmonary effort is normal.   Abdominal:      General: There is no distension.   Musculoskeletal:      Cervical back: Normal range of motion and neck supple.   Skin:     Findings: No rash.      Comments: No icterus   Neurological:      Mental Status: She is alert and oriented to person, place, " and time.         Labs:   Lab Results   Component Value Date    HGBA1C 9.9 (A) 02/25/2025       Lab Results   Component Value Date    [id number] 0.79 06/07/2017    TSH 1.640 09/09/2024       Lab Results   Component Value Date    CREATININE 0.71 06/15/2023    CREATININE 0.89 10/22/2022    CREATININE 0.94 10/21/2022    BUN 8 06/15/2023     06/07/2017    K 4.4 06/15/2023    CL 96 06/15/2023    CO2 22 06/15/2023     eGFR   Date Value Ref Range Status   06/15/2023 105 >59 mL/min/1.73 Final   10/22/2022 77 ml/min/1.73sq m Final       Lab Results   Component Value Date    ALT 9 06/15/2023    AST 12 06/15/2023    ALKPHOS 77 10/22/2022    BILITOT 0.6 06/07/2017       Lab Results   Component Value Date    CHOLESTEROL 140 01/24/2018     Lab Results   Component Value Date    HDL 55 01/24/2018    HDL 56 02/17/2017     Lab Results   Component Value Date    TRIG 301 (H) 10/22/2022    TRIG 52 01/24/2018    TRIG 72 02/17/2017     Lab Results   Component Value Date    NONHDLC 107 02/17/2017         Assessment/Plan:    1. Type 1.5 diabetes, managed as type 1 (HCC)  Assessment & Plan:  She is uncontrolled with DIO/Type 1 diabetes based on positive antibodies.  She wishes to avoid insulin pump.    She gained significant weight - we agreed to start a GLP 1 agonist.  I reiterated advise about diet and lifestyle changes, punp, sensor and flexible insulin therapy.    Follow up in 3 months.      Humalog 1u/10gm carb       Regular, Apidra, Humalog orNovolog Sliding Scale:   <80                      151-200 +1  +1 +1     201-250 +2 +2 +2 +   251-300 +3 +3 +3 +   301-350 +4 +4 +4 +   >350 +5 +5 +5 +      Mounjaro 2.5mg weekly           Toujeo        9u       Lab Results   Component Value Date    HGBA1C 9.9 (A) 02/25/2025       Lab Results   Component Value Date    HGBA1C 9.9 (A) 02/25/2025     Orders:  -     Hemoglobin A1C; Future  -     Mounjaro 2.5 MG/0.5ML SOAJ; Inject 2.5 mg under the skin once a week  -     Hemoglobin  A1C  2. Type 1 diabetes mellitus with hyperglycemia (HCC)  -     Hemoglobin A1C; Future  -     Mounjaro 2.5 MG/0.5ML SOAJ; Inject 2.5 mg under the skin once a week  -     Continuous Glucose Sensor (Dexcom G7 Sensor); Use 1 Device every 10 days  -     Hemoglobin A1C  3. Vitamin D deficiency  4. Diabetic polyneuropathy associated with type 1 diabetes mellitus (HCC)  5. DIO (latent autoimmune diabetes in adults), managed as type 1 (HCC)  -     insulin glargine (Toujeo Max SoloStar) 300 units/mL CONCENTRATED U-300 injection pen (2-unit dial); Inject 9 Units under the skin daily  -     Insulin Lispro-aabc, 1 U Dial, (Lyumjev KwikPen) 100 UNIT/ML SOPN; Use 1u/10gm ICR TIDAC plus 1u/50 above 150mg/dL; max 25u daily        I have spent a total time of 40 minutes on 05/06/25 in caring for this patient including greater than 50% of this time was spent in counseling/coordination of care as listed above.       Discussed with the patient and all questioned fully answered. She will contact me with concerns.    Mckenzie Hu

## 2025-05-06 NOTE — ASSESSMENT & PLAN NOTE
She is uncontrolled with DIO/Type 1 diabetes based on positive antibodies.  She wishes to avoid insulin pump.    She gained significant weight - we agreed to start a GLP 1 agonist.  I reiterated advise about diet and lifestyle changes, punp, sensor and flexible insulin therapy.    Follow up in 3 months.      Humalog 1u/10gm carb       Regular, Apidra, Humalog orNovolog Sliding Scale:   <80                      151-200 +1  +1 +1     201-250 +2 +2 +2 +   251-300 +3 +3 +3 +   301-350 +4 +4 +4 +   >350 +5 +5 +5 +      Mounjaro 2.5mg weekly           Toujeo        9u       Lab Results   Component Value Date    HGBA1C 9.9 (A) 02/25/2025       Lab Results   Component Value Date    HGBA1C 9.9 (A) 02/25/2025

## 2025-05-06 NOTE — ADDENDUM NOTE
Addended by: LATOYA STAFFORD on: 5/6/2025 11:36 AM     Modules accepted: Orders, Level of Service

## 2025-05-08 ENCOUNTER — TELEPHONE (OUTPATIENT)
Age: 50
End: 2025-05-08

## 2025-05-08 NOTE — TELEPHONE ENCOUNTER
PA for MOUNJARO 2.5 SUBMITTED to [employer]    via      [x][employer]-Case ID # [id number]    [x]PA sent as URGENT    All office notes, labs and other pertaining documents and studies sent. Clinical questions answered. Awaiting determination from insurance company.     Turnaround time for your insurance to make a decision on your Prior Authorization can take 7-21 business days.

## 2025-05-09 NOTE — TELEPHONE ENCOUNTER
PA for Mounjaro 2.5mg NOT REQUIRED     Reason              Pharmacy advised by    [x]Fax  []Phone call

## 2025-06-03 DIAGNOSIS — E10.65 TYPE 1 DIABETES MELLITUS WITH HYPERGLYCEMIA (HCC): Primary | ICD-10-CM

## 2025-06-03 DIAGNOSIS — E13.9 TYPE 1.5 DIABETES, MANAGED AS TYPE 1 (HCC): ICD-10-CM

## 2025-06-05 RX ORDER — METFORMIN HYDROCHLORIDE 500 MG/1
500 TABLET, EXTENDED RELEASE ORAL 2 TIMES DAILY WITH MEALS
Qty: 180 TABLET | Refills: 1 | Status: SHIPPED | OUTPATIENT
Start: 2025-06-05

## 2025-06-28 DIAGNOSIS — E10.65 TYPE 1 DIABETES MELLITUS WITH HYPERGLYCEMIA (HCC): ICD-10-CM

## 2025-06-28 DIAGNOSIS — E13.9 TYPE 1.5 DIABETES, MANAGED AS TYPE 1 (HCC): ICD-10-CM

## 2025-06-30 RX ORDER — TIRZEPATIDE 2.5 MG/.5ML
INJECTION, SOLUTION SUBCUTANEOUS
Qty: 2 ML | Refills: 3 | Status: SHIPPED | OUTPATIENT
Start: 2025-06-30

## 2025-08-08 ENCOUNTER — OFFICE VISIT (OUTPATIENT)
Dept: FAMILY MEDICINE CLINIC | Facility: CLINIC | Age: 50
End: 2025-08-08
Payer: COMMERCIAL

## 2025-08-08 VITALS
BODY MASS INDEX: 31.39 KG/M2 | WEIGHT: 170.6 LBS | HEIGHT: 62 IN | HEART RATE: 64 BPM | SYSTOLIC BLOOD PRESSURE: 124 MMHG | DIASTOLIC BLOOD PRESSURE: 76 MMHG | RESPIRATION RATE: 18 BRPM | TEMPERATURE: 96.1 F

## 2025-08-08 DIAGNOSIS — E11.43 GASTROPARESIS DUE TO DM  (HCC): ICD-10-CM

## 2025-08-08 DIAGNOSIS — F41.9 ANXIETY AND DEPRESSION: ICD-10-CM

## 2025-08-08 DIAGNOSIS — E11.42 DIABETIC POLYNEUROPATHY ASSOCIATED WITH TYPE 2 DIABETES MELLITUS (HCC): ICD-10-CM

## 2025-08-08 DIAGNOSIS — K31.84 GASTROPARESIS DUE TO DM  (HCC): ICD-10-CM

## 2025-08-08 DIAGNOSIS — E10.65 TYPE 1 DIABETES MELLITUS WITH HYPERGLYCEMIA (HCC): Primary | ICD-10-CM

## 2025-08-08 DIAGNOSIS — I10 PRIMARY HYPERTENSION: ICD-10-CM

## 2025-08-08 DIAGNOSIS — F32.A ANXIETY AND DEPRESSION: ICD-10-CM

## 2025-08-08 DIAGNOSIS — Z13.6 SCREENING FOR CARDIOVASCULAR CONDITION: ICD-10-CM

## 2025-08-08 PROCEDURE — 99214 OFFICE O/P EST MOD 30 MIN: CPT | Performed by: NURSE PRACTITIONER

## 2025-08-08 PROCEDURE — 36410 VNPNXR 3YR/> PHY/QHP DX/THER: CPT | Performed by: NURSE PRACTITIONER

## 2025-08-08 RX ORDER — RAMIPRIL 2.5 MG/1
2.5 CAPSULE ORAL DAILY
Qty: 90 CAPSULE | Refills: 1 | Status: SHIPPED | OUTPATIENT
Start: 2025-08-08

## 2025-08-08 RX ORDER — ESCITALOPRAM OXALATE 20 MG/1
20 TABLET ORAL DAILY
Qty: 90 TABLET | Refills: 1 | Status: SHIPPED | OUTPATIENT
Start: 2025-08-08

## 2025-08-08 RX ORDER — ONDANSETRON 4 MG/1
4 TABLET, FILM COATED ORAL EVERY 8 HOURS PRN
Qty: 20 TABLET | Refills: 3 | Status: SHIPPED | OUTPATIENT
Start: 2025-08-08

## 2025-08-12 ENCOUNTER — TELEPHONE (OUTPATIENT)
Dept: FAMILY MEDICINE CLINIC | Facility: CLINIC | Age: 50
End: 2025-08-12

## 2025-08-12 LAB
ALBUMIN SERPL-MCNC: 3.9 G/DL (ref 3.9–4.9)
ALBUMIN/CREAT UR: 5 MG/G CREAT (ref 0–29)
ALP SERPL-CCNC: 72 IU/L (ref 44–121)
ALT SERPL-CCNC: 7 IU/L (ref 0–32)
AST SERPL-CCNC: 13 IU/L (ref 0–40)
BILIRUB SERPL-MCNC: <0.2 MG/DL (ref 0–1.2)
BUN SERPL-MCNC: 10 MG/DL (ref 6–24)
BUN/CREAT SERPL: 10 (ref 9–23)
CALCIUM SERPL-MCNC: 9 MG/DL (ref 8.7–10.2)
CHLORIDE SERPL-SCNC: 95 MMOL/L (ref 96–106)
CHOLEST SERPL-MCNC: 171 MG/DL (ref 100–199)
CO2 SERPL-SCNC: 25 MMOL/L (ref 20–29)
CREAT SERPL-MCNC: 0.98 MG/DL (ref 0.57–1)
CREAT UR-MCNC: 139.4 MG/DL
EGFR: 71 ML/MIN/1.73
EST. AVERAGE GLUCOSE BLD GHB EST-MCNC: 194 MG/DL
GLOBULIN SER-MCNC: 2.8 G/DL (ref 1.5–4.5)
GLUCOSE SERPL-MCNC: 113 MG/DL (ref 70–99)
HBA1C MFR BLD: 8.4 % (ref 4.8–5.6)
HDLC SERPL-MCNC: 60 MG/DL
LDLC SERPL CALC-MCNC: 92 MG/DL (ref 0–99)
LDLC/HDLC SERPL: 1.5 RATIO (ref 0–3.2)
MICROALBUMIN UR-MCNC: 7 UG/ML
MICRODELETION SYND BLD/T FISH: NORMAL
POTASSIUM SERPL-SCNC: 5.8 MMOL/L (ref 3.5–5.2)
PROT SERPL-MCNC: 6.7 G/DL (ref 6–8.5)
SL AMB VLDL CHOLESTEROL CALC: 19 MG/DL (ref 5–40)
SODIUM SERPL-SCNC: 139 MMOL/L (ref 134–144)
TRIGL SERPL-MCNC: 108 MG/DL (ref 0–149)